# Patient Record
Sex: MALE | Race: WHITE | NOT HISPANIC OR LATINO | Employment: OTHER | URBAN - METROPOLITAN AREA
[De-identification: names, ages, dates, MRNs, and addresses within clinical notes are randomized per-mention and may not be internally consistent; named-entity substitution may affect disease eponyms.]

---

## 2021-04-07 ENCOUNTER — OFFICE VISIT CONVERTED (OUTPATIENT)
Dept: FAMILY MEDICINE CLINIC | Facility: CLINIC | Age: 86
End: 2021-04-07
Attending: NURSE PRACTITIONER

## 2021-04-07 ENCOUNTER — HOSPITAL ENCOUNTER (OUTPATIENT)
Dept: OTHER | Facility: HOSPITAL | Age: 86
Discharge: HOME | End: 2021-04-07
Attending: INTERNAL MEDICINE

## 2021-04-07 LAB
INR PPP: 1.67 (ref 2–3)
PROTHROMBIN TIME: 17.1 S (ref 9.4–12)

## 2021-05-05 ENCOUNTER — HOSPITAL ENCOUNTER (OUTPATIENT)
Dept: GENERAL RADIOLOGY | Facility: HOSPITAL | Age: 86
Discharge: HOME | End: 2021-05-05
Attending: NURSE PRACTITIONER

## 2021-05-05 ENCOUNTER — OFFICE VISIT CONVERTED (OUTPATIENT)
Dept: PULMONOLOGY | Facility: CLINIC | Age: 86
End: 2021-05-05
Attending: NURSE PRACTITIONER

## 2021-05-07 ENCOUNTER — CONVERSION ENCOUNTER (OUTPATIENT)
Dept: FAMILY MEDICINE CLINIC | Facility: CLINIC | Age: 86
End: 2021-05-07

## 2021-05-07 ENCOUNTER — OFFICE VISIT CONVERTED (OUTPATIENT)
Dept: FAMILY MEDICINE CLINIC | Facility: CLINIC | Age: 86
End: 2021-05-07
Attending: NURSE PRACTITIONER

## 2021-05-11 ENCOUNTER — HOSPITAL ENCOUNTER (OUTPATIENT)
Dept: CT IMAGING | Facility: HOSPITAL | Age: 86
Discharge: HOME | End: 2021-05-11
Attending: NURSE PRACTITIONER

## 2021-05-11 ENCOUNTER — HOSPITAL ENCOUNTER (OUTPATIENT)
Dept: FAMILY MEDICINE CLINIC | Facility: CLINIC | Age: 86
Discharge: HOME | End: 2021-05-11
Attending: NURSE PRACTITIONER

## 2021-05-11 NOTE — H&P
History and Physical      Patient Name: Arvind Coates   Patient ID: [id number]   Sex: Male   YOB: 1935    Primary Care Provider: Paco BAKER    Visit Date: April 7, 2021    Provider: OLIVIA Adamson   Location: [facility] [place]   Location Address: [address] [place] [address], [address]  [place], [place]  [id number]   Location Phone: [phone]          Chief Complaint  · NEW PT - EST CARE      History Of Present Illness  Arvind Coates is a 85 year old male who presents for evaluation and treatment of:      Presents today to establish care and for an inpatient follow-up.  He is an 85-year-old with past medical history of coronary artery disease status post CABG, hypertension, chronic A. fib on Coumadin status post permanent pacemaker.  He went to the emergency department on 3/21/2020 for a 1 week of shortness of breath, fever, and chills.  He had received his first Covid 19 vaccine on 3/10/2020 after 1 to 2 days he began feeling myalgias fatigue and a nonproductive cough and chills.  He tested positive for Covid on 3/13.  Both his girlfriend and daughter had tested positive for Covid.  He was seen in urgent care in [place].  Upon admission he was also to be found hypoxic, super therapeutic INR of 6, ANNEL, lactic acidosis.  He was admitted to the intensive care unit and was started on dexamethasone, vancomycin, and cefepime.  His nadirs tested positive for MRSA with a concern of preimposed bacterial pneumonia.  He was discharged on 4/4/2021.  He was discharged home on 5 more days of cefdinir.  Coumadin was held at discharge.  Recommended for his INR check in 3 days.  Cardiologist Yang Jackson has been monitoring his PT/INR of levels.  His previous PCP was Jaycob Najera at [facility] 5 to 6 years ago.  He was discharged on home health for PT OT, home oxygen, and nebulizer.    He was discharged home on DuoNeb p every 4 as needed, aspirin 81 mg daily, atorvastatin 40 mg at bedtime,  "cefdinir 300 mg twice daily, metoprolol succinate 25 mg daily, and guaifenesin 600 mg twice daily       Past Medical History  Broken Bones; CHF (congestive heart failure); Hemorrhoids; Hyperlipidemia; Hypertension; Shortness of Breath         Past Surgical History  Colonscopy         Medication List  aspirin 81 mg oral tablet,chewable; atorvastatin 40 mg oral tablet; cefdinir 300 mg oral capsule; Duo Neb 3 ml inhalation; ipratropium-albuterol 0.5 mg-3 mg(2.5 mg base)/3 mL inhalation solution for nebulization; metoprolol succinate 25 mg oral tablet extended release 24 hr; Quaifenesin 600 mg oral         Allergy List  NO KNOWN DRUG ALLERGIES         Family Medical History  Colon Cancer; Prostate cancer; Diabetes         Social History  Tobacco (Never)         Immunizations  No Pertinent Immunization History         Review of Systems  · Constitutional  o Admits  o : fatigue  o Denies  o : fever, chills, body aches  · Eyes  o Denies  o : blurred vision, changes in vision  · HENT  o Denies  o : headaches, vertigo, lightheadedness  · Cardiovascular  o Denies  o : chest pain, irregular heart beats, rapid heart rate, dyspnea on exertion  · Respiratory  o Admits  o : shortness of breath, cough  o Denies  o : wheezing  · Gastrointestinal  o Denies  o : nausea, vomiting, diarrhea, constipation, abdominal pain, blood in stools, melena  · Genitourinary  o Denies  o : frequency, dysuria, hematuria  · Integument  o Denies  o : rash, new skin lesions  · Musculoskeletal  o Denies  o : joint pain, joint swelling, muscle pain  · Endocrine  o Admits  o : central obesity  o Denies  o : polyuria, polydipsia      Vitals  Date Time BP Position Site L\R Cuff Size HR RR TEMP (F) WT  HT  BMI kg/m2 BSA m2 O2 Sat FR L/min FiO2        04/07/2021 01:34 /66 Sitting    89 - R  97.8 168lbs 8oz 5'  8\" 25.62 1.91 98 %            Physical Examination  · Constitutional  o Appearance  o : alert, in no acute distress  · Head and Face  o Head  o : "   § Inspection  § : atraumatic, normocephalic  o Face  o :   § Inspection  § : no facial lesions  o HEENT  o : Unremarkable  · Eyes  o Conjunctivae  o : conjunctivae normal  o Sclerae  o : sclerae white  o Pupils and Irises  o : pupils equal and round, pupils reactive to light bilaterally  o Eyelids/Ocular Adnexae  o : eyelid appearance normal  · Ears, Nose, Mouth and Throat  o Ears  o :   § External Ears  § : appearance within normal limits, no lesions present  o Nose  o :   § External Nose  § : appearance normal  o Oral Cavity  o :   § Oral Mucosa  § : oral mucosa normal  § Lips  § : lip appearance normal  § Teeth  § : poor dentition   § Gums  § : gums pink, non-swollen, no bleeding present  § Tongue  § : tongue appearance normal  § Palate  § : hard palate normal, soft palate appearance normal  o Throat  o : tonsils+1  · Neck  o Inspection/Palpation  o : normal appearance, no masses or tenderness, trachea midline  o Thyroid  o : gland size normal, nontender, no nodules or masses present on palpation  · Respiratory  o Respiratory Effort  o : breathing unlabored  o Auscultation of Lungs  o : normal breath sounds  · Cardiovascular  o Heart  o :   § Auscultation of Heart  § : regular rate, normal rhythm, no murmurs present  o Peripheral Vascular System  o :   § Extremities  § : no edema  · Gastrointestinal  o Abdominal Examination  o : abdomen nontender to palpation, normal bowel sounds, tone normal without rigidity or guarding, no masses present  o Liver and spleen  o : no hepatomegaly present  · Lymphatic  o Neck  o : no lymphadenopathy   o Supraclavicular Nodes  o : no supraclavicular nodes              Assessment  · Annual physical exam     V70.0/Z00.00  · Cough     786.2/R05  · Essential hypertension     401.9/I10  · Pneumonia     486/J18.9  · COVID-19     079.89/U07.1  · ANNEL (acute kidney injury)     584.9/N17.9  · CAD (coronary artery disease)     414.00/I25.10  · Afib     427.31/I48.91  · Monitoring for  anticoagulant use       Encounter for therapeutic drug level monitoring     V58.61/Z51.81  Long term (current) use of anticoagulants     V58.61/Z79.01  Check PT/INR. Send results to Yang Jackson to monitor. [facility] [employer] has consulted for PT OT. He is currently on oxygen 2 L nasal cannula. He is supposed to follow-up with pulmonary for his pneumonia in 1 to 2 weeks.       Plan  · Orders  o ACO-18: Negative screen for clinical depression using a standardized tool () - - 04/07/2021   0 POINTS  o ACO-39: Current medications updated and reviewed (, 1159F) - - 04/07/2021  o PULMONARY CONSULTATION (PULMO) - 079.89/U07.1, 486/J18.9 - 04/07/2021   IPF, d/c 4/4, 2 week follow up with [facility]  · Instructions  o Reviewed health maintenance flowsheet and updated information. Orders were placed and/or patient's response was documented.  o Patient advised to monitor blood pressure (B/P) at home and journal readings. Patient informed that a B/P reading at home of more than 130/80 is considered hypertension. For readings greater [id number]/90 or higher patient is advised to follow up in the office with readings for management. Patient advised to limit sodium intake.  o Patient was educated/instructed on their diagnosis, treatment and medications prior to discharge from the clinic today.  o Patient instructed to seek medical attention urgently for new or worsening symptoms.  o Call the office with any concerns or questions.  · Disposition  o Call or Return if symptoms worsen or persist.  o f/u 1 month            Electronically Signed by: OLIVIA Adamson -Author on April 14, 2021 06:10:25 PM

## 2021-05-13 ENCOUNTER — OFFICE VISIT CONVERTED (OUTPATIENT)
Dept: PULMONOLOGY | Facility: CLINIC | Age: 86
End: 2021-05-13
Attending: SPECIALIST

## 2021-05-13 LAB
BACTERIA SPEC AEROBE CULT: ABNORMAL
CEFEPIME SUSC ISLT: 2
CEFTAZIDIME SUSC ISLT: 2
CIPROFLOXACIN SUSC ISLT: <=0.25
GENTAMICIN SUSC ISLT: <=1
LEVOFLOXACIN SUSC ISLT: 0.5
PIP+TAZO SUSC ISLT: <=4
TOBRAMYCIN SUSC ISLT: <=1

## 2021-05-14 VITALS
BODY MASS INDEX: 25.54 KG/M2 | HEIGHT: 68 IN | DIASTOLIC BLOOD PRESSURE: 66 MMHG | OXYGEN SATURATION: 98 % | SYSTOLIC BLOOD PRESSURE: 130 MMHG | HEART RATE: 89 BPM | TEMPERATURE: 97.8 F | WEIGHT: 168.5 LBS

## 2021-05-28 VITALS
WEIGHT: 182.5 LBS | SYSTOLIC BLOOD PRESSURE: 103 MMHG | HEIGHT: 68 IN | TEMPERATURE: 98 F | OXYGEN SATURATION: 83 % | BODY MASS INDEX: 27.66 KG/M2 | RESPIRATION RATE: 14 BRPM | DIASTOLIC BLOOD PRESSURE: 62 MMHG | HEART RATE: 83 BPM

## 2021-05-28 NOTE — PROGRESS NOTES
Patient: MORIS MERCADO     Acct: [id number]     Report: #[id number]  UNIT #: [id number]     : 1935    Encounter Date:2021  PRIMARY CARE: ALEXUS MAGANA  ***Signed***  --------------------------------------------------------------------------------------------------------------------  Chief Complaint      Encounter Date      May 13, 2021            Primary Care Provider      SPEEDY TIAN            Patient Complaint      Patient is complaining of      Patient is here for a 4 month f/u            VITALS      Height 5 ft 8 in / 172.72 cm      Weight 182 lbs 8 oz / 82.872477 kg      BSA 1.97 m2      BMI 27.7 kg/m2      Temperature 98.0 F / 36.67 C - Tympanic      Pulse 83      Respirations 14      Blood Pressure 103/62 Sitting, Left Arm      Pulse Oximetry 83%, room air            HPI      This is an 87-year-old very pleasant gentleman had a history of COVID-19     sometime in 2021.  He had the COVID-19 vaccination few days before and     felt that patient started having the COVID-19 even before getting vaccinated.      Patient was in the hospital for couple of weeks and the treated and the patient     is discharged home on oxygen.  Compared to that patient is feeling much better.     He still continues to cough up phlegm and short of breath on moderate exertion.     Denies any fever chills sweating or any exposed anybody who is sick around.      Denied any hemoptysis or any recent travel history.      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      Other medical problems and management, social history and review of the system     are done and as documented.            ROS      Constitutional:  Denies: Fatigue, Fever, Weight gain, Weight loss, Chills,     Insomnia, Other      Respiratory/Breathing:  Denies: Shortness of air, Wheezing, Cough, Hemoptysis,     Pleuritic pain, Other       Endocrine:  Denies: Polydipsia, Polyuria, Heat/cold intolerance, Diabetes, Other      Eyes:  Denies: Blurred vision, Vision Changes, Other      Ears, nose, mouth, throat:  Denies: Congestion, Dysphagia, Hearing Changes, Nose    Bleeding, Nasal Discharge, Throat pain, Tinnitus, Other      Cardiovascular:  Denies: Chest Pain, Exertional dyspnea, Peripheral Edema,     Palpitations, Syncope, Wake up Gasping for air, Orthopnea, Tachycardia, Other      Gastrointestinal:  Denies: Abdominal pain/cramping, Bloody stools, Constipation,    Diarrhea, Melena, Nausea, Vomiting, Other      Genitourinary:  Denies: Dysuria, Urinary frequency, Incontinence, Hematuria,     Urgency, Other      Musculoskeletal:  Denies: Joint Pain, Joint Stiffness, Joint Swelling, Myalgias,    Other      Hematologic/lymphatic:  DENIES: Lymphadenopathy, Bruising, Bleeding tendencies,     Other      Neurologic:  Denies: Headache, Numbness, Weakness, Seizures, Other      Psychiatric:  Denies: Anxiety, Appropriate Effect, Depression, Other      Sleep:  No: Excessive daytime sleep, Morning Headache?, Snoring, Insomnia?, Stop    breathing at sleep?, Other      Integumentary:  Denies: Rash, Dry skin, Skin Warm to Touch, Other            FAMILY/SOCIAL/MEDICAL HX      Surgical History:  Yes: Appendectomy, Bowel Surgery (polyp removal), CABG, Oral     Surgery (wisdom teeth removal ); No: AAA Repair, Abdominal Surgery, Adenoids,     Angioplasty, Back Surgery, Bladder Surgery, Breast Surgery, Carotid Stenosis,     Cholecystectomy, Ear Surgery, Eye Surgery, Head Surgery, Hernia Surgery, Kidney     Surgery, Nose Surgery, Orthopedic Surgery, Prostatectomy, Rectal Surgery, Spinal    Surgery, Testicular Surgery, Throat Surgery, Tonsils, Valve Replacement,     Vascular Surgery, Other Surgeries      Heart - Family Hx:  Father, Brother      Diabetes - Family Hx:  Child      Cancer/Type - Family Hx:  Brother      Is Father Still Living?:  No      Is Mother Still Living?:   No       Family History:  Yes      Social History:  No Tobacco Use, No Alcohol Use, No Recreational Drug use      Smoking status:  Never smoker      Medical History:  Yes: Arthritis, Congestive Heart Failu, Hemorrhoids/Rectal     Prob (possible ulcer,polyps), High Blood Pressure, Miscellaneous Medical/oth     (1ST CV-19 SHOT 3/10/21; COVID POSITIVE 3/13/21); No: Alcoholism, Allergies,     Anemia, Asthma, Atrial Fibrillation, Blood Disease, Broken Bones, Cataracts,     Chemical Dependency, Chemotherapy/Cancer, Chronic Bronchitis/COPD, Emphysema,     Chronic Liver Disease, Colon Trouble, Colitis, Diverticulitis, Deafness or     Ringing Ears, Convulsions, Depression, Anxiety, Bipolar Disorder, PTSD,     Diabetes, Epilepsy, Seizures, Forgetfullness, Glaucoma, Gall Stones, Gout, Head     Injury, Heart Attack, Heart Murmur, GERD, Hepatitis, Hiatal Hernia, High     Cholesterol, HIV (Do not ask - volu, Jaundice, Kidney or Bladder Disease, Kidney    Stones, Migrane Headaches, Mitral Valve Prolapse, Night sweats, Phlebitis,     Psychiatric Care, Reflux Disease, Rheumatic Fever, Sexually Transmitted Dis,     Shortness Of Breath, Sinus Trouble, Skin Disease/Psoriais/Ecz, Stroke, Thyroid     Problem, Tuberculosis or Pos TB Te      Psychiatric History      none            PREVENTION      Hx Influenza Vaccination:  Yes      Date Influenza Vaccine Given:  Nov 1, 2020      Influenza Vaccine Declined:  No      2 or More Falls in Past Year?:  No      Fall Past Year with Injury?:  No      Hx Pneumococcal Vaccination:  No      Encouraged to follow-up with:  PCP regarding preventative exams.      Chart initiated by      Carrol Cowan MA            ALLERGIES/MEDICATIONS      Allergies:        Coded Allergies:             NO KNOWN DRUG ALLERGIES (Verified  Allergy, Unknown, 5/13/21)      Medications    Last Reconciled on 5/13/21 14:29 by BEN SOTO      levoFLOXacin (levoFLOXacin) 250 Mg Tablet      500 MG PO QDAY, #14 TAB          Prov: BEN SOTO         5/13/21       Cetirizine Hcl (zyrTEC) 5 Mg Tablet      5 MG PO HS for 30 Days, #30 TAB         Reported         5/13/21       Albuterol/Ipratropium (Duoneb) 3 Ml Ampul.neb      3 ML INH RTQ4H PRN for DYSPNEA for 30 Days, #1620 NEB 4 Refills         Prov: [facility]AS [facility]         5/13/21       MDI-Albuterol (Ventolin HFA) 18 Gm Hfa.aer.ad      2 PUFFS INH QID, #1 MDI 5 Refills         Prov: [facility]AS [facility]         5/5/21       Warfarin Sod (Coumadin*) 2 Mg Tablet      2 MG PO QDAY, #30 TAB 0 Refills         Reported         5/5/21       Metoprolol Succinate (Metoprolol Succinate) 25 Mg Tab.er.24h      25 MG PO QDAY for 30 Days, #30 TAB.ER         Prov: FEDERICO AMRTINEZ         4/4/21       Aspirin Chew (Aspirin Chew) 81 Mg Tab.chew      81 MG PO QDAY for 30 Days, #30 TAB.CHEW         Prov: FEDERICO MARTINEZ         4/4/21       Atorvastatin (Atorvastatin) 40 Mg Tablet      40 MG PO HS         Reported         3/20/21      Current Medications      Current Medications Reviewed 5/13/21            EXAM      Pleasant gentleman and very hard of hearing.  Patient's granddaughter and his     wife is with him who assisted.  Otherwise patient is alert and oriented and     comfortable at rest.  Not in any acute distress and on oxygen with the 2 L.      Vitals      Vitals:             Height 5 ft 8 in / 172.72 cm           Weight 182 lbs 8 oz / 82.035550 kg           BSA 1.97 m2           BMI 27.7 kg/m2           Temperature 98.0 F / 36.67 C - Tympanic           Pulse 83           Respirations 14           Blood Pressure 103/62 Sitting, Left Arm           Pulse Oximetry 83%, room air            Eyes      Conjunctiva:  Bilateral: Normal            Neck      JVP:  Normal            Respiratory      Respiratory effort:  normal      Auscultation:  Bilateral: Crackles/rales, Diminished at base            Cardiovascular      Rhythm:  regular      Heart sounds:  NORMAL: S1, S2             Gastrointestinal      Abdomen description:  Normal      Hepatosplenomegaly:  none      Mass:  None            Skin      General color:  Normal            Lymphatic      Bilateral: None      Bilateral: Non-tender            Psychiatric      Normal            Assessment      COVID-19 - U07.1            Pneumonia due to COVID-19 virus - U07.1, J12.82            Pseudomonas (aeruginosa) (mallei) (pseudomallei) as the cause of diseases     classified elsewhere - B96.5            Notes      Patient sputum came back as positive for Pseudomonas and sensitive to Levaquin     has the prescription was given.  Follow-up after 4 weeks and will get the chest     x-ray and the sputum for Gram stain and culture unless need to be seen sooner.      Gave him the Acapella use it 10 times every couple of hours while awake.      Continue the oxygen 2 L at this time.  And also the nebulizer therapy as     prescribed at the hospital.      New Medications      * CETIRIZINE HCL (zyrTEC) 5 MG TABLET: 5 MG PO HS 30 Days #30      * levoFLOXacin 500 MG TABLET: 500 MG PO QDAY 14 Days #14      New Diagnostics      * Chest 2 View, 4 Weeks         Dx: COVID-19 - U07.1      * Sputum Culture W/Gram Stain, 4 Weeks         Dx: Pseudomonas (aeruginosa) (mallei) (pseudomallei) as the cause of diseases       classified elsewhere - B96.5      ASSESSMENT:      1. COVID-19 pneumonia, initial swab positive on 03/13/21.       2. Multifocal bacterial pneumonia with suspicion for super imposed bacterial     pneumonia with procalcitonin 0.08.      3. Hypoxic respiratory failure due to #1 and #2.       4. Acute kidney injury, creatinine 2.04 scheduled to follow up with primary care    provider Friday.       5. Coronary artery disease status post coronary artery bypass graft, ejection     fraction 55% with diastolic dysfunction and mild aortic insufficiencies/mild     aortic stenosis.       6. Atrial fibrillation status post permanent pacemaker.       7. Posterior  MRSA swab.       8. Hypertension.       9. Never smoker.             PLAN:      1. Continue DuoNeb as needed.       2. A Ventolin inhaler was sent to the pharmacy to use as needed.       3. Continue oxygen to keep oxygen saturation at or above 89%.       4. Follow up with Dr. Jackson tomorrow as scheduled.       5. Follow up with primary care provider as scheduled.       6. The patient is advised to call the office, call 911 or go to the ER for any     new or worsening symptoms.       7. I will recheck a chest x-ray to ensure resolution of pneumonia.       8. The patient reports he is up to date with flu vaccine however he is not sure     when he had his last pneumonia vaccine. He will check with his primary care     provider and notify our office. The patient received his first COVID-19 shot. Th    e patient is advised to follow CDC recommendations such as social distancing,     wearing a mask and washing hand for at least 20 seconds.      9. Follow up with Dr. Brand in 4 weeks. At that time we can do a 6 minute walk    test to see if he still needs oxygen.            Electronically signed by BEN SOTO  05/13/2021 15:02       Disclaimer: Converted document may not contain table formatting or lab diagrams. Please see [email or web address] for the authenticated document.

## 2021-05-28 NOTE — PROGRESS NOTES
Patient: MORIS MERCADO     Acct: [id number]     Report: #[id number]  UNIT #: [id number]     : 1935    Encounter Date:2021  PRIMARY CARE: ALEXUS MAGANA  ***Signed***  --------------------------------------------------------------------------------------------------------------------  Chief Complaint      Encounter Date      May 5, 2021            Patient Complaint      Patient is complaining of      pt here for f/u. bacterial pneumonia            HPI      The patient is a 85 year old male recently hospitalized at [facility] from  to 21 for COVID-19 pneumonia. The patient has a past     medical history significant for coronary artery disease status post coronary     artery bypass graft, hypertension, chronic atrial fibrillation on Coumadin     status post permanent pacemaker. The patient presented to the ER on 21     with 1 week of shortness of air, fevers and chills. The patient reported he got     his first COVID-19 vaccine on 03/10/21. The patient states 1-2 days later he     started having body aches, myalgias, fatigue, nonproductive cough and chills.     The patient was tested for COVID-19 on 21 and it returned positive. The     patient's girlfriend who lives with him also tested positive as well as the     patient's daughter tested positive. They are unclear about their initial     exposure but the patient states he did see his daughter 2 weeks prior. The     patient was seen in the urgent care clinic in [place] and they referred     him for Bamlanivimab infusion which he received a few days ago. Unfortunately     the patient continued to have worsening shortness of air, cough, fevers,     myalgias, fatigue weakness and chills prompting him to seek medication attention    in the ER. Initially the patient was found to be acutely hypoxic eventually     requiring Airvo for adequate saturation. The patient was also found to have      supratherapeutic INR of 6, acute kidney injury, lactic acidosis and the patient     was admitted to ICU for further care. Pulmonary and critical care were consulted    to assist in the patient's care. The patient was started on dexamethasone,      vancomycin and Cefepime. INR trended down and remained therapeutic throughout     the remainder of her hospital stay. The patient was treated with Actemra X 2,     dexamethasone and frequent bronchodilators. The patient was out of the window     for convalescent plasma or Remdesivir. There was concern for bacterial pneumonia    so the patient was initially on broad spectrum antibiotics. The patient had     recurrent fevers and repeat work up showed MRSA of the nares positive and     concern for super imposed bacterial pneumonia on chest x-ray. The patient     completed Zyvox during his hospital stay and continued 5 more days of cefdinir     at discharge. The patient was eventually weaned down to 2 liters of oxygen per     minute via nasal cannula over the course of the hospital stay. PT and OT were     consulted and recommended home health. A DME  form was signed for home health,     home oxygen, nebulizer and a bedside commode at discharge. The patient requested    a hospital bed but insurance did not approve this. The patient had some     intermittent bouts of extreme anxiety during hospitalization and was extremely     anxious about going home despite recurrent reassurances. The patient was     discharged home with home health and family. The patient states since discharge     from the hospital he is feeling much better. Of note, the patient's daughter is     present with the patient and providing history today. The patient states his     cough has improved significantly and he is able to smell however his taste is     still altered. The patient states he is still on oxygen 2 liters per minute via     nasal cannula. The patient states he feels his shortness of breath  has improved     and he is going to try to wean himself down and off oxygen. The patient denies     any fever or chills, night sweats, hemoptysis,  purulent sputum production,     swollen glands in head and neck, unintentional weight loss, chest pain or chest     tightness, abdominal pain, nausea or vomiting or diarrhea. The patient denies      any headaches, myalgias. The patient states he has intermittent leg swelling and    is under the care of Dr. Jackson for atrial fibrillation and congestive heart     failure and is scheduled to follow up with him tomorrow. The patient denies any     orthopnea or paroxysmal nocturnal dyspnea. The patient reports he is a never     smoker. The patient states he is also scheduled to follow up with his primary     care provider this Friday. The patient states he has been using DuoNeb as needed    and he would like our office to resend this to  his pharmacy as when the     medication was sent before, it was not approved with current diagnosis code. The    patient states he would also like to have an albuterol inhaler to use as needed.    The patient states Dr. Jackson is managing his INR and Coumadin and is scheduled     to follow up with him tomorrow. The patient denies any hematuria, hematochezia,     hematemasis, hemoptysis or epistaxis.  The patient states he is able to perform     his activities of daily living. The patient denies any history of any     respiratory ailments prior to COVID-19. The patient states he has never been     diagnosed with asthma, chronic obstructive pulmonary disease or emphysema.             I reviewed the Review of Systems, medical, surgical and family history and agree    with those as entered.            ROS      Constitutional:  Denies: Fatigue, Fever, Weight gain, Weight loss, Chills,     Insomnia, Other      Respiratory/Breathing:  Complains of: Shortness of air, Wheezing, Cough; Denies:    Hemoptysis, Pleuritic pain, Other      Endocrine:   Denies: Polydipsia, Polyuria, Heat/cold intolerance, Diabetes, Other      Eyes:  Denies: Blurred vision, Vision Changes, Other      Ears, nose, mouth, throat:  Denies: Mouth lesions, Thrush, Throat pain,     Hoarseness, Allergies/Hay Fever, Post Nasal Drip, Headaches, Recent Head Injury,    Nose Bleeding, Neck Stiffness, Thyroid Mass, Hearing Loss, Ear Fullness, Dry     Mouth, Nasal or Sinus Pain, Dry Lips, Nasal discharge, Nasal congestion, Other      Cardiovascular:  Denies: Palpitations, Syncope, Claudication, Chest Pain, Wake     up Gasping for air, Leg Swelling, Irregular Heart Rate, Cyanosis, Dyspnea on     Exertion, Other      Gastrointestinal:  Denies: Nausea, Constipation, Diarrhea, Abdominal pain,     Vomiting, Difficulty Swallowing, Reflux/Heartburn, Dysphagia, Jaundice,     Bloating, Melena, Bloody stools, Other      Genitourinary:  Denies: Urinary frequency, Incontinence, Hematuria, Urgency,     Nocturia, Dysuria, Testicular problems, Other      Musculoskeletal:  Denies: Joint Pain, Joint Stiffness, Joint Swelling, Myalgias,    Other      Hematologic/lymphatic:  DENIES: Lymphadenopathy, Bruising, Bleeding tendencies,     Other      Neurological:  Denies: Headache, Numbness, Weakness, Seizures, Other      Psychiatric:  Denies: Anxiety, Appropriate Effect, Depression, Other      Sleep:  No: Excessive daytime sleep, Morning Headache?, Snoring, Insomnia?, Stop    breathing at sleep?, Other      Integumentary:  Denies: Rash, Dry skin, Skin Warm to Touch, Other      Immunologic/Allergic:  Denies: Latex allergy, Seasonal allergies, Asthma,     Urticaria, Eczema, Other      Immunization status:  No: Up to date            FAMILY/SOCIAL/MEDICAL HX      Surgical History:  Yes: Appendectomy, Bowel Surgery (polyp removal), CABG, Oral     Surgery (wisdom teeth removal ); No: Abdominal Surgery, Bladder Surgery,     Cholecystectomy, Head Surgery, Orthopedic Surgery, Vascular Surgery      Heart - Family Hx:  Father,  Brother      Diabetes - Family Hx:  Child      Cancer/Type - Family Hx:  Brother      Is Father Still Living?:  No      Is Mother Still Living?:  No      Social History:  No Tobacco Use, No Alcohol Use, No Recreational Drug use      Smoking status:  Never smoker      Medical History:  Yes: Arthritis, Congestive Heart Failu, Hemorrhoids/Rectal     Prob (possible ulcer,polyps), High Blood Pressure, Miscellaneous Medical/oth     (1ST CV-19 SHOT 3/10/21; COVID POSITIVE 3/13/21); No: Asthma, Blood Disease,     Chemotherapy/Cancer, Chronic Bronchitis/COPD, Deafness or Ringing Ears, Diabetes    , Seizures, Heart Attack, Shortness Of Breath      Psychiatric History      none            PREVENTION      Hx Influenza Vaccination:  Yes      Date Influenza Vaccine Given:  Nov 1, 2020      Influenza Vaccine Declined:  No      2 or More Falls in Past Year?:  No      Fall Past Year with Injury?:  No      Hx Pneumococcal Vaccination:  No      Encouraged to follow-up with:  PCP regarding preventative exams.      Chart initiated by      Deb Elliott [facility]            ALLERGIES/MEDICATIONS      Allergies:        Coded Allergies:             NO KNOWN DRUG ALLERGIES (Verified  Allergy, Unknown, 5/5/21)      Medications    Last Reconciled on 5/5/21 15:11 by BETINA BERMAN-Albuterol (Ventolin HFA) 18 Gm Hfa.aer.ad      2 PUFFS INH QID, #1 MDI 5 Refills         [facility]         5/5/21       Albuterol/Ipratropium (Duoneb) 3 Ml Ampul.neb      3 ML INH RTQ4H PRN for DYSPNEA for 30 Days, #180 NEB 4 Refills         Prov[facility]         5/5/21       Warfarin Sod (Coumadin*) 2 Mg Tablet      2 MG PO QDAY, #30 TAB 0 Refills         Reported         5/5/21       Metoprolol Succinate (Metoprolol Succinate) 25 Mg Tab.er.24h      25 MG PO QDAY for 30 Days, #30 TAB.ER         Prov: FEDERICO MARTINEZ         4/4/21       Aspirin Chew (Aspirin Chew) 81 Mg Tab.chew      81 MG PO QDAY for 30 Days, #30  TAB.CHEW         Prov: FEDERICO MARTINEZ         21       Atorvastatin (Atorvastatin) 40 Mg Tablet      40 MG PO HS         Reported         3/20/21      Current Medications      Current Medications Reviewed 21            EXAM      Vital Signs Reviewed      Gen: WDWN, Alert, NAD.        HEENT:  PERRL, EOMI.  OP, nares clear, no sinus tenderness.      Neck:  Supple, no JVD, no thyromegaly.      Lymph: No axillary, cervical, supraclavicular lymphadenopathy noted bilaterally.      Chest:  Lungs clear to auscultation bilaterally, no wheezes, rales or rhonchi,     normal work of breathing noted, patient able to speak full sentences without     difficulty.  The patient has oxygen in place via nasal cannula at 2 liters per     minute.       CV:  RRR, no MGR, pulses 2+, equal.      Abd:  Soft, NT, ND, + BS, no HSM.      EXT:  No clubbing, no cyanosis, no edema, no joint tenderness.       Neuro:  A  Skin: No rashes or lesions.            REVIEW      Results Reviewed      PCCS Results Reviewed?:  Yes Prev Lab Results, Yes Prev Radiology Results, Yes     Previous [facility] Records      Lab Results      I personally reviewed the patient's discharge summary from recent hospital stay.      Radiographic Results               [facility]                PACS RADIOLOGY REPORT            Patient: MORIS MERCADO   [id number]   Report: #[id number]            UNIT #: [id number]    DOS: 21 1225      INSURANCE:MEDICARE PART A   LOCATION:[address] [place] [address]   : 1935            PROVIDERS      ADMITTING:  Parish Pineda   ATTENDING: Parish Pineda      FAMILY:  NONE,MD   ORDERING:  Paresh Brand         OTHER:    DICTATING:  Uriel Edmond MD            REQ #:[id number]   EXAM:CXRPORTABL - Portable Chest xray 1 view      REASON FOR EXAM:  COVID      REASON FOR VISIT:  COVID,HYPOXIC RESP FAILURE            *******Signed******         PROCEDURE:   PORTABLE  CHEST X-RAY             COMPARISON:   [facility], CR, CHEST AP/PA 1 VIEW, 3/20/2021,     11:47.             INDICATIONS:   COVID             FINDINGS:         Moderate consolidation is noted in the right lung base.  Subtle infiltrates are     noted in the mid       and lower left.  The cardiac and mediastinal silhouettes appear normal.  No     effusion is seen.             CONCLUSION:         1. Bilateral infiltrates, somewhat improved in the interval.              Uriel Edmond M.D.             Electronically Signed and Approved By: Uriel Edmond M.D. on 3/27/2021 at 13:24                                  Until signed, this is an unconfirmed preliminary report that may contain      errors and is subject to change.                                              WURRO:      D:21 1324                     [facility]                PACS RADIOLOGY REPORT            Patient: MORIS MERCADO   Acct: #[id number]   Report: #[id number]            UNIT #: [id number]    DOS: 21 1135      INSURANCE:   ORDER #:[id number]      LOCATION:ER     : 1935            PROVIDERS      ADMITTING:     ATTENDING:       FAMILY:     ORDERING:  LIZBETH CHRISTIAN         OTHER:    DICTATING:  Traci Diaz MD            REQ #:[id number]   EXAM:CXR1 - CHEST 1 View AP PA      REASON FOR EXAM:  Cough      REASON FOR VISIT:  SOA/COVID+ --GREEN            *******Signed******         PROCEDURE:   CHEST AP/PA SINGLE VIEW             COMPARISON:   None.             INDICATIONS:   COVID + 3/13. SHORTNESS OF BREATH             FINDINGS:         There are multifocal airspace opacities throughout the right lower lung and in     the left mid to       lower lung.  No pneumothorax or large pleural effusion is seen.  Cardiac     silhouette appears mildly       enlarged.  Sternal wires are noted.  Retrocardiac gas is probably in a hiatal     hernia.             CONCLUSION:    Multifocal airspace opacities in both lungs, consistent with     pneumonia.              SOFI LYNN MD             Electronically Signed and Approved By: SOFI LYNN MD on 3/20/2021 at 12:17                                  Until signed, this is an unconfirmed preliminary report that may contain      errors and is subject to change.                                              BARLA:      D:03/20/21 1217            Assessment      Bronchitis - J40            Pneumonia - J18.9            Notes      New Medications      * Warfarin Sod (Coumadin*) 2 MG TABLET: 2 MG PO QDAY #30      * MDI-Albuterol (Ventolin HFA) 18 GM HFA.AER.AD: 2 PUFFS INH QID #1      Renewed Medications      * Albuterol/Ipratropium (Duoneb) 3 ML AMPUL.NEB: 3 ML INH RTQ4H PRN DYSPNEA 30       Days #180         Instructions: J40         Dx: Bronchitis - J40      Discontinued Medications      * CEFDINIR 300 MG CAP: 300 MG PO BID 5 Days #10      New Diagnostics      * Chest 2 View, 1 DAY         Dx: Pneumonia - J18.9      ASSESSMENT:      1. COVID-19 pneumonia, initial swab positive on 03/13/21.       2. Multifocal bacterial pneumonia with suspicion for super imposed bacterial     pneumonia with procalcitonin 0.08.      3. Hypoxic respiratory failure due to #1 and #2.       4. Acute kidney injury, creatinine 2.04 scheduled to follow up with primary care    provider Friday.       5. Coronary artery disease status post coronary artery bypass graft, ejection     fraction 55% with diastolic dysfunction and mild aortic insufficiencies/mild     aortic stenosis.       6. Atrial fibrillation status post permanent pacemaker.       7. Posterior MRSA swab.       8. Hypertension.       9. Never smoker.             PLAN:      1. Continue DuoNeb as needed.       2. A Ventolin inhaler was sent to the pharmacy to use as needed.       3. Continue oxygen to keep oxygen saturation at or above 89%.       4. Follow up with Dr. Jackson tomorrow as scheduled.        5. Follow up with primary care provider as scheduled.       6. The patient is advised to call the office, call 911 or go to the ER for any     new or worsening symptoms.       7. I will recheck a chest x-ray to ensure resolution of pneumonia.       8. The patient reports he is up to date with flu vaccine however he is not sure     when he had his last pneumonia vaccine. He will check with his primary care     provider and notify our office. The patient received his first COVID-19 shot.     The patient is advised to follow CDC recommendations such as social distancing,     wearing a mask and washing hand for at least 20 seconds.      9. Follow up with Dr. Brand in 4 weeks. At that time we can do a 6 minute walk    test to see if he still needs oxygen.            Patient Education      Patient Education Provided:  Acute Bronchitis      Time Spent:  > 50% /Coord Care            Patient Education:        Bronchitis (Adult)            Electronically signed by NILO LOMAS [facility]  05/10/2021 13:16       Disclaimer: Converted document may not contain table formatting or lab diagrams. Please see [employer] for the authenticated document.

## 2021-06-06 NOTE — PROGRESS NOTES
Progress Note      Patient Name: Arvind Coates   Patient ID: [id number]   Sex: Male   YOB: 1935    Primary Care Provider: Paco BAKER    Visit Date: May 7, 2021    Provider: OLIVIA Adamson   Location: [facility]   Location Address: [address] [place] [address], [address] [place] [address]  [id number]   Location Phone: [phone]          Chief Complaint  · 1 month f/u      History Of Present Illness  Arvind Coates is a 85 year old /White male who presents for evaluation and treatment of:      Presents today for 1 month follow-up on pneumonia, COVID-19, CHF, COPD.  He has home health that is coming out 1-2 times a week.  Cardiologist is monitoring his PT/INR for Coumadin since his chronic A. fib.  He has some shortness of breath with exertion.  He states he saw pulmonary yesterday which he had a chest x-ray that showed effusions cardiomegaly.  He is scheduled for CT scan next week.  Denies fever and chills.       Past Medical History  Broken Bones; CHF (congestive heart failure); Hemorrhoids; Hyperlipidemia; Hypertension; Shortness of Breath         Past Surgical History  Colonscopy         Medication List  aspirin 81 mg oral tablet,chewable; atorvastatin 40 mg oral tablet; Duo Neb 3 ml inhalation; ipratropium-albuterol 0.5 mg-3 mg(2.5 mg base)/3 mL inhalation solution for nebulization; lisinopril 10 mg oral tablet; metoprolol succinate 50 mg oral tablet extended release 24 hr; warfarin 2 mg oral tablet         Allergy List  NO KNOWN DRUG ALLERGIES         Family Medical History  Colon Cancer; Prostate cancer; Diabetes         Social History  Tobacco (Never)         Review of Systems  · Constitutional  o Denies  o : fever, fatigue, weight loss, weight gain  · HENT  o Admits  o : nasal congestion  o Denies  o : headaches, vertigo, lightheadedness, recent head injury  · Cardiovascular  o Admits  o : dyspnea on exertion  o Denies  o : chest pain, irregular heart beats,  "rapid heart rate, lower extremity edema  · Respiratory  o Admits  o : shortness of breath  o Denies  o : wheezing, cough  · Gastrointestinal  o Denies  o : nausea, vomiting, diarrhea, constipation, abdominal pain  · Genitourinary  o Denies  o : dysuria, urinary retention      Vitals  Date Time BP Position Site L\R Cuff Size HR RR TEMP (F) WT  HT  BMI kg/m2 BSA m2 O2 Sat FR L/min FiO2        05/07/2021 02:13 /78 Sitting    86 - R  97.3 181lbs 0oz 5'  8\" 27.52 1.98 97 %            Physical Examination  · Constitutional  o Appearance  o : alert, in no acute distress  · Head and Face  o Head  o :   § Inspection  § : atraumatic, normocephalic  o Face  o :   § Inspection  § : no facial lesions  o HEENT  o : Unremarkable  · Eyes  o Conjunctivae  o : conjunctivae normal  o Sclerae  o : sclerae white  o Pupils and Irises  o : pupils equal and round, pupils reactive to light bilaterally  o Eyelids/Ocular Adnexae  o : eyelid appearance normal  · Ears, Nose, Mouth and Throat  o Ears  o :   § External Ears  § : appearance within normal limits, no lesions present  o Nose  o :   § External Nose  § : appearance normal  o Oral Cavity  o :   § Oral Mucosa  § : oral mucosa normal  § Lips  § : lip appearance normal  § Teeth  § : normal dentition for age  § Gums  § : gums pink, non-swollen, no bleeding present  § Tongue  § : tongue appearance normal  § Palate  § : hard palate normal, soft palate appearance normal  o Throat  o : Tonsils +1, no erythema throat  · Neck  o Inspection/Palpation  o : normal appearance, no masses or tenderness, trachea midline  o Thyroid  o : gland size normal, nontender, no nodules or masses present on palpation  · Respiratory  o Respiratory Effort  o : breathing unlabored  o Auscultation of Lungs  o : normal breath sounds  · Cardiovascular  o Heart  o :   § Auscultation of Heart  § : regular rate, normal rhythm, no murmurs present  o Peripheral Vascular System  o :   § Extremities  § : no " edema  · Gastrointestinal  o Abdominal Examination  o : abdomen nontender to palpation, normal bowel sounds, tone normal without rigidity or guarding, no masses present  o Liver and spleen  o : no hepatomegaly present  · Lymphatic  o Neck  o : no lymphadenopathy           Assessment  · CHF (congestive heart failure)     428.0/I50.9  · COPD (chronic obstructive pulmonary disease)     496/J44.9  · Essential hypertension     401.9/I10  · Hyperlipidemia     272.4/E78.5  · Pneumonia     486/J18.9  · Afib     427.31/I48.91  · CAD (coronary artery disease)     414.00/I25.10      Plan  · Orders  o ACO-39: Current medications updated and reviewed (, 1159F) - - 05/07/2021  o Sputum culture (78129) - 486/J18.9 - 05/07/2021  · Medications  o cetirizine 10 mg oral tablet   SIG: take 1 tablet (10 mg) by oral route once daily   DISP: (30) Tablet with 2 refills  Prescribed on 05/07/2021     o fluticasone propionate 50 mcg/actuation nasal spray,suspension   SIG: spray 2 sprays (100 mcg) in each nostril by intranasal route once daily as needed for 30 days   DISP: (1) Spray with 2 refills  Prescribed on 05/07/2021     · Instructions  o Patient advised to monitor blood pressure (B/P) at home and journal readings. Patient informed that a B/P reading at home of more than 130/80 is considered hypertension. For readings greater cbss792/90 or higher patient is advised to follow up in the office with readings for management. Patient advised to limit sodium intake.  o Advised that cheeses and other sources of dairy fats, animal fats, fast food, and the extras (candy, pastries, pies, doughnuts and cookies) all contain LDL raising nutrients. Advised to increase fruits, vegetables, whole grains, and to monitor portion sizes.   o Patient was educated/instructed on their diagnosis, treatment and medications prior to discharge from the clinic today.  o Order sputum culture for possible pneumonia due to effusion and chest x-ray. He has a CT  scan next week and follow-up with pulmonary. Will send Home for sputum culture since he is unable to cough a sample today in office. He will deliver To home health or to the hospital when she goes for CT scan  · Disposition  o Call or Return if symptoms worsen or persist.  o f/u 1 month            Electronically Signed by: OLIVIA Adamson -Author on May 11, 2021 09:16:00 AM

## 2021-06-07 ENCOUNTER — TRANSCRIBE ORDERS (OUTPATIENT)
Dept: PULMONOLOGY | Facility: CLINIC | Age: 86
End: 2021-06-07

## 2021-06-07 ENCOUNTER — HOSPITAL ENCOUNTER (OUTPATIENT)
Dept: GENERAL RADIOLOGY | Facility: HOSPITAL | Age: 86
Discharge: HOME | End: 2021-06-07
Admitting: SPECIALIST

## 2021-06-07 ENCOUNTER — LAB REQUISITION (OUTPATIENT)
Dept: LAB | Facility: HOSPITAL | Age: 86
End: 2021-06-07

## 2021-06-07 DIAGNOSIS — U07.1 CLINICAL DIAGNOSIS OF COVID-19: Primary | ICD-10-CM

## 2021-06-07 DIAGNOSIS — U07.1 CLINICAL DIAGNOSIS OF COVID-19: ICD-10-CM

## 2021-06-07 DIAGNOSIS — B96.5 PSEUDOMONAS (AERUGINOSA) (MALLEI) (PSEUDOMALLEI) AS THE CAUSE OF DISEASES CLASSIFIED ELSEWHERE: ICD-10-CM

## 2021-06-07 LAB
BACTERIA SPEC RESP CULT: NORMAL
GRAM STN SPEC: NORMAL

## 2021-06-07 PROCEDURE — 87205 SMEAR GRAM STAIN: CPT | Performed by: SPECIALIST

## 2021-06-07 PROCEDURE — 71046 X-RAY EXAM CHEST 2 VIEWS: CPT

## 2021-06-09 ENCOUNTER — TELEPHONE (OUTPATIENT)
Dept: PULMONOLOGY | Facility: CLINIC | Age: 86
End: 2021-06-09

## 2021-06-22 ENCOUNTER — OFFICE VISIT (OUTPATIENT)
Dept: PULMONOLOGY | Facility: CLINIC | Age: 86
End: 2021-06-22

## 2021-06-22 VITALS
HEART RATE: 67 BPM | WEIGHT: 175 LBS | HEIGHT: 68 IN | OXYGEN SATURATION: 96 % | SYSTOLIC BLOOD PRESSURE: 138 MMHG | TEMPERATURE: 97.2 F | DIASTOLIC BLOOD PRESSURE: 74 MMHG | RESPIRATION RATE: 16 BRPM | BODY MASS INDEX: 26.52 KG/M2

## 2021-06-22 DIAGNOSIS — J18.9 PNEUMONIA OF BOTH LUNGS DUE TO INFECTIOUS ORGANISM, UNSPECIFIED PART OF LUNG: Primary | ICD-10-CM

## 2021-06-22 PROCEDURE — 99213 OFFICE O/P EST LOW 20 MIN: CPT | Performed by: SPECIALIST

## 2021-06-22 RX ORDER — METOPROLOL SUCCINATE 25 MG/1
TABLET, EXTENDED RELEASE ORAL
COMMUNITY
Start: 2021-04-04

## 2021-06-22 RX ORDER — ALBUTEROL SULFATE 90 UG/1
2 AEROSOL, METERED RESPIRATORY (INHALATION) EVERY 6 HOURS PRN
COMMUNITY
Start: 2021-05-05

## 2021-06-22 RX ORDER — ATORVASTATIN CALCIUM 40 MG/1
TABLET, FILM COATED ORAL
COMMUNITY
Start: 2021-03-02

## 2021-06-22 RX ORDER — BENZONATATE 100 MG/1
100 CAPSULE ORAL 3 TIMES DAILY
COMMUNITY
Start: 2021-03-17 | End: 2022-05-19

## 2021-06-22 RX ORDER — CETIRIZINE HYDROCHLORIDE 10 MG/1
10 TABLET ORAL DAILY
COMMUNITY
Start: 2021-05-07 | End: 2022-05-19

## 2021-06-22 RX ORDER — FLUTICASONE PROPIONATE 50 MCG
SPRAY, SUSPENSION (ML) NASAL
COMMUNITY
Start: 2021-05-07

## 2021-06-22 RX ORDER — WARFARIN SODIUM 2 MG/1
2 TABLET ORAL NIGHTLY
COMMUNITY
Start: 2021-03-09

## 2021-06-22 RX ORDER — LISINOPRIL 10 MG/1
TABLET ORAL
COMMUNITY
Start: 2021-02-05

## 2021-06-22 RX ORDER — ASPIRIN 81 MG/1
TABLET, CHEWABLE ORAL
COMMUNITY
Start: 2021-04-04

## 2021-06-22 NOTE — PROGRESS NOTES
OFFICE NOTE  Chief Complaint   Patient presents with   • Results     CT and Chest XRay   • Shortness of Breath   • Fatigue   • Cough     Producti8ve-clear        Primary Care Provider  Paco Daniel APRN     Referring Provider  No ref. provider found    Patient Complaint    ICD-10-CM ICD-9-CM   1. Pneumonia of both lungs due to infectious organism, unspecified part of lung  J18.9 483.8            Subjective          History of Presenting Illness  Arvind Coates is a 85 y.o. male has history of COVID-19 pneumonia and also had a Pseudomonas and patient is here for the follow-up.  He states that he is feeling better but still short of breath not completely gone.  Feeling tired.  Initially the pharmacy do not know what his Acapella is and subsequently the home health care got him.  He is acute as needed but not on a regular basis.  Patient denies of having any fever, chest pain, productive cough, hemoptysis or exposure to anybody who is sick around him.  Patient living in a fracture at this time are from the city and feel better.  Patient continue to use the oxygen with the 2 L.  I have personally reviewed the review of systems, past family, social, medical and surgical histories; and agree with their findings.    HISTORY    History reviewed. No pertinent family history.     Social History     Socioeconomic History   • Marital status: Single     Spouse name: Not on file   • Number of children: Not on file   • Years of education: Not on file   • Highest education level: Not on file   Tobacco Use   • Smoking status: Never Smoker   • Smokeless tobacco: Never Used   Vaping Use   • Vaping Use: Never used   Substance and Sexual Activity   • Alcohol use: Never   • Drug use: Defer   • Sexual activity: Defer        Past Medical History:   Diagnosis Date   • Pneumonia         Immunization History   Administered Date(s) Administered   • COVID-19 (MODERNA) 03/10/2021       No Known Allergies       Current Outpatient Medications:    •  albuterol sulfate  (90 Base) MCG/ACT inhaler, , Disp: , Rfl:   •  aspirin 81 MG chewable tablet, aspirin 81 mg oral tablet,chewable chew 1 tablet (81 mg) by oral route once daily   Active, Disp: , Rfl:   •  atorvastatin (LIPITOR) 40 MG tablet, atorvastatin 40 mg oral tablet take 1 tablet (40 mg) by oral route once daily at bedtime   Active, Disp: , Rfl:   •  benzonatate (TESSALON) 100 MG capsule, Take 100 mg by mouth 3 (Three) Times a Day., Disp: , Rfl:   •  cetirizine (zyrTEC) 10 MG tablet, Take 10 mg by mouth Daily., Disp: , Rfl:   •  fluticasone (FLONASE) 50 MCG/ACT nasal spray, SPRAY 2 SPRAYS IN EACH NOSTRIL BY INTRANASAL ROUTE ONCE DAILY AS NEEDED, Disp: , Rfl:   •  Ipratropium-Albuterol (ALBUTEROL-IPRATROPIUM IN), Duo Neb 3 ml inhalation Q 4 hours as needed   Active, Disp: , Rfl:   •  lisinopril (PRINIVIL,ZESTRIL) 10 MG tablet, lisinopril 10 mg oral tablet take 1 tablet (10 mg) by oral route once daily   Active, Disp: , Rfl:   •  metoprolol succinate XL (TOPROL-XL) 25 MG 24 hr tablet, metoprolol succinate 25 mg oral tablet extended release 24 hr take 1 tablet (25 mg) by oral route once daily for 30 days   Suspended, Disp: , Rfl:   •  warfarin (COUMADIN) 2 MG tablet, warfarin 2 mg oral tablet take 1 tablet (2 mg) by oral route once daily   Active, Disp: , Rfl:      Smoking status: Never smoker      Objective     Vital Signs:   Vitals:    06/22/21 1056   BP: 138/74   Pulse: 67   Resp: 16   Temp: 97.2 °F (36.2 °C)   SpO2: 96%        Physical Exam:  HEENT: No acute problems noted.  Neck: Supple, no JVD, no bruits heard.  Lungs: Minimally decreased breath sounds at the bases posteriorly otherwise no rhonchi no rales no wheezing.  Cardiovascular system regular rate and rhythm.  No murmurs appreciated.  Abdomen: Soft.  Nontender.  Bowel sounds present.  Extremities: No clubbing, no cyanosis, no edema.  Central nervous system grossly intact.       Result Review :   I have personally reviewed the the chest  x-ray and the report showed persistent multifocal predominantly peripheral located alveolar and interstitial opacities with increase in density at the right base and some chronically and incompletely resolved opacities.         Impression:  Patient with COVID-19 pneumonia and also infected by the Pseudomonas and chest x-ray showing delayed resolution but the patient is slowly getting better.  Relative hypoxemia    Plan:  Continue the present medical therapy.  Encourage the patient to take the nebulizer 4 times daily.  Steam inhalation and to use the Acapella 10 times every 2 hours while awake and at the bedtime  Any problem in between call us especially if he is having any productive sputum, hemoptysis, temperature and other related  Follow-up with the primary physician as before.  Follow-up with the pulmonary clinic in 3 months with a chest x-ray PA and lateral.    Follow Up   Return in about 3 months (around 9/22/2021).  Patient was given instructions and counseling regarding his condition or for health maintenance advice. Please see specific information pulled into the AVS if appropriate.

## 2021-07-07 ENCOUNTER — OFFICE VISIT (OUTPATIENT)
Dept: FAMILY MEDICINE CLINIC | Facility: CLINIC | Age: 86
End: 2021-07-07

## 2021-07-07 VITALS
HEART RATE: 70 BPM | SYSTOLIC BLOOD PRESSURE: 146 MMHG | TEMPERATURE: 98.6 F | HEIGHT: 68 IN | OXYGEN SATURATION: 97 % | DIASTOLIC BLOOD PRESSURE: 84 MMHG | BODY MASS INDEX: 26.31 KG/M2 | WEIGHT: 173.6 LBS

## 2021-07-07 DIAGNOSIS — R73.9 ELEVATED BLOOD SUGAR: ICD-10-CM

## 2021-07-07 DIAGNOSIS — Z12.5 SCREENING FOR PROSTATE CANCER: ICD-10-CM

## 2021-07-07 DIAGNOSIS — I48.91 ATRIAL FIBRILLATION, UNSPECIFIED TYPE (HCC): ICD-10-CM

## 2021-07-07 DIAGNOSIS — I50.9 CHRONIC CONGESTIVE HEART FAILURE, UNSPECIFIED HEART FAILURE TYPE (HCC): Primary | ICD-10-CM

## 2021-07-07 DIAGNOSIS — E78.2 MIXED HYPERLIPIDEMIA: ICD-10-CM

## 2021-07-07 DIAGNOSIS — N18.4 CKD (CHRONIC KIDNEY DISEASE) STAGE 4, GFR 15-29 ML/MIN (HCC): ICD-10-CM

## 2021-07-07 DIAGNOSIS — I10 ESSENTIAL HYPERTENSION: ICD-10-CM

## 2021-07-07 PROBLEM — Z95.2 HISTORY OF AORTIC VALVE REPLACEMENT: Status: ACTIVE | Noted: 2021-05-13

## 2021-07-07 PROBLEM — Z99.81 DEPENDENCE ON SUPPLEMENTAL OXYGEN: Status: ACTIVE | Noted: 2021-05-13

## 2021-07-07 PROBLEM — K64.9 HEMORRHOIDS: Status: ACTIVE | Noted: 2021-07-07

## 2021-07-07 LAB
BASOPHILS # BLD AUTO: 0.06 10*3/MM3 (ref 0–0.2)
BASOPHILS NFR BLD AUTO: 0.9 % (ref 0–1.5)
CHOLEST SERPL-MCNC: 117 MG/DL (ref 0–200)
DEPRECATED RDW RBC AUTO: 48.7 FL (ref 37–54)
EOSINOPHIL # BLD AUTO: 0.15 10*3/MM3 (ref 0–0.4)
EOSINOPHIL NFR BLD AUTO: 2.3 % (ref 0.3–6.2)
ERYTHROCYTE [DISTWIDTH] IN BLOOD BY AUTOMATED COUNT: 15 % (ref 12.3–15.4)
HBA1C MFR BLD: 6.2 % (ref 4.8–5.6)
HCT VFR BLD AUTO: 33.5 % (ref 37.5–51)
HDLC SERPL-MCNC: 40 MG/DL (ref 40–60)
HGB BLD-MCNC: 10.1 G/DL (ref 13–17.7)
IMM GRANULOCYTES # BLD AUTO: 0.01 10*3/MM3 (ref 0–0.05)
IMM GRANULOCYTES NFR BLD AUTO: 0.2 % (ref 0–0.5)
LDLC SERPL CALC-MCNC: 61 MG/DL (ref 0–100)
LDLC/HDLC SERPL: 1.53 {RATIO}
LYMPHOCYTES # BLD AUTO: 1.68 10*3/MM3 (ref 0.7–3.1)
LYMPHOCYTES NFR BLD AUTO: 25.3 % (ref 19.6–45.3)
MCH RBC QN AUTO: 26.6 PG (ref 26.6–33)
MCHC RBC AUTO-ENTMCNC: 30.1 G/DL (ref 31.5–35.7)
MCV RBC AUTO: 88.2 FL (ref 79–97)
MONOCYTES # BLD AUTO: 0.82 10*3/MM3 (ref 0.1–0.9)
MONOCYTES NFR BLD AUTO: 12.3 % (ref 5–12)
NEUTROPHILS NFR BLD AUTO: 3.93 10*3/MM3 (ref 1.7–7)
NEUTROPHILS NFR BLD AUTO: 59 % (ref 42.7–76)
NRBC BLD AUTO-RTO: 0 /100 WBC (ref 0–0.2)
PLATELET # BLD AUTO: 240 10*3/MM3 (ref 140–450)
PMV BLD AUTO: 11.4 FL (ref 6–12)
RBC # BLD AUTO: 3.8 10*6/MM3 (ref 4.14–5.8)
TRIGL SERPL-MCNC: 79 MG/DL (ref 0–150)
VLDLC SERPL-MCNC: 16 MG/DL (ref 5–40)
WBC # BLD AUTO: 6.65 10*3/MM3 (ref 3.4–10.8)

## 2021-07-07 PROCEDURE — 80061 LIPID PANEL: CPT | Performed by: NURSE PRACTITIONER

## 2021-07-07 PROCEDURE — 83036 HEMOGLOBIN GLYCOSYLATED A1C: CPT | Performed by: NURSE PRACTITIONER

## 2021-07-07 PROCEDURE — G0103 PSA SCREENING: HCPCS | Performed by: NURSE PRACTITIONER

## 2021-07-07 PROCEDURE — 80053 COMPREHEN METABOLIC PANEL: CPT | Performed by: NURSE PRACTITIONER

## 2021-07-07 PROCEDURE — G0439 PPPS, SUBSEQ VISIT: HCPCS | Performed by: NURSE PRACTITIONER

## 2021-07-07 PROCEDURE — 84443 ASSAY THYROID STIM HORMONE: CPT | Performed by: NURSE PRACTITIONER

## 2021-07-07 PROCEDURE — 85025 COMPLETE CBC W/AUTO DIFF WBC: CPT | Performed by: NURSE PRACTITIONER

## 2021-07-07 NOTE — PATIENT INSTRUCTIONS
Chronic Obstructive Pulmonary Disease  Chronic obstructive pulmonary disease (COPD) is a long-term (chronic) lung problem. When you have COPD, it is hard for air to get in and out of your lungs. Usually the condition gets worse over time, and your lungs will never return to normal. There are things you can do to keep yourself as healthy as possible.  · Your doctor may treat your condition with:  ? Medicines.  ? Oxygen.  ? Lung surgery.  · Your doctor may also recommend:  ? Rehabilitation. This includes steps to make your body work better. It may involve a team of specialists.  ? Quitting smoking, if you smoke.  ? Exercise and changes to your diet.  ? Comfort measures (palliative care).  Follow these instructions at home:  Medicines  · Take over-the-counter and prescription medicines only as told by your doctor.  · Talk to your doctor before taking any cough or allergy medicines. You may need to avoid medicines that cause your lungs to be dry.  Lifestyle  · If you smoke, stop. Smoking makes the problem worse. If you need help quitting, ask your doctor.  · Avoid being around things that make your breathing worse. This may include smoke, chemicals, and fumes.  · Stay active, but remember to rest as well.  · Learn and use tips on how to relax.  · Make sure you get enough sleep. Most adults need at least 7 hours of sleep every night.  · Eat healthy foods. Eat smaller meals more often. Rest before meals.  Controlled breathing  Learn and use tips on how to control your breathing as told by your doctor. Try:  · Breathing in (inhaling) through your nose for 1 second. Then, pucker your lips and breath out (exhale) through your lips for 2 seconds.  · Putting one hand on your belly (abdomen). Breathe in slowly through your nose for 1 second. Your hand on your belly should move out. Pucker your lips and breathe out slowly through your lips. Your hand on your belly should move in as you breathe out.    Controlled coughing  Learn  and use controlled coughing to clear mucus from your lungs. Follow these steps:  1. Lean your head a little forward.  2. Breathe in deeply.  3. Try to hold your breath for 3 seconds.  4. Keep your mouth slightly open while coughing 2 times.  5. Spit any mucus out into a tissue.  6. Rest and do the steps again 1 or 2 times as needed.  General instructions  · Make sure you get all the shots (vaccines) that your doctor recommends. Ask your doctor about a flu shot and a pneumonia shot.  · Use oxygen therapy and pulmonary rehabilitation if told by your doctor. If you need home oxygen therapy, ask your doctor if you should buy a tool to measure your oxygen level (oximeter).  · Make a COPD action plan with your doctor. This helps you to know what to do if you feel worse than usual.  · Manage any other conditions you have as told by your doctor.  · Avoid going outside when it is very hot, cold, or humid.  · Avoid people who have a sickness you can catch (contagious).  · Keep all follow-up visits as told by your doctor. This is important.  Contact a doctor if:  · You cough up more mucus than usual.  · There is a change in the color or thickness of the mucus.  · It is harder to breathe than usual.  · Your breathing is faster than usual.  · You have trouble sleeping.  · You need to use your medicines more often than usual.  · You have trouble doing your normal activities such as getting dressed or walking around the house.  Get help right away if:  · You have shortness of breath while resting.  · You have shortness of breath that stops you from:  ? Being able to talk.  ? Doing normal activities.  · Your chest hurts for longer than 5 minutes.  · Your skin color is more blue than usual.  · Your pulse oximeter shows that you have low oxygen for longer than 5 minutes.  · You have a fever.  · You feel too tired to breathe normally.  Summary  · Chronic obstructive pulmonary disease (COPD) is a long-term lung problem.  · The way your  lungs work will never return to normal. Usually the condition gets worse over time. There are things you can do to keep yourself as healthy as possible.  · Take over-the-counter and prescription medicines only as told by your doctor.  · If you smoke, stop. Smoking makes the problem worse.  This information is not intended to replace advice given to you by your health care provider. Make sure you discuss any questions you have with your health care provider.  Document Revised: 11/30/2018 Document Reviewed: 01/22/2018  [employer] Patient Education © 2021 [employer]  Heart Failure, Self Care  Heart failure is a serious condition. This sheet explains things you need to do to take care of yourself at home. To help you stay as healthy as possible, you may be asked to change your diet, take certain medicines, and make other changes in your life. Your doctor may also give you more specific instructions. If you have problems or questions, call your doctor.  What are the risks?  Having heart failure makes it more likely for you to have some problems. These problems can get worse if you do not take good care of yourself. Problems may include:  · Blood clotting problems. This may cause a stroke.  · Damage to the kidneys, liver, or lungs.  · Abnormal heart rhythms.  Supplies needed:  · Scale for weighing yourself.  · Blood pressure monitor.  · Notebook.  · Medicines.  How to care for yourself when you have heart failure  Medicines  Take over-the-counter and prescription medicines only as told by your doctor. Take your medicines every day.  · Do not stop taking your medicine unless your doctor tells you to do so.  · Do not skip any medicines.  · Get your prescriptions refilled before you run out of medicine. This is important.  Eating and drinking    · Eat heart-healthy foods. Talk with a diet specialist (dietitian) to create an eating plan.  · Choose foods that:  ? Have no trans fat.  ? Are low in saturated fat and  cholesterol.  · Choose healthy foods, such as:  ? Fresh or frozen fruits and vegetables.  ? Fish.  ? Low-fat (lean) meats.  ? Legumes, such as beans, peas, and lentils.  ? Fat-free or low-fat dairy products.  ? Whole-grain foods.  ? High-fiber foods.  · Limit salt (sodium) if told by your doctor. Ask your diet specialist to tell you which seasonings are healthy for your heart.  · Cook in healthy ways instead of frying. Healthy ways of cooking include roasting, grilling, broiling, baking, poaching, steaming, and stir-frying.  · Limit how much fluid you drink, if told by your doctor.  Alcohol use  · Do not drink alcohol if:  ? Your doctor tells you not to drink.  ? Your heart was damaged by alcohol, or you have very bad heart failure.  ? You are pregnant, may be pregnant, or are planning to become pregnant.  · If you drink alcohol:  ? Limit how much you use to:  § 0-1 drink a day for women.  § 0-2 drinks a day for men.  ? Be aware of how much alcohol is in your drink. In the [place] one drink equals one 12 oz bottle of beer (355 mL), one 5 oz glass of wine (148 mL), or one 1½ oz glass of hard liquor (44 mL).  Lifestyle    · Do not use any products that contain nicotine or tobacco, such as cigarettes, e-cigarettes, and chewing tobacco. If you need help quitting, ask your doctor.  ? Do not use nicotine gum or patches before talking to your doctor.  · Do not use illegal drugs.  · Lose weight if told by your doctor.  · Do physical activity if told by your doctor. Talk to your doctor before you begin an exercise if:  ? You are an older adult.  ? You have very bad heart failure.  · Learn to manage stress. If you need help, ask your doctor.  · Get rehab (rehabilitation) to help you stay independent and to help with your quality of life.  · Plan time to rest when you get tired.  Check weight and blood pressure    · Weigh yourself every day. This will help you to know if fluid is building up in your body.  ? Weigh yourself every  morning after you pee (urinate) and before you eat breakfast.  ? Wear the same amount of clothing each time.  ? Write down your daily weight. Give your record to your doctor.  · Check and write down your blood pressure as told by your doctor.  · Check your pulse as told by your doctor.  Dealing with very hot and very cold weather  · If it is very hot:  ? Avoid activities that take a lot of energy.  ? Use air conditioning or fans, or find a cooler place.  ? Avoid caffeine and alcohol.  ? Wear clothing that is loose-fitting, lightweight, and light-colored.  · If it is very cold:  ? Avoid activities that take a lot of energy.  ? Layer your clothes.  ? Wear mittens or gloves, a hat, and a scarf when you go outside.  ? Avoid alcohol.  Follow these instructions at home:  · Stay up to date with shots (vaccines). Get pneumococcal and flu (influenza) shots.  · Keep all follow-up visits as told by your doctor. This is important.  Contact a doctor if:  · You gain weight quickly.  · You have increasing shortness of breath.  · You cannot do your normal activities.  · You get tired easily.  · You cough a lot.  · You don't feel like eating or feel like you may vomit (nauseous).  · You become puffy (swell) in your hands, feet, ankles, or belly (abdomen).  · You cannot sleep well because it is hard to breathe.  · You feel like your heart is beating fast (palpitations).  · You get dizzy when you stand up.  Get help right away if:  · You have trouble breathing.  · You or someone else notices a change in your behavior, such as having trouble staying awake.  · You have chest pain or discomfort.  · You pass out (faint).  These symptoms may be an emergency. Do not wait to see if the symptoms will go away. Get medical help right away. Call your local emergency services (911 in the [place] Do not drive yourself to the hospital.  Summary  · Heart failure is a serious condition. To care for yourself, you may have to change your diet, take  medicines, and make other lifestyle changes.  · Take your medicines every day. Do not stop taking them unless your doctor tells you to do so.  · Eat heart-healthy foods, such as fresh or frozen fruits and vegetables, fish, lean meats, legumes, fat-free or low-fat dairy products, and whole-grain or high-fiber foods.  · Ask your doctor if you can drink alcohol. You may have to stop alcohol use if you have very bad heart failure.  · Contact your doctor if you gain weight quickly or feel that your heart is beating too fast. Get help right away if you pass out, or have chest pain or trouble breathing.  This information is not intended to replace advice given to you by your health care provider. Make sure you discuss any questions you have with your health care provider.  Document Revised: 03/31/2020 Document Reviewed: 04/01/2020  [employer] Patient Education © 2021 [employer]

## 2021-07-07 NOTE — PROGRESS NOTES
The ABCs of the Annual Wellness Visit  Subsequent Medicare Wellness Visit    Chief Complaint   Patient presents with   • Medicare Wellness-subsequent       Subjective   History of Present Illness:  Arvind Coates is a 85 y.o. male who presents for a Subsequent Medicare Wellness Visit.    HEALTH RISK ASSESSMENT    Recent Hospitalizations:  Recently treated at the following:  [facility]     Current Medical Providers:  Patient Care Team:  Paco Daniel APRN as PCP - General (Nurse Practitioner)    Smoking Status:  Social History     Tobacco Use   Smoking Status Never Smoker   Smokeless Tobacco Never Used       Alcohol Consumption:  Social History     Substance and Sexual Activity   Alcohol Use Never       Depression Screen:   PHQ-2/PHQ-9 Depression Screening 7/7/2021   Little interest or pleasure in doing things 0   Feeling down, depressed, or hopeless 0   Total Score 0       Fall Risk Screen:  STEADI Fall Risk Assessment was completed, and patient is at LOW risk for falls.Assessment completed on:7/7/2021    Health Habits and Functional and Cognitive Screening:  Functional & Cognitive Status 7/7/2021   Do you have difficulty preparing food and eating? No   Do you have difficulty bathing yourself, getting dressed or grooming yourself? No   Do you have difficulty using the toilet? No   Do you have difficulty moving around from place to place? No   Do you have trouble with steps or getting out of a bed or a chair? No   Current Diet Well Balanced Diet   Dental Exam Not up to date   Eye Exam Up to date   Exercise (times per week) 7 times per week   Do you need help using the phone?  No   Are you deaf or do you have serious difficulty hearing?  Yes   Do you need help with transportation? No   Do you need help shopping? No   Do you need help preparing meals?  No   Do you need help with housework?  No   Do you need help with laundry? No   Do you need help taking your medications? No   Do you need help managing  money? No   Do you ever drive or ride in a car without wearing a seat belt? No         Does the patient have evidence of cognitive impairment? No    Asprin use counseling:Start ASA 81 mg daily     Age-appropriate Screening Schedule:  Refer to the list below for future screening recommendations based on patient's age, sex and/or medical conditions. Orders for these recommended tests are listed in the plan section. The patient has been provided with a written plan.    Health Maintenance   Topic Date Due   • TDAP/TD VACCINES (1 - Tdap) Never done   • ZOSTER VACCINE (1 of 2) Never done   • INFLUENZA VACCINE  08/01/2021   • LIPID PANEL  07/07/2022          The following portions of the patient's history were reviewed and updated as appropriate: allergies, current medications, past family history, past medical history, past social history, past surgical history and problem list.    Outpatient Medications Prior to Visit   Medication Sig Dispense Refill   • albuterol sulfate  (90 Base) MCG/ACT inhaler      • aspirin 81 MG chewable tablet aspirin 81 mg oral tablet,chewable chew 1 tablet (81 mg) by oral route once daily   Active     • atorvastatin (LIPITOR) 40 MG tablet atorvastatin 40 mg oral tablet take 1 tablet (40 mg) by oral route once daily at bedtime   Active     • benzonatate (TESSALON) 100 MG capsule Take 100 mg by mouth 3 (Three) Times a Day.     • cetirizine (zyrTEC) 10 MG tablet Take 10 mg by mouth Daily.     • fluticasone (FLONASE) 50 MCG/ACT nasal spray SPRAY 2 SPRAYS IN EACH NOSTRIL BY INTRANASAL ROUTE ONCE DAILY AS NEEDED     • Ipratropium-Albuterol (ALBUTEROL-IPRATROPIUM IN) Duo Neb 3 ml inhalation Q 4 hours as needed   Active     • lisinopril (PRINIVIL,ZESTRIL) 10 MG tablet lisinopril 10 mg oral tablet take 1 tablet (10 mg) by oral route once daily   Active     • metoprolol succinate XL (TOPROL-XL) 25 MG 24 hr tablet metoprolol succinate 25 mg oral tablet extended release 24 hr take 1 tablet (25 mg)  "by oral route once daily for 30 days   Suspended     • warfarin (COUMADIN) 2 MG tablet warfarin 2 mg oral tablet take 1 tablet (2 mg) by oral route once daily   Active       No facility-administered medications prior to visit.       Patient Active Problem List   Diagnosis   • Pneumonia of both lungs due to infectious organism   • Atrial fibrillation (CMS/HCC)   • Cardiac pacemaker in situ   • CHF (congestive heart failure) (CMS/HCC)   • Coronary atherosclerosis   • Dependence on supplemental oxygen   • Hemorrhoids   • History of aortic valve replacement   • Hyperlipidemia   • Hypertension   • Personal history of other drug therapy   • Presence of aortocoronary bypass graft   • Sick sinus syndrome (CMS/HCC)   • Supraventricular tachycardia (CMS/HCC)   • CKD (chronic kidney disease) stage 4, GFR 15-29 ml/min (CMS/[facility])       Advanced Care Planning:  ACP discussion was held with the patient during this visit. Patient has an advance directive (not in EMR), copy requested.    Review of Systems    Compared to one year ago, the patient feels his physical health is worse.  Compared to one year ago, the patient feels his mental health is the same.    Reviewed chart for potential of high risk medication in the elderly: yes  Reviewed chart for potential of harmful drug interactions in the elderly:yes    Objective         Vitals:    07/07/21 1346   BP: 146/84   Pulse: 70   Temp: 98.6 °F (37 °C)   SpO2: 97%   Weight: 78.7 kg (173 lb 9.6 oz)   Height: 172.7 cm (68\")   PainSc: 0-No pain       Body mass index is 26.4 kg/m².  Discussed the patient's BMI with him. The BMI is in the acceptable range.    Physical Exam  Vitals reviewed.   Constitutional:       Appearance: Normal appearance. He is well-developed.   HENT:      Head: Normocephalic and atraumatic.      Right Ear: External ear normal.      Left Ear: External ear normal.      Mouth/Throat:      Pharynx: No oropharyngeal exudate.   Eyes:      Conjunctiva/sclera: Conjunctivae " normal.      Pupils: Pupils are equal, round, and reactive to light.   Cardiovascular:      Rate and Rhythm: Normal rate. Rhythm irregular.      Heart sounds: Murmur heard.   No friction rub. No gallop.    Pulmonary:      Effort: Pulmonary effort is normal.      Breath sounds: Normal breath sounds. No wheezing or rhonchi.   Abdominal:      General: Bowel sounds are normal. There is no distension.      Palpations: Abdomen is soft.      Tenderness: There is no abdominal tenderness.   Skin:     General: Skin is warm and dry.   Neurological:      Mental Status: He is alert and oriented to person, place, and time.      Cranial Nerves: No cranial nerve deficit.   Psychiatric:         Mood and Affect: Mood and affect normal.         Behavior: Behavior normal.         Thought Content: Thought content normal.         Judgment: Judgment normal.         Lab Results   Component Value Date    TRIG 79 07/07/2021    HDL 40 07/07/2021    LDL 61 07/07/2021    VLDL 16 07/07/2021    HGBA1C 6.20 (H) 07/07/2021        Assessment/Plan   Medicare Risks and Personalized Health Plan  CMS Preventative Services Quick Reference  Cardiovascular risk  Depression/Dysphoria  Polypharmacy    The above risks/problems have been discussed with the patient.  Pertinent information has been shared with the patient in the After Visit Summary.  Follow up plans and orders are seen below in the Assessment/Plan Section.    Diagnoses and all orders for this visit:    1. Chronic congestive heart failure, unspecified heart failure type (CMS/[facility]) (Primary)  Assessment & Plan:  He sees cardiology in [place].  Continue follow-up with cardiologist.    Orders:  -     TSH  -     CBC & Differential  -     Comprehensive Metabolic Panel    2. Essential hypertension  Assessment & Plan:  Per home blood pressure readings blood pressure is fairly controlled.  Continue monitoring blood pressure.      3. CKD (chronic kidney disease) stage 4, GFR 15-29 ml/min  (CMS/[facility])  Assessment & Plan:  Follow-up with nephrologist      4. Mixed hyperlipidemia  -     Lipid Panel    5. Elevated blood sugar  -     Hemoglobin A1c    6. Screening for prostate cancer  -     PSA Screen    7. Atrial fibrillation, unspecified type (CMS/[facility])  Assessment & Plan:  Continue Coumadin.  Anticoagulation therapy is monitored by cardiologist      Follow Up:  Return in about 3 months (around 10/7/2021) for Next scheduled follow up.     An After Visit Summary and PPPS were given to the patient.

## 2021-07-08 PROBLEM — N18.4 CKD (CHRONIC KIDNEY DISEASE) STAGE 4, GFR 15-29 ML/MIN: Status: ACTIVE | Noted: 2021-07-08

## 2021-07-08 LAB
ALBUMIN SERPL-MCNC: 4.3 G/DL (ref 3.5–5.2)
ALBUMIN/GLOB SERPL: 1.7 G/DL
ALP SERPL-CCNC: 70 U/L (ref 39–117)
ALT SERPL W P-5'-P-CCNC: 11 U/L (ref 1–41)
ANION GAP SERPL CALCULATED.3IONS-SCNC: 10.5 MMOL/L (ref 5–15)
AST SERPL-CCNC: 22 U/L (ref 1–40)
BILIRUB SERPL-MCNC: 1 MG/DL (ref 0–1.2)
BUN SERPL-MCNC: 21 MG/DL (ref 8–23)
BUN/CREAT SERPL: 14 (ref 7–25)
CALCIUM SPEC-SCNC: 9.1 MG/DL (ref 8.6–10.5)
CHLORIDE SERPL-SCNC: 106 MMOL/L (ref 98–107)
CO2 SERPL-SCNC: 24.5 MMOL/L (ref 22–29)
CREAT SERPL-MCNC: 1.5 MG/DL (ref 0.76–1.27)
GFR SERPL CREATININE-BSD FRML MDRD: 44 ML/MIN/1.73
GLOBULIN UR ELPH-MCNC: 2.5 GM/DL
GLUCOSE SERPL-MCNC: 86 MG/DL (ref 65–99)
POTASSIUM SERPL-SCNC: 4.1 MMOL/L (ref 3.5–5.2)
PROT SERPL-MCNC: 6.8 G/DL (ref 6–8.5)
PSA SERPL-MCNC: 5.97 NG/ML (ref 0–4)
SODIUM SERPL-SCNC: 141 MMOL/L (ref 136–145)
TSH SERPL DL<=0.05 MIU/L-ACNC: 4.34 UIU/ML (ref 0.27–4.2)

## 2021-07-08 NOTE — ASSESSMENT & PLAN NOTE
Per home blood pressure readings blood pressure is fairly controlled.  Continue monitoring blood pressure.

## 2021-07-15 VITALS
WEIGHT: 181 LBS | SYSTOLIC BLOOD PRESSURE: 132 MMHG | TEMPERATURE: 97.3 F | HEART RATE: 86 BPM | BODY MASS INDEX: 27.43 KG/M2 | DIASTOLIC BLOOD PRESSURE: 78 MMHG | HEIGHT: 68 IN | OXYGEN SATURATION: 97 %

## 2021-09-27 ENCOUNTER — OFFICE VISIT (OUTPATIENT)
Dept: PULMONOLOGY | Facility: CLINIC | Age: 86
End: 2021-09-27

## 2021-09-27 ENCOUNTER — HOSPITAL ENCOUNTER (OUTPATIENT)
Dept: GENERAL RADIOLOGY | Facility: HOSPITAL | Age: 86
Discharge: HOME | End: 2021-09-27
Admitting: SPECIALIST

## 2021-09-27 VITALS
TEMPERATURE: 98.2 F | BODY MASS INDEX: 25.82 KG/M2 | HEIGHT: 68 IN | OXYGEN SATURATION: 94 % | SYSTOLIC BLOOD PRESSURE: 135 MMHG | WEIGHT: 170.4 LBS | DIASTOLIC BLOOD PRESSURE: 65 MMHG | HEART RATE: 68 BPM | RESPIRATION RATE: 19 BRPM

## 2021-09-27 DIAGNOSIS — J18.9 PNEUMONIA OF BOTH LUNGS DUE TO INFECTIOUS ORGANISM, UNSPECIFIED PART OF LUNG: ICD-10-CM

## 2021-09-27 DIAGNOSIS — R09.02 HYPOXEMIA: Primary | ICD-10-CM

## 2021-09-27 PROCEDURE — 71046 X-RAY EXAM CHEST 2 VIEWS: CPT

## 2021-09-27 PROCEDURE — 99213 OFFICE O/P EST LOW 20 MIN: CPT | Performed by: SPECIALIST

## 2021-09-27 RX ORDER — CEFDINIR 300 MG/1
CAPSULE ORAL
COMMUNITY
End: 2021-10-08

## 2021-09-27 RX ORDER — PREDNISONE 20 MG/1
40 TABLET ORAL DAILY
Qty: 14 TABLET | Refills: 0 | Status: SHIPPED | OUTPATIENT
Start: 2021-09-27 | End: 2021-10-08

## 2021-09-27 NOTE — PROGRESS NOTES
Progress note    CHIEF COMPLAINT  Chief Complaint   Patient presents with   • Pneumonia     3mo f/u    • Shortness of Breath   • Cough        Primary Care Provider  Paco Daniel APRN     Referring Provider  No ref. provider found    Patient Complaint    ICD-10-CM ICD-9-CM   1. Hypoxemia  R09.02 799.02   2. Pneumonia of both lungs due to infectious organism, unspecified part of lung  J18.9 483.8          Subjective          History of Presenting Illness  Arvind Coates is a 85 y.o. male on oxygen and with a history of COVID-19.And significant hypoxemia.  Patient is feeling much better at this time he is never a smoker and present medications are helping and admits for shortness of breath on moderate to severe exertion and coughing.  He is retired.  Patient had the chest x-ray done showed stable areas of scarring and fibrosis in the lung fields right is greater than the left.  The CT scan done in May 11, 2021 is reviewed which showed multifocal bilateral patchy linear opacities and at that time patient had a COVID-19 infection but the chest x-ray is looking better and the patient also feeling better.  Other review the systems are noncontributory.    I have personally reviewed the review of systems, past family, social, medical and surgical histories; and agree with their findings.         HISTORY    Family History   Problem Relation Age of Onset   • Colon cancer Brother    • Prostate cancer Brother    • Diabetes Other         Social History     Socioeconomic History   • Marital status: Single     Spouse name: Not on file   • Number of children: Not on file   • Years of education: Not on file   • Highest education level: Not on file   Tobacco Use   • Smoking status: Never Smoker   • Smokeless tobacco: Never Used   Vaping Use   • Vaping Use: Never used   Substance and Sexual Activity   • Alcohol use: Never   • Drug use: Defer   • Sexual activity: Defer        Past Medical History:   Diagnosis Date   • Broken bones     • CHF (congestive heart failure) (CMS/HCC)    • COVID-19    • Hemorrhoids    • Hyperlipidemia    • Hypertension    • Pneumonia    • Shortness of breath         Immunization History   Administered Date(s) Administered   • COVID-19 (MODERNA) 03/10/2021       No Known Allergies       Current Outpatient Medications:   •  albuterol sulfate  (90 Base) MCG/ACT inhaler, , Disp: , Rfl:   •  aspirin 81 MG chewable tablet, aspirin 81 mg oral tablet,chewable chew 1 tablet (81 mg) by oral route once daily   Active, Disp: , Rfl:   •  atorvastatin (LIPITOR) 40 MG tablet, atorvastatin 40 mg oral tablet take 1 tablet (40 mg) by oral route once daily at bedtime   Active, Disp: , Rfl:   •  fluticasone (FLONASE) 50 MCG/ACT nasal spray, SPRAY 2 SPRAYS IN EACH NOSTRIL BY INTRANASAL ROUTE ONCE DAILY AS NEEDED, Disp: , Rfl:   •  Ipratropium-Albuterol (ALBUTEROL-IPRATROPIUM IN), Duo Neb 3 ml inhalation Q 4 hours as needed   Active, Disp: , Rfl:   •  lisinopril (PRINIVIL,ZESTRIL) 10 MG tablet, lisinopril 10 mg oral tablet take 1 tablet (10 mg) by oral route once daily   Active, Disp: , Rfl:   •  metoprolol succinate XL (TOPROL-XL) 25 MG 24 hr tablet, metoprolol succinate 25 mg oral tablet extended release 24 hr take 1 tablet (25 mg) by oral route once daily for 30 days   Suspended, Disp: , Rfl:   •  O2 (OXYGEN), Inhale 2 L/min 1 (One) Time., Disp: , Rfl:   •  warfarin (COUMADIN) 2 MG tablet, warfarin 2 mg oral tablet take 1 tablet (2 mg) by oral route once daily   Active, Disp: , Rfl:   •  benzonatate (TESSALON) 100 MG capsule, Take 100 mg by mouth 3 (Three) Times a Day., Disp: , Rfl:   •  cefdinir (OMNICEF) 300 MG capsule, cefdinir 300 mg oral capsule take 1 capsule (300 mg) by oral route every 12 hours for 5 days   Suspended, Disp: , Rfl:   •  cetirizine (zyrTEC) 10 MG tablet, Take 10 mg by mouth Daily., Disp: , Rfl:   •  predniSONE (DELTASONE) 20 MG tablet, Take 2 tablets by mouth Daily., Disp: 14 tablet, Rfl: 0     Smoking  status never    Objective     Vital Signs:   Vitals:    09/27/21 1015   BP: 135/65   Pulse: 68   Resp: 19   Temp: 98.2 °F (36.8 °C)   SpO2: 94%        Physical Exam:  Pleasant gentleman and not in any apparent respiratory distress and on oxygen.  HEENT: Atraumatic anicteric.  Neck: Supple.  Chest and lungs: Nontender and relatively clear to auscultation.  Cardiovascular system: Regular rate and rhythm.  Abdomen: Soft and nontender.  Extremities: No clubbing/no cyanosis/no edema.  Central nervous system is grossly intact.     Result Review :   I have personally reviewed the CAT scan and other is as noted above.         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  Patient with a history of cardiac problems as noted above.    Plan:  Continue the present therapy.  If the patient has any problem in between call me.  Follow-up with the cardiology and primary team physician closely as before.  Continue the oxygen for now.  Follow-up in 3 months with the high-resolution CT scan of the chest.  As the CT scan still shows some changes and patient is somewhat short of breath, I decided good and give him the prednisone 20 mg p.o. daily for 7 days and call me if he is not feeling any better.  Follow Up   Return in about 3 months (around 12/27/2021).  Patient was given instructions and counseling regarding his condition or for health maintenance advice. Please see specific information pulled into the AVS if appropriate.     Sky Morin MD

## 2021-10-08 ENCOUNTER — OFFICE VISIT (OUTPATIENT)
Dept: FAMILY MEDICINE CLINIC | Facility: CLINIC | Age: 86
End: 2021-10-08

## 2021-10-08 VITALS
HEIGHT: 68 IN | DIASTOLIC BLOOD PRESSURE: 82 MMHG | OXYGEN SATURATION: 96 % | HEART RATE: 87 BPM | WEIGHT: 177 LBS | BODY MASS INDEX: 26.83 KG/M2 | TEMPERATURE: 97.5 F | SYSTOLIC BLOOD PRESSURE: 140 MMHG

## 2021-10-08 DIAGNOSIS — N18.4 CKD (CHRONIC KIDNEY DISEASE) STAGE 4, GFR 15-29 ML/MIN (HCC): ICD-10-CM

## 2021-10-08 DIAGNOSIS — R97.20 ELEVATED PSA: ICD-10-CM

## 2021-10-08 DIAGNOSIS — I50.9 CHRONIC CONGESTIVE HEART FAILURE, UNSPECIFIED HEART FAILURE TYPE (HCC): ICD-10-CM

## 2021-10-08 DIAGNOSIS — I10 PRIMARY HYPERTENSION: Primary | ICD-10-CM

## 2021-10-08 DIAGNOSIS — R73.03 PREDIABETES: ICD-10-CM

## 2021-10-08 DIAGNOSIS — I48.91 ATRIAL FIBRILLATION, UNSPECIFIED TYPE (HCC): ICD-10-CM

## 2021-10-08 DIAGNOSIS — Z23 NEED FOR INFLUENZA VACCINATION: ICD-10-CM

## 2021-10-08 LAB
ALBUMIN SERPL-MCNC: 4.6 G/DL (ref 3.5–5.2)
ALBUMIN/GLOB SERPL: 1.8 G/DL
ALP SERPL-CCNC: 74 U/L (ref 39–117)
ALT SERPL W P-5'-P-CCNC: 38 U/L (ref 1–41)
ANION GAP SERPL CALCULATED.3IONS-SCNC: 9.6 MMOL/L (ref 5–15)
AST SERPL-CCNC: 27 U/L (ref 1–40)
BASOPHILS # BLD AUTO: 0.01 10*3/MM3 (ref 0–0.2)
BASOPHILS NFR BLD AUTO: 0.1 % (ref 0–1.5)
BILIRUB SERPL-MCNC: 0.9 MG/DL (ref 0–1.2)
BUN SERPL-MCNC: 36 MG/DL (ref 8–23)
BUN/CREAT SERPL: 32.1 (ref 7–25)
CALCIUM SPEC-SCNC: 9.4 MG/DL (ref 8.6–10.5)
CHLORIDE SERPL-SCNC: 106 MMOL/L (ref 98–107)
CO2 SERPL-SCNC: 26.4 MMOL/L (ref 22–29)
CREAT SERPL-MCNC: 1.12 MG/DL (ref 0.76–1.27)
DEPRECATED RDW RBC AUTO: 48.6 FL (ref 37–54)
EOSINOPHIL # BLD AUTO: 0 10*3/MM3 (ref 0–0.4)
EOSINOPHIL NFR BLD AUTO: 0 % (ref 0.3–6.2)
ERYTHROCYTE [DISTWIDTH] IN BLOOD BY AUTOMATED COUNT: 16.7 % (ref 12.3–15.4)
GFR SERPL CREATININE-BSD FRML MDRD: 62 ML/MIN/1.73
GLOBULIN UR ELPH-MCNC: 2.6 GM/DL
GLUCOSE SERPL-MCNC: 150 MG/DL (ref 65–99)
HBA1C MFR BLD: 6.28 % (ref 4.8–5.6)
HCT VFR BLD AUTO: 34.1 % (ref 37.5–51)
HGB BLD-MCNC: 9.9 G/DL (ref 13–17.7)
IMM GRANULOCYTES # BLD AUTO: 0.03 10*3/MM3 (ref 0–0.05)
IMM GRANULOCYTES NFR BLD AUTO: 0.4 % (ref 0–0.5)
LYMPHOCYTES # BLD AUTO: 0.61 10*3/MM3 (ref 0.7–3.1)
LYMPHOCYTES NFR BLD AUTO: 8.1 % (ref 19.6–45.3)
MCH RBC QN AUTO: 23.3 PG (ref 26.6–33)
MCHC RBC AUTO-ENTMCNC: 29 G/DL (ref 31.5–35.7)
MCV RBC AUTO: 80.4 FL (ref 79–97)
MONOCYTES # BLD AUTO: 0.15 10*3/MM3 (ref 0.1–0.9)
MONOCYTES NFR BLD AUTO: 2 % (ref 5–12)
NEUTROPHILS NFR BLD AUTO: 6.7 10*3/MM3 (ref 1.7–7)
NEUTROPHILS NFR BLD AUTO: 89.4 % (ref 42.7–76)
NRBC BLD AUTO-RTO: 0.4 /100 WBC (ref 0–0.2)
PLATELET # BLD AUTO: 201 10*3/MM3 (ref 140–450)
PMV BLD AUTO: 12 FL (ref 6–12)
POTASSIUM SERPL-SCNC: 4.9 MMOL/L (ref 3.5–5.2)
PROT SERPL-MCNC: 7.2 G/DL (ref 6–8.5)
PSA SERPL-MCNC: 5.94 NG/ML (ref 0–4)
RBC # BLD AUTO: 4.24 10*6/MM3 (ref 4.14–5.8)
SODIUM SERPL-SCNC: 142 MMOL/L (ref 136–145)
TSH SERPL DL<=0.05 MIU/L-ACNC: 2.83 UIU/ML (ref 0.27–4.2)
WBC # BLD AUTO: 7.5 10*3/MM3 (ref 3.4–10.8)

## 2021-10-08 PROCEDURE — 83036 HEMOGLOBIN GLYCOSYLATED A1C: CPT | Performed by: NURSE PRACTITIONER

## 2021-10-08 PROCEDURE — 85025 COMPLETE CBC W/AUTO DIFF WBC: CPT | Performed by: NURSE PRACTITIONER

## 2021-10-08 PROCEDURE — 80053 COMPREHEN METABOLIC PANEL: CPT | Performed by: NURSE PRACTITIONER

## 2021-10-08 PROCEDURE — 84443 ASSAY THYROID STIM HORMONE: CPT | Performed by: NURSE PRACTITIONER

## 2021-10-08 PROCEDURE — 36415 COLL VENOUS BLD VENIPUNCTURE: CPT | Performed by: NURSE PRACTITIONER

## 2021-10-08 PROCEDURE — 81001 URINALYSIS AUTO W/SCOPE: CPT | Performed by: NURSE PRACTITIONER

## 2021-10-08 PROCEDURE — 90662 IIV NO PRSV INCREASED AG IM: CPT | Performed by: NURSE PRACTITIONER

## 2021-10-08 PROCEDURE — 99214 OFFICE O/P EST MOD 30 MIN: CPT | Performed by: NURSE PRACTITIONER

## 2021-10-08 PROCEDURE — 84153 ASSAY OF PSA TOTAL: CPT | Performed by: NURSE PRACTITIONER

## 2021-10-08 PROCEDURE — G0008 ADMIN INFLUENZA VIRUS VAC: HCPCS | Performed by: NURSE PRACTITIONER

## 2021-10-08 NOTE — PROGRESS NOTES
"Chief Complaint  Follow-up and Congestive Heart Failure    Subjective          Arvind Coates presents to [facility]  History of Present Illness  Presents today for follow-up on prediabetes. And chronic kidney disease.  He has a history of coronary artery disease, atrial fibrillation, hypertension, and hyperlipidemia.  He sees cardiology frequently.  He is recovering from COVID-19 pneumonia from a couple months ago.  Still on oxygen 2 L nasal cannula.  Objective   Vital Signs:   /82   Pulse 87   Temp 97.5 °F (36.4 °C)   Ht 172.7 cm (68\")   Wt 80.3 kg (177 lb)   SpO2 96%   BMI 26.91 kg/m²     Physical Exam  Vitals reviewed.   Constitutional:       Appearance: Normal appearance. He is well-developed.   HENT:      Head: Normocephalic and atraumatic.      Right Ear: External ear normal.      Left Ear: External ear normal.      Mouth/Throat:      Pharynx: No oropharyngeal exudate.   Eyes:      Conjunctiva/sclera: Conjunctivae normal.      Pupils: Pupils are equal, round, and reactive to light.   Cardiovascular:      Rate and Rhythm: Normal rate and regular rhythm.      Heart sounds: No murmur heard.  No friction rub. No gallop.    Pulmonary:      Effort: Pulmonary effort is normal.      Breath sounds: Normal breath sounds. No wheezing or rhonchi.   Abdominal:      General: Bowel sounds are normal. There is no distension.      Palpations: Abdomen is soft.      Tenderness: There is no abdominal tenderness.   Skin:     General: Skin is warm and dry.   Neurological:      Mental Status: He is alert and oriented to person, place, and time.      Cranial Nerves: No cranial nerve deficit.   Psychiatric:         Mood and Affect: Mood and affect normal.         Behavior: Behavior normal.         Thought Content: Thought content normal.         Judgment: Judgment normal.        Result Review :                 Assessment and Plan    Diagnoses and all orders for this visit:    1. Primary " hypertension (Primary)  -     CBC & Differential  -     Comprehensive Metabolic Panel  -     TSH Rfx On Abnormal To Free T4    2. Elevated PSA  -     PSA DIAGNOSTIC ONLY    3. Chronic congestive heart failure, unspecified heart failure type (HCC)    4. Atrial fibrillation, unspecified type (HCC)    5. CKD (chronic kidney disease) stage 4, GFR 15-29 ml/min ([facility])  -     Urinalysis With Culture If Indicated - Urine, Clean Catch  -     Urinalysis, Microscopic Only - Urine, Clean Catch    6. Prediabetes  -     Hemoglobin A1c    7. Need for influenza vaccination  -     Fluzone High-Dose 65+yrs ([id number])    Continue follow-ups with cardiologist for atrial fibrillation Coumadin monitoring, congestive heart failure, and hypertension.  Will check hemoglobin A1c for PD diabetes.  Also check a CMP and urinalysis for chronic kidney disease.      Follow Up   Return in about 3 months (around 1/8/2022), or if symptoms worsen or fail to improve, for Next scheduled follow up.  Patient was given instructions and counseling regarding his condition or for health maintenance advice. Please see specific information pulled into the AVS if appropriate.

## 2021-10-09 LAB
BACTERIA UR QL AUTO: NORMAL /HPF
BILIRUB UR QL STRIP: NEGATIVE
CLARITY UR: CLEAR
COLOR UR: YELLOW
GLUCOSE UR STRIP-MCNC: NEGATIVE MG/DL
HGB UR QL STRIP.AUTO: NEGATIVE
HYALINE CASTS UR QL AUTO: NORMAL /LPF
KETONES UR QL STRIP: NEGATIVE
LEUKOCYTE ESTERASE UR QL STRIP.AUTO: NEGATIVE
NITRITE UR QL STRIP: NEGATIVE
PH UR STRIP.AUTO: 5.5 [PH] (ref 5–8)
PROT UR QL STRIP: ABNORMAL
RBC # UR: NORMAL /HPF
REF LAB TEST METHOD: NORMAL
SP GR UR STRIP: 1.02 (ref 1–1.03)
SQUAMOUS #/AREA URNS HPF: NORMAL /HPF
UROBILINOGEN UR QL STRIP: ABNORMAL
WBC UR QL AUTO: NORMAL /HPF

## 2021-12-30 ENCOUNTER — HOSPITAL ENCOUNTER (OUTPATIENT)
Dept: CT IMAGING | Facility: HOSPITAL | Age: 86
Discharge: HOME | End: 2021-12-30
Admitting: SPECIALIST

## 2021-12-30 DIAGNOSIS — R09.02 HYPOXEMIA: ICD-10-CM

## 2021-12-30 DIAGNOSIS — J18.9 PNEUMONIA OF BOTH LUNGS DUE TO INFECTIOUS ORGANISM, UNSPECIFIED PART OF LUNG: ICD-10-CM

## 2021-12-30 PROCEDURE — 71250 CT THORAX DX C-: CPT

## 2022-01-13 ENCOUNTER — OFFICE VISIT (OUTPATIENT)
Dept: PULMONOLOGY | Facility: CLINIC | Age: 87
End: 2022-01-13

## 2022-01-13 VITALS
OXYGEN SATURATION: 94 % | HEIGHT: 69 IN | RESPIRATION RATE: 16 BRPM | BODY MASS INDEX: 25.56 KG/M2 | TEMPERATURE: 98.2 F | WEIGHT: 172.6 LBS

## 2022-01-13 DIAGNOSIS — Z99.81 DEPENDENCE ON SUPPLEMENTAL OXYGEN: ICD-10-CM

## 2022-01-13 DIAGNOSIS — R06.09 DYSPNEA ON EXERTION: ICD-10-CM

## 2022-01-13 DIAGNOSIS — I50.9 CHRONIC CONGESTIVE HEART FAILURE, UNSPECIFIED HEART FAILURE TYPE: ICD-10-CM

## 2022-01-13 DIAGNOSIS — J90 PLEURAL EFFUSION: ICD-10-CM

## 2022-01-13 DIAGNOSIS — J43.9 PULMONARY EMPHYSEMA, UNSPECIFIED EMPHYSEMA TYPE: Primary | ICD-10-CM

## 2022-01-13 DIAGNOSIS — I48.91 ATRIAL FIBRILLATION, UNSPECIFIED TYPE: ICD-10-CM

## 2022-01-13 DIAGNOSIS — I10 PRIMARY HYPERTENSION: ICD-10-CM

## 2022-01-13 PROCEDURE — 99214 OFFICE O/P EST MOD 30 MIN: CPT | Performed by: NURSE PRACTITIONER

## 2022-01-13 RX ORDER — METOPROLOL SUCCINATE 50 MG/1
75 TABLET, EXTENDED RELEASE ORAL DAILY
COMMUNITY
Start: 2021-10-25 | End: 2022-05-19

## 2022-01-13 NOTE — PROGRESS NOTES
Primary Care Provider  Paco Daniel APRN     Referring Provider  No ref. provider found     Chief Complaint  Follow-up (ct scan 12/30/2021)    Subjective          Arvind Coates presents to [facility]  History of Present Illness  Arvind Coates is a 86 y.o. male patient previously seen by Dr. Morin for pneumonia and shortness of breath.  He is here for a follow-up visit today.    Patient states that he is doing very well since his last visit.  He denies using any antibiotics or steroids for his lungs.  He intermittently will use his albuterol or nebulizer machine.  He states that he will use his nebulizer in the morning and his albuterol inhaler in the afternoon.  Explained to patient that these are rescue medications, and if he does not feel that he needs them, he can go without using them.  Patient verbalized understanding.  He did have a repeat chest CT on 12/30/2021 which shows mild emphysema, slightly improved groundglass opacities, no peripheral honeycombing to suggest pulmonary fibrosis, no acute infiltrates or suspicious nodules, and a slight increase in bilateral pleural effusions right greater than the left.  After speaking with Dr. Brand, there is no further testing that needs to be done on this.  Patient denies any fevers or chills.  He describes his shortness of breath as very mild in severity, worse with exertion and improved with rest.  He does continue to wear 2 L of oxygen at home as needed.  Patient states that at times he will try to walk to the garage and back, and will sometimes have saturations in the 80s by the time he returns to the house.  He states that his main difficulty is lying down at night due to feelings of dizziness.  Overall, patient has no other concerns at this time.  He is scheduled to follow-up with cardiology in March, and again in June.  He is up-to-date with his flu and COVID vaccines.  He has not received a  pneumonia vaccine.     His history of smoking is      Tobacco Use: Low Risk    • Smoking Tobacco Use: Never Smoker   • Smokeless Tobacco Use: Never Used   .    Review of Systems   Constitutional: Negative for chills, fatigue, fever, unexpected weight gain and unexpected weight loss.   HENT: Negative for congestion (Nasal), hearing loss, mouth sores, nosebleeds, postnasal drip, sore throat and trouble swallowing.    Eyes: Negative for blurred vision and visual disturbance.   Respiratory: Positive for shortness of breath. Negative for apnea, cough and wheezing.         Negative for Hemoptysis     Cardiovascular: Negative for chest pain, palpitations and leg swelling.   Gastrointestinal: Negative for abdominal pain, constipation, diarrhea, nausea, vomiting and GERD.        Negative for Jaundice  Negative for Bloating  Negative for Melena   Musculoskeletal: Negative for joint swelling and myalgias.        Negative for Joint pain  Negative for Joint stiffness   Skin: Negative for color change.        Negative for cyanosis   Neurological: Negative for syncope, weakness, numbness and headache.      Sleep: Negative for Excessive daytime sleepiness  Negative for morning headaches  Negative for Snoring    Family History   Problem Relation Age of Onset   • Colon cancer Brother    • Prostate cancer Brother    • Diabetes Other         Social History     Socioeconomic History   • Marital status: Single   Tobacco Use   • Smoking status: Never Smoker   • Smokeless tobacco: Never Used   Vaping Use   • Vaping Use: Never used   Substance and Sexual Activity   • Alcohol use: Never   • Drug use: Defer   • Sexual activity: Defer        Past Medical History:   Diagnosis Date   • Broken bones    • CHF (congestive heart failure) (HCC)    • COVID-19    • Hemorrhoids    • Hyperlipidemia    • Hypertension    • Pneumonia    • Shortness of breath         Immunization History   Administered Date(s) Administered   • COVID-19 (MODERNA) 1st, 2nd,  3rd Dose Only 03/10/2021   • Fluzone High-Dose 65+yrs 10/08/2021         No Known Allergies       Current Outpatient Medications:   •  albuterol sulfate  (90 Base) MCG/ACT inhaler, , Disp: , Rfl:   •  aspirin 81 MG chewable tablet, aspirin 81 mg oral tablet,chewable chew 1 tablet (81 mg) by oral route once daily   Active, Disp: , Rfl:   •  atorvastatin (LIPITOR) 40 MG tablet, atorvastatin 40 mg oral tablet take 1 tablet (40 mg) by oral route once daily at bedtime   Active, Disp: , Rfl:   •  cetirizine (zyrTEC) 10 MG tablet, Take 10 mg by mouth Daily., Disp: , Rfl:   •  fluticasone (FLONASE) 50 MCG/ACT nasal spray, SPRAY 2 SPRAYS IN EACH NOSTRIL BY INTRANASAL ROUTE ONCE DAILY AS NEEDED, Disp: , Rfl:   •  Ipratropium-Albuterol (ALBUTEROL-IPRATROPIUM IN), Duo Neb 3 ml inhalation Q 4 hours as needed   Active, Disp: , Rfl:   •  lisinopril (PRINIVIL,ZESTRIL) 10 MG tablet, lisinopril 10 mg oral tablet take 1 tablet (10 mg) by oral route once daily   Active, Disp: , Rfl:   •  metoprolol succinate XL (TOPROL-XL) 25 MG 24 hr tablet, metoprolol succinate 25 mg oral tablet extended release 24 hr take 1 tablet (25 mg) by oral route once daily for 30 days   Suspended, Disp: , Rfl:   •  metoprolol succinate XL (TOPROL-XL) 50 MG 24 hr tablet, Take 75 mg by mouth Daily., Disp: , Rfl:   •  O2 (OXYGEN), Inhale 2 L/min 1 (One) Time., Disp: , Rfl:   •  warfarin (COUMADIN) 2 MG tablet, warfarin 2 mg oral tablet take 1 tablet (2 mg) by oral route once daily   Active, Disp: , Rfl:   •  benzonatate (TESSALON) 100 MG capsule, Take 100 mg by mouth 3 (Three) Times a Day., Disp: , Rfl:      Objective   Physical Exam  Constitutional:       General: He is not in acute distress.     Appearance: Normal appearance. He is normal weight.   HENT:      Right Ear: Hearing normal.      Left Ear: Hearing normal.      Nose: No nasal tenderness or congestion.      Mouth/Throat:      Mouth: Mucous membranes are moist. No oral lesions.   Eyes:       "Extraocular Movements: Extraocular movements intact.      Pupils: Pupils are equal, round, and reactive to light.   Neck:      Thyroid: No thyroid mass or thyromegaly.   Cardiovascular:      Rate and Rhythm: Normal rate and regular rhythm.      Pulses: Normal pulses.      Heart sounds: Normal heart sounds. No murmur heard.      Pulmonary:      Effort: Pulmonary effort is normal.      Breath sounds: Normal breath sounds. No wheezing, rhonchi or rales.   Chest:   Breasts:      Right: No axillary adenopathy.       Abdominal:      General: Bowel sounds are normal. There is no distension.      Palpations: Abdomen is soft.      Tenderness: There is no abdominal tenderness.   Musculoskeletal:      Cervical back: Neck supple.      Right lower leg: No edema.      Left lower leg: No edema.   Lymphadenopathy:      Cervical: No cervical adenopathy.      Upper Body:      Right upper body: No axillary adenopathy.   Skin:     General: Skin is warm and dry.      Findings: No lesion or rash.   Neurological:      General: No focal deficit present.      Mental Status: He is alert and oriented to person, place, and time.      Cranial Nerves: Cranial nerves are intact.   Psychiatric:         Mood and Affect: Affect normal. Mood is not anxious or depressed.         Vital Signs:   Temp 98.2 °F (36.8 °C) (Tympanic)   Resp 16   Ht 175.3 cm (69\")   Wt 78.3 kg (172 lb 9.6 oz)   SpO2 94% Comment: room air  BMI 25.49 kg/m²        Result Review :     CMP    CMP 4/4/21 7/7/21 10/8/21   Glucose 107 (A) 86 150 (A)   BUN 48 (A) 21 36 (A)   Creatinine 1.99 (A) 1.50 (A) 1.12   eGFR Non African Am  44 (A) 62   Sodium 139 141 142   Potassium 4.6 4.1 4.9   Chloride 108 106 106   Calcium 9.3 9.1 9.4   Albumin  4.30 4.60   Total Bilirubin  1.0 0.9   Alkaline Phosphatase  70 74   AST (SGOT)  22 27   ALT (SGPT)  11 38   (A) Abnormal value            CBC w/diff    CBC w/Diff 4/3/21 7/7/21 10/8/21   WBC 8.36 6.65 7.50   RBC 4.62 (A) 3.80 (A) 4.24 "   Hemoglobin 13.4 (A) 10.1 (A) 9.9 (A)   Hematocrit 42.7 33.5 (A) 34.1 (A)   MCV 92.4 88.2 80.4   MCH 29.0 26.6 23.3 (A)   MCHC 31.4 (A) 30.1 (A) 29.0 (A)   RDW 15.8 (A) 15.0 16.7 (A)   Platelets 194 240 201   Neutrophil Rel % 73.7 59.0 89.4 (A)   Immature Granulocyte Rel %  0.2 0.4   Lymphocyte Rel % 10.2 (A) 25.3 8.1 (A)   Monocyte Rel % 11.7 (A) 12.3 (A) 2.0 (A)   Eosinophil Rel % 2.9 2.3 0.0 (A)   Basophil Rel % 0.8 0.9 0.1   (A) Abnormal value       Comments are available for some flowsheets but are not being displayed.           Data reviewed: Radiologic studies Chest CT 5/11/2021, chest CT 12/30/2021 and Dr. Morin last office note   Procedures        Assessment and Plan    Diagnoses and all orders for this visit:    1. Pulmonary emphysema, unspecified emphysema type (HCC) (Primary)    2. Atrial fibrillation, unspecified type (HCC)    3. Dyspnea on exertion    4. Chronic congestive heart failure, unspecified heart failure type (HCC)    5. Dependence on supplemental oxygen    6. Pleural effusion    7. Primary hypertension    8.  Continue albuterol nebulizer treatments as needed.  9.  Continue supplemental oxygen to maintain saturations at or above 89%.  10.  Encouraged patient to try to stay as active as possible.  11.  Follow-up with cardiology as scheduled.  12.  Follow-up with primary care as scheduled.  13.  Follow-up in our office in 4 to 5 months, sooner if needed.    I spent 30 minutes caring for Arvind on this date of service. This time includes time spent by me in the following activities:preparing for the visit, reviewing tests, obtaining and/or reviewing a separately obtained history, performing a medically appropriate examination and/or evaluation , counseling and educating the patient/family/caregiver, referring and communicating with other health care professionals  and documenting information in the medical record    Follow Up   Return in about 4 months (around 5/13/2022) for Recheck with  MD.  Patient was given instructions and counseling regarding his condition or for health maintenance advice. Please see specific information pulled into the AVS if appropriate.

## 2022-01-25 ENCOUNTER — OFFICE VISIT (OUTPATIENT)
Dept: FAMILY MEDICINE CLINIC | Facility: CLINIC | Age: 87
End: 2022-01-25

## 2022-01-25 VITALS
OXYGEN SATURATION: 96 % | DIASTOLIC BLOOD PRESSURE: 70 MMHG | WEIGHT: 174 LBS | HEART RATE: 69 BPM | TEMPERATURE: 98.5 F | BODY MASS INDEX: 25.77 KG/M2 | HEIGHT: 69 IN | SYSTOLIC BLOOD PRESSURE: 130 MMHG

## 2022-01-25 DIAGNOSIS — R97.20 ELEVATED PSA, LESS THAN 10 NG/ML: ICD-10-CM

## 2022-01-25 DIAGNOSIS — R73.03 PREDIABETES: Primary | ICD-10-CM

## 2022-01-25 DIAGNOSIS — I48.91 ATRIAL FIBRILLATION, UNSPECIFIED TYPE: ICD-10-CM

## 2022-01-25 DIAGNOSIS — E78.2 MIXED HYPERLIPIDEMIA: ICD-10-CM

## 2022-01-25 DIAGNOSIS — I10 PRIMARY HYPERTENSION: ICD-10-CM

## 2022-01-25 DIAGNOSIS — Z99.81 DEPENDENCE ON SUPPLEMENTAL OXYGEN: ICD-10-CM

## 2022-01-25 PROBLEM — N18.31 STAGE 3A CHRONIC KIDNEY DISEASE (HCC): Status: ACTIVE | Noted: 2021-07-08

## 2022-01-25 LAB
ALBUMIN SERPL-MCNC: 3.9 G/DL (ref 3.5–5.2)
ALBUMIN/GLOB SERPL: 1.2 G/DL
ALP SERPL-CCNC: 105 U/L (ref 39–117)
ALT SERPL W P-5'-P-CCNC: 19 U/L (ref 1–41)
ANION GAP SERPL CALCULATED.3IONS-SCNC: 8.5 MMOL/L (ref 5–15)
AST SERPL-CCNC: 23 U/L (ref 1–40)
BASOPHILS # BLD AUTO: 0.05 10*3/MM3 (ref 0–0.2)
BASOPHILS NFR BLD AUTO: 0.8 % (ref 0–1.5)
BILIRUB SERPL-MCNC: 1.2 MG/DL (ref 0–1.2)
BUN SERPL-MCNC: 28 MG/DL (ref 8–23)
BUN/CREAT SERPL: 22 (ref 7–25)
CALCIUM SPEC-SCNC: 9.5 MG/DL (ref 8.6–10.5)
CHLORIDE SERPL-SCNC: 105 MMOL/L (ref 98–107)
CHOLEST SERPL-MCNC: 123 MG/DL (ref 0–200)
CO2 SERPL-SCNC: 28.5 MMOL/L (ref 22–29)
CREAT SERPL-MCNC: 1.27 MG/DL (ref 0.76–1.27)
DEPRECATED RDW RBC AUTO: 49 FL (ref 37–54)
EOSINOPHIL # BLD AUTO: 0.12 10*3/MM3 (ref 0–0.4)
EOSINOPHIL NFR BLD AUTO: 2 % (ref 0.3–6.2)
ERYTHROCYTE [DISTWIDTH] IN BLOOD BY AUTOMATED COUNT: 17.6 % (ref 12.3–15.4)
GFR SERPL CREATININE-BSD FRML MDRD: 54 ML/MIN/1.73
GLOBULIN UR ELPH-MCNC: 3.2 GM/DL
GLUCOSE SERPL-MCNC: 92 MG/DL (ref 65–99)
HBA1C MFR BLD: 7 % (ref 4.8–5.6)
HCT VFR BLD AUTO: 32.9 % (ref 37.5–51)
HDLC SERPL-MCNC: 45 MG/DL (ref 40–60)
HGB BLD-MCNC: 10 G/DL (ref 13–17.7)
LDLC SERPL CALC-MCNC: 66 MG/DL (ref 0–100)
LDLC/HDLC SERPL: 1.5 {RATIO}
LYMPHOCYTES # BLD AUTO: 1.19 10*3/MM3 (ref 0.7–3.1)
LYMPHOCYTES NFR BLD AUTO: 19.4 % (ref 19.6–45.3)
MCH RBC QN AUTO: 23.8 PG (ref 26.6–33)
MCHC RBC AUTO-ENTMCNC: 30.4 G/DL (ref 31.5–35.7)
MCV RBC AUTO: 78.3 FL (ref 79–97)
MONOCYTES # BLD AUTO: 1.01 10*3/MM3 (ref 0.1–0.9)
MONOCYTES NFR BLD AUTO: 16.5 % (ref 5–12)
NEUTROPHILS NFR BLD AUTO: 3.73 10*3/MM3 (ref 1.7–7)
NEUTROPHILS NFR BLD AUTO: 61 % (ref 42.7–76)
PLATELET # BLD AUTO: 167 10*3/MM3 (ref 140–450)
PMV BLD AUTO: 11.3 FL (ref 6–12)
POTASSIUM SERPL-SCNC: 4.5 MMOL/L (ref 3.5–5.2)
PROT SERPL-MCNC: 7.1 G/DL (ref 6–8.5)
RBC # BLD AUTO: 4.2 10*6/MM3 (ref 4.14–5.8)
SODIUM SERPL-SCNC: 142 MMOL/L (ref 136–145)
TRIGL SERPL-MCNC: 52 MG/DL (ref 0–150)
VLDLC SERPL-MCNC: 12 MG/DL (ref 5–40)
WBC NRBC COR # BLD: 6.12 10*3/MM3 (ref 3.4–10.8)

## 2022-01-25 PROCEDURE — 84466 ASSAY OF TRANSFERRIN: CPT | Performed by: NURSE PRACTITIONER

## 2022-01-25 PROCEDURE — 80061 LIPID PANEL: CPT | Performed by: NURSE PRACTITIONER

## 2022-01-25 PROCEDURE — 99214 OFFICE O/P EST MOD 30 MIN: CPT | Performed by: NURSE PRACTITIONER

## 2022-01-25 PROCEDURE — 83540 ASSAY OF IRON: CPT | Performed by: NURSE PRACTITIONER

## 2022-01-25 PROCEDURE — 85025 COMPLETE CBC W/AUTO DIFF WBC: CPT | Performed by: NURSE PRACTITIONER

## 2022-01-25 PROCEDURE — 83036 HEMOGLOBIN GLYCOSYLATED A1C: CPT | Performed by: NURSE PRACTITIONER

## 2022-01-25 PROCEDURE — 80053 COMPREHEN METABOLIC PANEL: CPT | Performed by: NURSE PRACTITIONER

## 2022-01-25 PROCEDURE — 36415 COLL VENOUS BLD VENIPUNCTURE: CPT | Performed by: NURSE PRACTITIONER

## 2022-01-25 NOTE — PROGRESS NOTES
"Chief Complaint  FU on Pulmonology apt, prediabetes, hypertension, hyperlipidemia    Subjective          Arvind Coates presents to [facility]  History of Present Illness  Presents today for follow-up on prediabetes, hypertension, hyperlipidemia, COPD, chronic kidney disease.  Overall patient states he is doing well.  He has some shortness of breath with exertion.  Uses DuoNeb as needed for shortness of breath.  Mild shortness of breath worse with exertion.  Denies wheezing and coughing.  He uses oxygen primarily at night as needed.    Hemoglobin A1c is 6.2.  He is working on following a diabetic diet lower in his sugars.  Denies polyuria and polydipsia.    Cholesterol is well controlled.  Currently taking Lipitor 40 mg daily.    Mildly elevated PSA.  Denies urinary symptoms.  Objective   Vital Signs:   /70 (BP Location: Left arm, Patient Position: Sitting, Cuff Size: Adult)   Pulse 69   Temp 98.5 °F (36.9 °C)   Ht 175.3 cm (69\")   Wt 78.9 kg (174 lb)   SpO2 96%   BMI 25.70 kg/m²     Physical Exam  Vitals reviewed.   Constitutional:       Appearance: Normal appearance. He is well-developed.   HENT:      Head: Normocephalic and atraumatic.      Right Ear: External ear normal.      Left Ear: External ear normal.      Mouth/Throat:      Pharynx: No oropharyngeal exudate.   Eyes:      Conjunctiva/sclera: Conjunctivae normal.      Pupils: Pupils are equal, round, and reactive to light.   Cardiovascular:      Rate and Rhythm: Normal rate. Rhythm irregular.      Heart sounds: No murmur heard.  No friction rub. No gallop.    Pulmonary:      Effort: Pulmonary effort is normal.      Breath sounds: Decreased breath sounds present. No wheezing or rhonchi.   Abdominal:      General: Bowel sounds are normal. There is no distension.      Palpations: Abdomen is soft.      Tenderness: There is no abdominal tenderness.   Skin:     General: Skin is warm and dry.   Neurological:      Mental " Status: He is alert and oriented to person, place, and time.      Cranial Nerves: No cranial nerve deficit.   Psychiatric:         Mood and Affect: Mood and affect normal.         Behavior: Behavior normal.         Thought Content: Thought content normal.         Judgment: Judgment normal.        Result Review :     Common labs    Common Labsle 4/4/21 7/7/21 7/7/21 7/7/21 7/7/21 7/7/21 10/8/21 10/8/21 10/8/21 10/8/21     1502 1502 1502 1502 1502 1604 1604 1604 1604   Glucose 107 (A)     86   150 (A)    BUN 48 (A)     21   36 (A)    Creatinine 1.99 (A)     1.50 (A)   1.12    eGFR Non  Am      44 (A)   62    Sodium 139     141   142    Potassium 4.6     4.1   4.9    Chloride 108     106   106    Calcium 9.3     9.1   9.4    Albumin      4.30   4.60    Total Bilirubin      1.0   0.9    Alkaline Phosphatase      70   74    AST (SGOT)      22   27    ALT (SGPT)      11   38    WBC  6.65     7.50      Hemoglobin  10.1 (A)     9.9 (A)      Hematocrit  33.5 (A)     34.1 (A)      Platelets  240     201      Total Cholesterol    117         Triglycerides    79         HDL Cholesterol    40         LDL Cholesterol     61         Hemoglobin A1C   6.20 (A)     6.28 (A)     PSA     5.970 (A)     5.940 (A)   (A) Abnormal value                      Assessment and Plan    Diagnoses and all orders for this visit:    1. Prediabetes (Primary)  -     Hemoglobin A1c    2. Primary hypertension  -     CBC & Differential  -     Comprehensive Metabolic Panel    3. Mixed hyperlipidemia  -     Lipid Panel    4. Dependence on supplemental oxygen    5. Elevated PSA, less than 10 ng/ml    6. Atrial fibrillation, unspecified type (HCC)    Continue eating a low-carb and low-cholesterol diet.  Blood pressure is well controlled.  Use DuoNeb as needed for shortness of breath.      Follow Up   No follow-ups on file.  Patient was given instructions and counseling regarding his condition or for health maintenance advice. Please see specific  information pulled into the AVS if appropriate.

## 2022-01-26 DIAGNOSIS — D64.9 ANEMIA, UNSPECIFIED TYPE: Primary | ICD-10-CM

## 2022-01-26 DIAGNOSIS — E11.65 TYPE 2 DIABETES MELLITUS WITH HYPERGLYCEMIA, WITHOUT LONG-TERM CURRENT USE OF INSULIN: ICD-10-CM

## 2022-01-26 LAB
IRON 24H UR-MRATE: 34 MCG/DL (ref 59–158)
IRON SATN MFR SERPL: 7 % (ref 20–50)
TIBC SERPL-MCNC: 501 MCG/DL (ref 298–536)
TRANSFERRIN SERPL-MCNC: 336 MG/DL (ref 200–360)

## 2022-01-26 RX ORDER — METFORMIN HYDROCHLORIDE 500 MG/1
500 TABLET, EXTENDED RELEASE ORAL
Qty: 90 TABLET | Refills: 1 | Status: SHIPPED | OUTPATIENT
Start: 2022-01-26 | End: 2022-08-08 | Stop reason: SDUPTHER

## 2022-02-01 DIAGNOSIS — E61.1 IRON DEFICIENCY: Primary | ICD-10-CM

## 2022-02-01 RX ORDER — FERROUS SULFATE 325(65) MG
325 TABLET ORAL
Qty: 30 TABLET | Refills: 2 | Status: SHIPPED | OUTPATIENT
Start: 2022-02-01 | End: 2022-05-19

## 2022-04-06 ENCOUNTER — OFFICE VISIT (OUTPATIENT)
Dept: FAMILY MEDICINE CLINIC | Facility: CLINIC | Age: 87
End: 2022-04-06

## 2022-04-06 VITALS
HEART RATE: 68 BPM | OXYGEN SATURATION: 98 % | TEMPERATURE: 97 F | SYSTOLIC BLOOD PRESSURE: 142 MMHG | DIASTOLIC BLOOD PRESSURE: 80 MMHG | HEIGHT: 69 IN | WEIGHT: 165.6 LBS | BODY MASS INDEX: 24.53 KG/M2

## 2022-04-06 DIAGNOSIS — E11.65 TYPE 2 DIABETES MELLITUS WITH HYPERGLYCEMIA, WITHOUT LONG-TERM CURRENT USE OF INSULIN: Primary | ICD-10-CM

## 2022-04-06 DIAGNOSIS — D50.9 IRON DEFICIENCY ANEMIA, UNSPECIFIED IRON DEFICIENCY ANEMIA TYPE: ICD-10-CM

## 2022-04-06 PROBLEM — R73.03 PREDIABETES: Status: RESOLVED | Noted: 2021-10-08 | Resolved: 2022-04-06

## 2022-04-06 LAB
FERRITIN SERPL-MCNC: 26.9 NG/ML (ref 30–400)
HBA1C MFR BLD: 6.1 % (ref 4.8–5.6)

## 2022-04-06 PROCEDURE — 84466 ASSAY OF TRANSFERRIN: CPT | Performed by: NURSE PRACTITIONER

## 2022-04-06 PROCEDURE — 99214 OFFICE O/P EST MOD 30 MIN: CPT | Performed by: NURSE PRACTITIONER

## 2022-04-06 PROCEDURE — 83540 ASSAY OF IRON: CPT | Performed by: NURSE PRACTITIONER

## 2022-04-06 PROCEDURE — 82728 ASSAY OF FERRITIN: CPT | Performed by: NURSE PRACTITIONER

## 2022-04-06 PROCEDURE — 83036 HEMOGLOBIN GLYCOSYLATED A1C: CPT | Performed by: NURSE PRACTITIONER

## 2022-04-06 NOTE — PROGRESS NOTES
"Chief Complaint  Diabetes    Subjective          Arvind Coates presents to [facility]  History of Present Illness  Presents today for 3-month follow-up on type 2 diabetes.  He was diagnosed with type 2 diabetes 3 months ago.  He was started on Metformin 500 mg once daily.  Patient does not check his blood sugars at home.  Denies polyuria polydipsia.  Patient states he is feeling better.    He has history of anemia.  Is known to be iron deficient.  When prescribing an iron supplement patient states that since the medication can cause constipation that he does not want to take the medication that he already has problems with constipation.    Objective   Vital Signs:   /80   Pulse 68   Temp 97 °F (36.1 °C)   Ht 175.3 cm (69\")   Wt 75.1 kg (165 lb 9.6 oz)   SpO2 98%   BMI 24.45 kg/m²     BMI is within normal parameters. No follow-up required.      Physical Exam  Vitals reviewed.   Constitutional:       Appearance: Normal appearance. He is well-developed.   HENT:      Head: Normocephalic and atraumatic.      Right Ear: External ear normal.      Left Ear: External ear normal.      Mouth/Throat:      Pharynx: No oropharyngeal exudate.   Eyes:      Conjunctiva/sclera: Conjunctivae normal.      Pupils: Pupils are equal, round, and reactive to light.   Cardiovascular:      Rate and Rhythm: Normal rate and regular rhythm.      Heart sounds: No murmur heard.    No friction rub. No gallop.   Pulmonary:      Effort: Pulmonary effort is normal.      Breath sounds: Normal breath sounds. No wheezing or rhonchi.   Abdominal:      General: Bowel sounds are normal. There is no distension.      Palpations: Abdomen is soft.      Tenderness: There is no abdominal tenderness.   Skin:     General: Skin is warm and dry.   Neurological:      Mental Status: He is alert and oriented to person, place, and time.   Psychiatric:         Mood and Affect: Mood and affect normal.         Behavior: " Behavior normal.         Thought Content: Thought content normal.         Judgment: Judgment normal.        Result Review :     Common labs    Common Labsle 7/7/21 7/7/21 7/7/21 7/7/21 7/7/21 10/8/21 10/8/21 10/8/21 10/8/21 1/25/22 1/25/22 1/25/22 1/25/22    1502 1502 1502 1502 1502 1604 1604 1604 1604 1519 1519 1519 1519   Glucose     86   150 (A)  92      BUN     21   36 (A)  28 (A)      Creatinine     1.50 (A)   1.12  1.27      eGFR Non  Am     44 (A)   62  54 (A)      Sodium     141   142  142      Potassium     4.1   4.9  4.5      Chloride     106   106  105      Calcium     9.1   9.4  9.5      Albumin     4.30   4.60  3.90      Total Bilirubin     1.0   0.9  1.2      Alkaline Phosphatase     70   74  105      AST (SGOT)     22   27  23      ALT (SGPT)     11   38  19      WBC 6.65     7.50      6.12    Hemoglobin 10.1 (A)     9.9 (A)      10.0 (A)    Hematocrit 33.5 (A)     34.1 (A)      32.9 (A)    Platelets 240     201      167    Total Cholesterol   117        123     Triglycerides   79        52     HDL Cholesterol   40        45     LDL Cholesterol    61        66     Hemoglobin A1C  6.20 (A)     6.28 (A)      7.00 (A)   PSA    5.970 (A)     5.940 (A)       (A) Abnormal value                      Assessment and Plan    Diagnoses and all orders for this visit:    1. Type 2 diabetes mellitus with hyperglycemia, without long-term current use of insulin (HCC) (Primary)  -     Hemoglobin A1c    2. Iron deficiency anemia, unspecified iron deficiency anemia type  -     Ambulatory Referral to Hematology / Oncology    Check hemoglobin A1c today.  If hemoglobin A1c remains elevated will increase Metformin to 500 mg twice daily.  Consult hematology for further evaluation of his anemia and iron deficiency.      Follow Up   Return in about 3 months (around 7/6/2022), or if symptoms worsen or fail to improve, for Next scheduled follow up.  Patient was given instructions and counseling regarding his condition or  for health maintenance advice. Please see specific information pulled into the AVS if appropriate.

## 2022-04-07 DIAGNOSIS — D50.9 IRON DEFICIENCY ANEMIA, UNSPECIFIED IRON DEFICIENCY ANEMIA TYPE: Primary | ICD-10-CM

## 2022-04-07 LAB
IRON 24H UR-MRATE: 35 MCG/DL (ref 59–158)
IRON SATN MFR SERPL: 8 % (ref 20–50)
TIBC SERPL-MCNC: 462 MCG/DL (ref 298–536)
TRANSFERRIN SERPL-MCNC: 310 MG/DL (ref 200–360)

## 2022-04-21 ENCOUNTER — LAB (OUTPATIENT)
Dept: ONCOLOGY | Facility: HOSPITAL | Age: 87
End: 2022-04-21

## 2022-04-21 ENCOUNTER — CONSULT (OUTPATIENT)
Dept: ONCOLOGY | Facility: HOSPITAL | Age: 87
End: 2022-04-21

## 2022-04-21 VITALS
HEART RATE: 64 BPM | WEIGHT: 166.01 LBS | OXYGEN SATURATION: 98 % | BODY MASS INDEX: 24.51 KG/M2 | RESPIRATION RATE: 18 BRPM | SYSTOLIC BLOOD PRESSURE: 140 MMHG | DIASTOLIC BLOOD PRESSURE: 87 MMHG | TEMPERATURE: 97.5 F

## 2022-04-21 DIAGNOSIS — D50.9 IRON DEFICIENCY ANEMIA, UNSPECIFIED IRON DEFICIENCY ANEMIA TYPE: Primary | ICD-10-CM

## 2022-04-21 DIAGNOSIS — N18.9 CHRONIC KIDNEY DISEASE, UNSPECIFIED CKD STAGE: ICD-10-CM

## 2022-04-21 DIAGNOSIS — D50.9 IRON DEFICIENCY ANEMIA, UNSPECIFIED IRON DEFICIENCY ANEMIA TYPE: ICD-10-CM

## 2022-04-21 PROBLEM — D64.89 OTHER SPECIFIED ANEMIAS: Status: ACTIVE | Noted: 2022-04-21

## 2022-04-21 PROBLEM — K90.49 MALABSORPTION DUE TO INTOLERANCE, NOT ELSEWHERE CLASSIFIED: Status: ACTIVE | Noted: 2022-04-21

## 2022-04-21 LAB
ACANTHOCYTES BLD QL SMEAR: ABNORMAL
ANISOCYTOSIS BLD QL: ABNORMAL
BASOPHILS # BLD MANUAL: 0.17 10*3/MM3 (ref 0–0.2)
BASOPHILS NFR BLD MANUAL: 3 % (ref 0–1.5)
BURR CELLS BLD QL SMEAR: ABNORMAL
C3 FRG RBC-MCNC: ABNORMAL
DACRYOCYTES BLD QL SMEAR: ABNORMAL
DEPRECATED RDW RBC AUTO: 64.2 FL (ref 37–54)
EOSINOPHIL # BLD MANUAL: 0.17 10*3/MM3 (ref 0–0.4)
EOSINOPHIL NFR BLD MANUAL: 3 % (ref 0.3–6.2)
ERYTHROCYTE [DISTWIDTH] IN BLOOD BY AUTOMATED COUNT: 21.3 % (ref 12.3–15.4)
FOLATE SERPL-MCNC: 15.4 NG/ML (ref 4.78–24.2)
HCT VFR BLD AUTO: 31.3 % (ref 37.5–51)
HGB BLD-MCNC: 9.6 G/DL (ref 13–17.7)
HYPOCHROMIA BLD QL: ABNORMAL
IGA1 MFR SER: 341 MG/DL (ref 70–400)
IGG1 SER-MCNC: 1003 MG/DL (ref 700–1600)
IGM SERPL-MCNC: 71 MG/DL (ref 40–230)
LYMPHOCYTES # BLD MANUAL: 0.62 10*3/MM3 (ref 0.7–3.1)
LYMPHOCYTES NFR BLD MANUAL: 4 % (ref 5–12)
MCH RBC QN AUTO: 25.7 PG (ref 26.6–33)
MCHC RBC AUTO-ENTMCNC: 30.7 G/DL (ref 31.5–35.7)
MCV RBC AUTO: 83.7 FL (ref 79–97)
MICROCYTES BLD QL: ABNORMAL
MONOCYTES # BLD: 0.23 10*3/MM3 (ref 0.1–0.9)
NEUTROPHILS # BLD AUTO: 4.46 10*3/MM3 (ref 1.7–7)
NEUTROPHILS NFR BLD MANUAL: 79 % (ref 42.7–76)
OVALOCYTES BLD QL SMEAR: ABNORMAL
PATHOLOGY REVIEW: YES
PLATELET # BLD AUTO: 175 10*3/MM3 (ref 140–450)
PMV BLD AUTO: 11.3 FL (ref 6–12)
POIKILOCYTOSIS BLD QL SMEAR: ABNORMAL
RBC # BLD AUTO: 3.74 10*6/MM3 (ref 4.14–5.8)
RETICS # AUTO: 0.06 10*6/MM3 (ref 0.02–0.13)
RETICS/RBC NFR AUTO: 1.58 % (ref 0.7–1.9)
SCHISTOCYTES BLD QL SMEAR: ABNORMAL
SMALL PLATELETS BLD QL SMEAR: ADEQUATE
TARGETS BLD QL SMEAR: ABNORMAL
VARIANT LYMPHS NFR BLD MANUAL: 11 % (ref 19.6–45.3)
VIT B12 BLD-MCNC: 615 PG/ML (ref 211–946)
WBC MORPH BLD: NORMAL
WBC NRBC COR # BLD: 5.64 10*3/MM3 (ref 3.4–10.8)

## 2022-04-21 PROCEDURE — 85025 COMPLETE CBC W/AUTO DIFF WBC: CPT | Performed by: NURSE PRACTITIONER

## 2022-04-21 PROCEDURE — 82746 ASSAY OF FOLIC ACID SERUM: CPT

## 2022-04-21 PROCEDURE — 84165 PROTEIN E-PHORESIS SERUM: CPT

## 2022-04-21 PROCEDURE — G0463 HOSPITAL OUTPT CLINIC VISIT: HCPCS | Performed by: NURSE PRACTITIONER

## 2022-04-21 PROCEDURE — 85007 BL SMEAR W/DIFF WBC COUNT: CPT | Performed by: NURSE PRACTITIONER

## 2022-04-21 PROCEDURE — 36415 COLL VENOUS BLD VENIPUNCTURE: CPT

## 2022-04-21 PROCEDURE — 83521 IG LIGHT CHAINS FREE EACH: CPT

## 2022-04-21 PROCEDURE — 82607 VITAMIN B-12: CPT

## 2022-04-21 PROCEDURE — 99214 OFFICE O/P EST MOD 30 MIN: CPT | Performed by: NURSE PRACTITIONER

## 2022-04-21 PROCEDURE — 85045 AUTOMATED RETICULOCYTE COUNT: CPT | Performed by: NURSE PRACTITIONER

## 2022-04-21 PROCEDURE — 82784 ASSAY IGA/IGD/IGG/IGM EACH: CPT

## 2022-04-21 PROCEDURE — 84155 ASSAY OF PROTEIN SERUM: CPT

## 2022-04-21 NOTE — PROGRESS NOTES
Chief Complaint  Iron Deficiency Anemia    Paco Daniel APRN Lowder, Bradley, APRN    Records Obtained:  Records of the patients history including those obtained from  [facility]   were reviewed and summarized in detail.    Reason for referral: iron deficiency anemia    Subjective          Arvind Coates presents to [facility] for iron deficiency anemia.    History of Present Illness   Mr. Arvind Coates is a very pleasant 86 year  male preesenting with his wife for new patient evaluation for iron deficiency anemia.     He reports his last GI evaluation was in [place] off of [facility] around 10-12 years ago and did have a very large polyp at that time. He has not had one since then. He has 2 brother's with colon cancer: 1 brother currently undergoing treatment now and the other brother passed away with colon cancer. Mother  of old age and father  of MI.     Recent lab work: Iron low at 35, ferritin low at 26, iron sat low at 8% on 22. Hemoglobin 10.0 on 22. WBC 6.12, MCV 78, platelet count 167,000. He has had chronic anemia steadily worsening over the last one year. Hemoglobin of 13.3 a year ago. He also reports he had COVID this time last year and still has not gained his sense of taste back yet, therefore he has lost weight due to not eating. He has lost about 16 pounds over the course of a year. Also, has cardiomyopathy, pulmonary fibrosis, emphysema noted on prior CT. He does have an elevated PSA level x 2 in the upper range of almost 6. He has not had any prostrate biopsy.     It appears he was sent oral iron to take but has never started due to constipation and GI upset in the past.         Oncology/Hematology History    No history exists.       Review of Systems   Constitutional: Positive for fatigue. Negative for appetite change, diaphoresis, fever, unexpected weight gain and unexpected weight loss.   HENT: Negative for hearing loss, sore  throat and voice change.    Eyes: Negative for blurred vision, double vision, pain, redness and visual disturbance.   Respiratory: Negative for cough, shortness of breath and wheezing.    Cardiovascular: Negative for chest pain, palpitations and leg swelling.   Gastrointestinal: Positive for constipation.   Endocrine: Negative for cold intolerance, heat intolerance, polydipsia and polyuria.   Genitourinary: Negative for decreased urine volume, difficulty urinating, frequency and urinary incontinence.   Musculoskeletal: Negative for arthralgias, back pain, joint swelling and myalgias.   Skin: Negative for color change, rash, skin lesions and wound.   Neurological: Negative for dizziness, seizures, numbness and headache.   Hematological: Negative for adenopathy. Does not bruise/bleed easily.   Psychiatric/Behavioral: Negative for depressed mood. The patient is not nervous/anxious.    All other systems reviewed and are negative.      Current Outpatient Medications on File Prior to Visit   Medication Sig Dispense Refill   • albuterol sulfate  (90 Base) MCG/ACT inhaler      • aspirin 81 MG chewable tablet aspirin 81 mg oral tablet,chewable chew 1 tablet (81 mg) by oral route once daily   Active     • atorvastatin (LIPITOR) 40 MG tablet atorvastatin 40 mg oral tablet take 1 tablet (40 mg) by oral route once daily at bedtime   Active     • benzonatate (TESSALON) 100 MG capsule Take 100 mg by mouth 3 (Three) Times a Day.     • cetirizine (zyrTEC) 10 MG tablet Take 10 mg by mouth Daily.     • ferrous sulfate 325 (65 FE) MG tablet Take 1 tablet by mouth Daily With Breakfast. 30 tablet 2   • fluticasone (FLONASE) 50 MCG/ACT nasal spray SPRAY 2 SPRAYS IN EACH NOSTRIL BY INTRANASAL ROUTE ONCE DAILY AS NEEDED     • Ipratropium-Albuterol (ALBUTEROL-IPRATROPIUM IN) Duo Neb 3 ml inhalation Q 4 hours as needed   Active     • lisinopril (PRINIVIL,ZESTRIL) 10 MG tablet lisinopril 10 mg oral tablet take 1 tablet (10 mg) by oral  route once daily   Active     • metFORMIN ER (GLUCOPHAGE-XR) 500 MG 24 hr tablet Take 1 tablet by mouth Daily With Breakfast. 90 tablet 1   • metoprolol succinate XL (TOPROL-XL) 25 MG 24 hr tablet metoprolol succinate 25 mg oral tablet extended release 24 hr take 1 tablet (25 mg) by oral route once daily for 30 days   Suspended     • metoprolol succinate XL (TOPROL-XL) 50 MG 24 hr tablet Take 75 mg by mouth Daily.     • O2 (OXYGEN) Inhale 2 L/min 1 (One) Time.     • warfarin (COUMADIN) 2 MG tablet warfarin 2 mg oral tablet take 1 tablet (2 mg) by oral route once daily   Active       No current facility-administered medications on file prior to visit.       No Known Allergies  Past Medical History:   Diagnosis Date   • Broken bones    • CHF (congestive heart failure) (HCC)    • COVID-19    • Hemorrhoids    • Hyperlipidemia    • Hypertension    • Pneumonia    • Shortness of breath      Past Surgical History:   Procedure Laterality Date   • APPENDECTOMY     • COLONOSCOPY      2012?- normal per patient   • OTHER SURGICAL HISTORY      Triple Bi-Pass     Social History     Socioeconomic History   • Marital status: Single   Tobacco Use   • Smoking status: Never Smoker   • Smokeless tobacco: Never Used   Vaping Use   • Vaping Use: Never used   Substance and Sexual Activity   • Alcohol use: Never   • Drug use: Defer   • Sexual activity: Defer     Family History   Problem Relation Age of Onset   • Colon cancer Brother    • Prostate cancer Brother    • Diabetes Other      Immunization History   Administered Date(s) Administered   • COVID-19 (MODERNA) 1st, 2nd, 3rd Dose Only 03/10/2021   • Fluzone High-Dose 65+yrs 10/08/2021       Objective   Physical Exam  Vitals and nursing note reviewed.   Constitutional:       Appearance: Normal appearance. He is normal weight.   HENT:      Head: Normocephalic.      Nose: Nose normal.      Mouth/Throat:      Mouth: Mucous membranes are moist.   Eyes:      Pupils: Pupils are equal, round,  and reactive to light.   Cardiovascular:      Rate and Rhythm: Normal rate and regular rhythm.      Pulses: Normal pulses.      Heart sounds: Murmur heard.   Pulmonary:      Effort: Pulmonary effort is normal.      Breath sounds: Normal breath sounds.   Abdominal:      General: Bowel sounds are normal.      Palpations: Abdomen is soft.   Musculoskeletal:         General: Normal range of motion.      Cervical back: Normal range of motion and neck supple.   Skin:     General: Skin is warm and dry.      Capillary Refill: Capillary refill takes less than 2 seconds.   Neurological:      General: No focal deficit present.      Mental Status: He is alert and oriented to person, place, and time.   Psychiatric:         Mood and Affect: Mood normal.         Behavior: Behavior normal.         Thought Content: Thought content normal.         Judgment: Judgment normal.         Vitals:    04/21/22 1356   BP: 140/87   Pulse: 64   Resp: 18   Temp: 97.5 °F (36.4 °C)   SpO2: 98%   Weight: 75.3 kg (166 lb 0.1 oz)   PainSc: 0-No pain     ECOG score: 0         ECOG: (0) Fully Active - Able to Carry On All Pre-disease Performance Without Restriction  Fall Risk Assessment was completed, and patient is at low risk for falls.  PHQ-9 Total Score: 0       The patient is  experiencing fatigue. Fatigue score: 7    PT/OT Functional Screening: PT fx screen: No needs identified  Speech Functional Screening: Speech fx screen: No needs identified  Rehab to be ordered: Rehab to be ordered: No needs identified        Result Review :   The following data was reviewed by: OLIVIA Andrade on 04/21/2022:  Lab Results   Component Value Date    HGB 10.0 (L) 01/25/2022    HCT 32.9 (L) 01/25/2022    MCV 78.3 (L) 01/25/2022     01/25/2022    WBC 6.12 01/25/2022    NEUTROABS 3.73 01/25/2022    LYMPHSABS 1.19 01/25/2022    MONOSABS 1.01 (H) 01/25/2022    EOSABS 0.12 01/25/2022    BASOSABS 0.05 01/25/2022     Lab Results   Component Value Date     GLUCOSE 92 01/25/2022    BUN 28 (H) 01/25/2022    CREATININE 1.27 01/25/2022     01/25/2022    K 4.5 01/25/2022     01/25/2022    CO2 28.5 01/25/2022    CALCIUM 9.5 01/25/2022    PROTEINTOT 7.1 01/25/2022    ALBUMIN 3.90 01/25/2022    BILITOT 1.2 01/25/2022    ALKPHOS 105 01/25/2022    AST 23 01/25/2022    ALT 19 01/25/2022          Assessment and Plan    Diagnoses and all orders for this visit:    1. Iron deficiency anemia, unspecified iron deficiency anemia type (Primary)  -     CBC & Differential; Future  -     Folate; Future  -     ROBERTO+Protein Electro, 24-Hr Urine - Urine, Clean Catch; Future  -     Immunoglobulin Free LT Chains Blood; Future  -     IgG; Future  -     IgM; Future  -     IgA; Future  -     Peripheral Blood Smear; Future  -     Protein Elec + Interp, Serum; Future  -     Vitamin B12; Future  -     Reticulocytes; Future  -     Occult Blood, Fecal By Immunoassay - Stool, Per Rectum; Future    2. Chronic kidney disease, unspecified CKD stage    Differential diagnosis includes: iron deficiency anemia secondary to nutritional deficiency from loss of appetite. I would recommend he have a GI evaluation as well given his history of large polyp in the past with his last colonoscopy 10 years ago and is familial history of colon cancer in 2 brothers. There may also be a degree of anemia associated with chronic disease given his underlying history of cardiomyopathy, type II diabetes, and CKD.     We will order a Injectafer infusions and PA with insurance. Also check for any MGUS with urine and serum and check for any folate or B-12 deficiency. Will also check stool for occult blood and if positive he would need further GI workup.     He will follow up in 4 weeks with oncology for review of his labs.     I spent 30 minutes caring for Arvind on this date of service. This time includes time spent by me in the following activities:preparing for the visit, reviewing tests, obtaining and/or reviewing  a separately obtained history, performing a medically appropriate examination and/or evaluation , counseling and educating the patient/family/caregiver, ordering medications, tests, or procedures, referring and communicating with other health care professionals , documenting information in the medical record, independently interpreting results and communicating that information with the patient/family/caregiver and care coordination    Patient Follow Up: 4 weeks with oncology or locums.     Patient was given instructions and counseling regarding his condition or for health maintenance advice. Please see specific information pulled into the AVS if appropriate.     Jessica Webster, APRN    4/21/2022

## 2022-04-22 LAB
ALBUMIN SERPL ELPH-MCNC: 3.9 G/DL (ref 2.9–4.4)
ALBUMIN/GLOB SERPL: 1.4 {RATIO} (ref 0.7–1.7)
ALPHA1 GLOB SERPL ELPH-MCNC: 0.3 G/DL (ref 0–0.4)
ALPHA2 GLOB SERPL ELPH-MCNC: 0.6 G/DL (ref 0.4–1)
B-GLOBULIN SERPL ELPH-MCNC: 1.1 G/DL (ref 0.7–1.3)
GAMMA GLOB SERPL ELPH-MCNC: 0.8 G/DL (ref 0.4–1.8)
GLOBULIN SER CALC-MCNC: 2.8 G/DL (ref 2.2–3.9)
KAPPA LC FREE SER-MCNC: 130.3 MG/L (ref 3.3–19.4)
KAPPA LC FREE/LAMBDA FREE SER: 5.32 {RATIO} (ref 0.26–1.65)
LABORATORY COMMENT REPORT: NORMAL
LAMBDA LC FREE SERPL-MCNC: 24.5 MG/L (ref 5.7–26.3)
M PROTEIN SERPL ELPH-MCNC: NORMAL G/DL
PROT PATTERN SERPL ELPH-IMP: NORMAL
PROT SERPL-MCNC: 6.7 G/DL (ref 6–8.5)

## 2022-04-25 ENCOUNTER — LAB (OUTPATIENT)
Dept: LAB | Facility: HOSPITAL | Age: 87
End: 2022-04-25

## 2022-04-25 ENCOUNTER — TELEPHONE (OUTPATIENT)
Dept: ONCOLOGY | Facility: HOSPITAL | Age: 87
End: 2022-04-25

## 2022-04-25 DIAGNOSIS — D50.9 IRON DEFICIENCY ANEMIA, UNSPECIFIED IRON DEFICIENCY ANEMIA TYPE: ICD-10-CM

## 2022-04-25 DIAGNOSIS — D64.89 ANEMIA DUE TO OTHER CAUSE, NOT CLASSIFIED: Primary | ICD-10-CM

## 2022-04-25 LAB
CYTO UR: NORMAL
HEMOCCULT STL QL IA: POSITIVE
LAB AP CASE REPORT: NORMAL
LAB AP CLINICAL INFORMATION: NORMAL
PATH REPORT.FINAL DX SPEC: NORMAL
PATH REPORT.GROSS SPEC: NORMAL

## 2022-04-25 PROCEDURE — 82274 ASSAY TEST FOR BLOOD FECAL: CPT

## 2022-04-25 PROCEDURE — 81050 URINALYSIS VOLUME MEASURE: CPT

## 2022-04-25 PROCEDURE — 84166 PROTEIN E-PHORESIS/URINE/CSF: CPT

## 2022-04-25 PROCEDURE — 86335 IMMUNFIX E-PHORSIS/URINE/CSF: CPT

## 2022-04-25 PROCEDURE — 84156 ASSAY OF PROTEIN URINE: CPT

## 2022-04-25 NOTE — TELEPHONE ENCOUNTER
Caller: ORLANDO VALVERDE    Relationship: Emergency Contact    Best call back number: [phone]    What is the best time to reach you: ANYTIME    Who are you requesting to speak with (clinical staff, provider,  specific staff member): DR MURCIA OR NURSE    What was the call regarding: ORLANDO CALLING TO SEE IF PTS LAB RESULTS FROM 4/21 WERE AVAILABLE YET - WOULD LIKE SOMEONE TO CALL HER TO DISCUSS THEM.    Do you require a callback: YES

## 2022-04-26 NOTE — TELEPHONE ENCOUNTER
Caller: ORLANDO VALVERDE    Relationship to patient: Emergency Contact    Best call back number: [phone]    Patient is needing: LAB RESULTS FROM 4-. ORLANDO IS REACHING OUT BECAUSE SHE HAS NOT RECEIVED A PHONE CALL YET.

## 2022-04-27 LAB
ALBUMIN 24H MFR UR ELPH: 45.8 %
ALPHA1 GLOB 24H MFR UR ELPH: 1.2 %
ALPHA2 GLOB 24H MFR UR ELPH: 11 %
B-GLOBULIN MFR UR ELPH: 30.9 %
GAMMA GLOB 24H MFR UR ELPH: 11.1 %
HIV 1 & 2 AB SER-IMP: ABNORMAL
INTERPRETATION UR IFE-IMP: ABNORMAL
M PROTEIN 24H MFR UR ELPH: 23.3 %
M PROTEIN 24H UR ELPH-MRATE: 48 MG/24 HR
PROT 24H UR-MRATE: 206 MG/24 HR (ref 30–150)
PROT UR-MCNC: 26.6 MG/DL

## 2022-04-28 ENCOUNTER — TELEPHONE (OUTPATIENT)
Dept: ONCOLOGY | Facility: HOSPITAL | Age: 87
End: 2022-04-28

## 2022-04-28 DIAGNOSIS — R63.4 WEIGHT LOSS: ICD-10-CM

## 2022-04-28 DIAGNOSIS — R19.5 POSITIVE FECAL OCCULT BLOOD TEST: ICD-10-CM

## 2022-04-28 DIAGNOSIS — D50.9 IRON DEFICIENCY ANEMIA, UNSPECIFIED IRON DEFICIENCY ANEMIA TYPE: Primary | ICD-10-CM

## 2022-04-28 NOTE — PROGRESS NOTES
Can you call him and tell him he has blood in his stool and see if he wants a local referral to someone here in [facility] with GI or if wants to try to follow up with whomever he saw 10-12 years ago.

## 2022-04-28 NOTE — TELEPHONE ENCOUNTER
Caller: ORLANDO VALVERDE    Relationship: Emergency Contact        What was the call regarding:   HAS CALLED A COUPLE TIMES , TRYING TO HAVE SOMEONE GO  OVER TEST RESULTS FOR FATHER 04/21 HAS NOT HEARD BACK YET.     I CALLED CLINICAL LINE AND SPOKE WITH TALIB AND SHE ADVISED SHE WOULD GET WITH DARNELL NURSE WORKING WITH DR MURCIA TODAY AND TRY TO HAVE HER CALL ORLANDO BACK TODAY AND AT THE LATEST TOMORROW.    I ADVISED THIS TO ORLANDO AND SHE V/U .

## 2022-04-28 NOTE — TELEPHONE ENCOUNTER
Spoke with Kari Pineda patients daughter. Informed her that her fathers stool specimen had some blood in it and that she has referred him to Gastroenterology and that someone will call with that appt. Daughter voiced understanding.

## 2022-04-28 NOTE — TELEPHONE ENCOUNTER
----- Message from OLIVIA Gambino sent at 4/28/2022 11:55 AM EDT -----  Can you call him and tell him he has blood in his stool and see if he wants a local referral to someone here in [place] with GI or if wants to try to follow up with whomever he saw 10-12 years ago.

## 2022-04-29 RX ORDER — SODIUM CHLORIDE 9 MG/ML
250 INJECTION, SOLUTION INTRAVENOUS ONCE
Status: CANCELLED | OUTPATIENT
Start: 2022-05-02

## 2022-04-29 RX ORDER — SODIUM CHLORIDE 9 MG/ML
250 INJECTION, SOLUTION INTRAVENOUS ONCE
Status: CANCELLED | OUTPATIENT
Start: 2022-05-09

## 2022-05-02 ENCOUNTER — HOSPITAL ENCOUNTER (OUTPATIENT)
Dept: ONCOLOGY | Facility: HOSPITAL | Age: 87
Setting detail: INFUSION SERIES
Discharge: HOME | End: 2022-05-02

## 2022-05-02 VITALS
TEMPERATURE: 98 F | HEART RATE: 69 BPM | RESPIRATION RATE: 20 BRPM | SYSTOLIC BLOOD PRESSURE: 142 MMHG | OXYGEN SATURATION: 96 % | DIASTOLIC BLOOD PRESSURE: 69 MMHG

## 2022-05-02 DIAGNOSIS — K90.49 MALABSORPTION DUE TO INTOLERANCE, NOT ELSEWHERE CLASSIFIED: ICD-10-CM

## 2022-05-02 DIAGNOSIS — D64.89 ANEMIA DUE TO OTHER CAUSE, NOT CLASSIFIED: Primary | ICD-10-CM

## 2022-05-02 PROCEDURE — 25010000002 FERRIC CARBOXYMALTOSE 750 MG/15ML SOLUTION: Performed by: NURSE PRACTITIONER

## 2022-05-02 PROCEDURE — 96374 THER/PROPH/DIAG INJ IV PUSH: CPT

## 2022-05-02 PROCEDURE — 96409 CHEMO IV PUSH SNGL DRUG: CPT

## 2022-05-02 RX ORDER — SODIUM CHLORIDE 9 MG/ML
250 INJECTION, SOLUTION INTRAVENOUS ONCE
Status: COMPLETED | OUTPATIENT
Start: 2022-05-02 | End: 2022-05-02

## 2022-05-02 RX ADMIN — FERRIC CARBOXYMALTOSE INJECTION 750 MG: 50 INJECTION, SOLUTION INTRAVENOUS at 14:23

## 2022-05-02 RX ADMIN — SODIUM CHLORIDE 250 ML: 9 INJECTION, SOLUTION INTRAVENOUS at 14:13

## 2022-05-02 NOTE — PROGRESS NOTES
[facility] [address]  [place] [address]  [phone]      FERRIC CARBOXYMALTOSE EDUCATION    NAME:  Arvind Coates      : 1935           DATE: 22    Education Sheets: (list all that apply)  Injectafer                                                                                                                                                                Regimen:  Injectafer 750mg x 2 doses      Notes:   Discussed possible Side effects with patient including headache, dizziness, flushing, N/V, and high blood pressure. Informed patient that the infusion time would be approximately 15 minutes with a 30 minute observation period afterwards to ensure tolerability prior to leaving. Encouraged patient to let staff know if they experiences any allergic reaction or unusual feelings. Patient also aware that this is the 1st of 2 doses and will return next week for second dose. Patient did not have any additional questions at this time.

## 2022-05-09 ENCOUNTER — HOSPITAL ENCOUNTER (OUTPATIENT)
Dept: ONCOLOGY | Facility: HOSPITAL | Age: 87
Setting detail: INFUSION SERIES
Discharge: HOME | End: 2022-05-09

## 2022-05-09 VITALS
DIASTOLIC BLOOD PRESSURE: 80 MMHG | TEMPERATURE: 98.5 F | RESPIRATION RATE: 20 BRPM | SYSTOLIC BLOOD PRESSURE: 145 MMHG | HEART RATE: 74 BPM | OXYGEN SATURATION: 95 %

## 2022-05-09 DIAGNOSIS — K90.49 MALABSORPTION DUE TO INTOLERANCE, NOT ELSEWHERE CLASSIFIED: ICD-10-CM

## 2022-05-09 DIAGNOSIS — D64.89 ANEMIA DUE TO OTHER CAUSE, NOT CLASSIFIED: Primary | ICD-10-CM

## 2022-05-09 PROCEDURE — 96374 THER/PROPH/DIAG INJ IV PUSH: CPT

## 2022-05-09 PROCEDURE — 25010000002 FERRIC CARBOXYMALTOSE 750 MG/15ML SOLUTION: Performed by: NURSE PRACTITIONER

## 2022-05-09 RX ORDER — SODIUM CHLORIDE 9 MG/ML
250 INJECTION, SOLUTION INTRAVENOUS ONCE
Status: COMPLETED | OUTPATIENT
Start: 2022-05-09 | End: 2022-05-09

## 2022-05-09 RX ADMIN — SODIUM CHLORIDE 250 ML: 9 INJECTION, SOLUTION INTRAVENOUS at 14:40

## 2022-05-09 RX ADMIN — FERRIC CARBOXYMALTOSE INJECTION 750 MG: 50 INJECTION, SOLUTION INTRAVENOUS at 14:42

## 2022-05-17 DIAGNOSIS — D50.9 IRON DEFICIENCY ANEMIA, UNSPECIFIED IRON DEFICIENCY ANEMIA TYPE: Primary | ICD-10-CM

## 2022-05-17 DIAGNOSIS — D64.89 ANEMIA DUE TO OTHER CAUSE, NOT CLASSIFIED: ICD-10-CM

## 2022-05-18 NOTE — PROGRESS NOTES
Chief Complaint        Iron deficiency anemia     History of Present Illness      Arvind Coates is a 86 y.o. male who presents to [facility] as a new patient with a history of iron deficiency anemia, positive occult blood and weight loss.  Patient reports last week that he had 2 iron infusions related to iron deficiency anemia.  He was being seen by hematology oncology for his iron deficiency anemia who referred him to gastroenterology.  Patient reports positive occult blood test but does not visibly see blood within his stool.  He reports weight loss over the past year from 182 pounds to 154 pounds.  Patient reports he had COVID last March and his taste and smell has not resumed causing food to taste bad which is causing poor appetite and weight loss.  He reports intermittent lower abdominal pain especially when his bowel movements are backed up for 2 to 3 days with constipation.  Patient reports hydration wise he drinks about 2 bottles of water per day.  He does take stool softeners on a daily basis.  He denies any nausea, vomiting, epigastric pain or reflux.  Patient has atrial fibrillation and a cardiac pacemaker and is on Coumadin with Dr. Jackson with [facility].  Patient denies fever, nausea, vomiting, weight loss, night sweats, melena, hematochezia, hematemesis.    Labs performed on 04/06/22 and 04/21/22 see below    Positive fecal occult blood performed on 04/25/2022    Patient states his last endoscopy was in 2012 in [place].  Patient reports prior large colon polyp that was surgically removed at [facility] in [place]-we will retrieve records.    Results       Result Review :   The following data was reviewed by: OLIVIA Fletcher on 05/19/2022     CMP    CMP 10/8/21 1/25/22 4/21/22   Glucose 150 (A) 92    BUN 36 (A) 28 (A)    Creatinine 1.12 1.27    eGFR Non African Am 62 54 (A)    Sodium 142 142    Potassium 4.9 4.5    Chloride 106 105     Calcium 9.4 9.5    Total Protein   6.7   Albumin 4.60 3.90 3.9   Globulin   2.8   Total Bilirubin 0.9 1.2    Alkaline Phosphatase 74 105    AST (SGOT) 27 23    ALT (SGPT) 38 19    (A) Abnormal value            CBC    CBC 10/8/21 1/25/22 4/21/22   WBC 7.50 6.12 5.64   RBC 4.24 4.20 3.74 (A)   Hemoglobin 9.9 (A) 10.0 (A) 9.6 (A)   Hematocrit 34.1 (A) 32.9 (A) 31.3 (A)   MCV 80.4 78.3 (A) 83.7   MCH 23.3 (A) 23.8 (A) 25.7 (A)   MCHC 29.0 (A) 30.4 (A) 30.7 (A)   RDW 16.7 (A) 17.6 (A) 21.3 (A)   Platelets 201 167 175   (A) Abnormal value              Iron Profile   Iron   Date Value Ref Range Status   04/06/2022 35 (L) 59 - 158 mcg/dL Final     TIBC   Date Value Ref Range Status   04/06/2022 462 298 - 536 mcg/dL Final     Iron Saturation   Date Value Ref Range Status   04/06/2022 8 (L) 20 - 50 % Final     Transferrin   Date Value Ref Range Status   04/06/2022 310 200 - 360 mg/dL Final            Past Medical History       Past Medical History:   Diagnosis Date   • Broken bones    • CHF (congestive heart failure) (HCC)    • COVID-19    • Hemorrhoids    • Hyperlipidemia    • Hypertension    • Pneumonia    • Shortness of breath        Past Surgical History:   Procedure Laterality Date   • APPENDECTOMY     • COLONOSCOPY      2012?- normal per patient   • HEMORRHOIDECTOMY     • OTHER SURGICAL HISTORY      Triple Bi-Pass   • UPPER GASTROINTESTINAL ENDOSCOPY           Current Outpatient Medications:   •  albuterol sulfate  (90 Base) MCG/ACT inhaler, , Disp: , Rfl:   •  aspirin 81 MG chewable tablet, aspirin 81 mg oral tablet,chewable chew 1 tablet (81 mg) by oral route once daily   Active, Disp: , Rfl:   •  atorvastatin (LIPITOR) 40 MG tablet, atorvastatin 40 mg oral tablet take 1 tablet (40 mg) by oral route once daily at bedtime   Active, Disp: , Rfl:   •  fluticasone (FLONASE) 50 MCG/ACT nasal spray, SPRAY 2 SPRAYS IN EACH NOSTRIL BY INTRANASAL ROUTE ONCE DAILY AS NEEDED, Disp: , Rfl:   •  Ipratropium-Albuterol  "(ALBUTEROL-IPRATROPIUM IN), Duo Neb 3 ml inhalation Q 4 hours as needed   Active, Disp: , Rfl:   •  lisinopril (PRINIVIL,ZESTRIL) 10 MG tablet, lisinopril 10 mg oral tablet take 1 tablet (10 mg) by oral route once daily   Active, Disp: , Rfl:   •  metFORMIN ER (GLUCOPHAGE-XR) 500 MG 24 hr tablet, Take 1 tablet by mouth Daily With Breakfast., Disp: 90 tablet, Rfl: 1  •  metoprolol succinate XL (TOPROL-XL) 25 MG 24 hr tablet, metoprolol succinate 25 mg oral tablet extended release 24 hr take 1 tablet (25 mg) by oral route once daily for 30 days   Suspended, Disp: , Rfl:   •  O2 (OXYGEN), Inhale 2 L/min 1 (One) Time., Disp: , Rfl:   •  warfarin (COUMADIN) 2 MG tablet, warfarin 2 mg oral tablet take 1 tablet (2 mg) by oral route once daily   Active, Disp: , Rfl:   •  polyethylene glycol (GoLYTELY) 236 g solution, Starting at noon on day prior to procedure, drink 8 ounces every 30 minutes until all gone or stools are clear. May add flavor packet., Disp: 4000 mL, Rfl: 0     No Known Allergies    Family History   Problem Relation Age of Onset   • Colon cancer Brother    • Prostate cancer Brother    • Diabetes Other         Social History     Social History Narrative   • Not on file       Objective       Objective     Vital Signs:   /65 (BP Location: Left arm, Patient Position: Sitting, Cuff Size: Adult)   Pulse 76   Ht 175.3 cm (69\")   Wt 70.2 kg (154 lb 12.8 oz)   SpO2 100%   BMI 22.86 kg/m²     Body mass index is 22.86 kg/m².    Physical Exam  Constitutional:       General: He is not in acute distress.     Appearance: Normal appearance. He is well-developed and normal weight.   Eyes:      Conjunctiva/sclera: Conjunctivae normal.      Pupils: Pupils are equal, round, and reactive to light.      Visual Fields: Right eye visual fields normal and left eye visual fields normal.   Cardiovascular:      Rate and Rhythm: Normal rate and regular rhythm.      Heart sounds: Normal heart sounds.   Pulmonary:      Effort: " Pulmonary effort is normal. No retractions.      Breath sounds: Normal breath sounds and air entry.      Comments: Inspection of chest: normal appearance  Abdominal:      General: Bowel sounds are normal.      Palpations: Abdomen is soft.      Tenderness: There is no abdominal tenderness.      Comments: No appreciable hepatosplenomegaly   Musculoskeletal:      Cervical back: Neck supple.      Right lower leg: No edema.      Left lower leg: No edema.   Lymphadenopathy:      Cervical: No cervical adenopathy.   Skin:     Findings: No lesion.      Comments: Turgor normal   Neurological:      Mental Status: He is alert and oriented to person, place, and time.   Psychiatric:         Mood and Affect: Mood and affect normal.              Assessment & Plan          Assessment and Plan    Diagnoses and all orders for this visit:    1. Atrial fibrillation, unspecified type (HCC) (Primary)    2. Hemorrhoids, unspecified hemorrhoid type    3. Iron deficiency anemia, unspecified iron deficiency anemia type    4. Weight loss    5. Positive occult stool blood test    6. Lower abdominal pain    Other orders  -     polyethylene glycol (GoLYTELY) 236 g solution; Starting at noon on day prior to procedure, drink 8 ounces every 30 minutes until all gone or stools are clear. May add flavor packet.  Dispense: 4000 mL; Refill: 0      86-year-old male presenting the office today as a new patient with a history of iron deficiency anemia, positive occult blood and weight loss.  I have recommended that the patient undergo further evaluation with an EGD and colonoscopy.  I have discussed this procedure in detail with the patient.  I have discussed the risks, benefits and alternatives.  I have discussed the risk of anesthesia, bleeding and perforation.  Patient understands these risks, benefits and alternatives and wishes to proceed.  I will schedule him at his earliest convenience.  Patient has a follow-up appointment with hematology oncology  June 2 he will maintain appointment.  Patient has repeat blood work today.  We will seek cardiac clearance from Dr. Jackson related to the patient's Coumadin.  Patient will follow-up in the office after endoscopy.  Patient agreeable to this plan and will call with any questions or concerns.            Follow Up       Follow Up   Return for Follow up after endoscopy in office.  Patient was given instructions and counseling regarding his condition or for health maintenance advice. Please see specific information pulled into the AVS if appropriate.

## 2022-05-19 ENCOUNTER — APPOINTMENT (OUTPATIENT)
Dept: ONCOLOGY | Facility: HOSPITAL | Age: 87
End: 2022-05-19

## 2022-05-19 ENCOUNTER — OFFICE VISIT (OUTPATIENT)
Dept: GASTROENTEROLOGY | Facility: CLINIC | Age: 87
End: 2022-05-19

## 2022-05-19 ENCOUNTER — PREP FOR SURGERY (OUTPATIENT)
Dept: OTHER | Facility: HOSPITAL | Age: 87
End: 2022-05-19

## 2022-05-19 ENCOUNTER — LAB (OUTPATIENT)
Dept: ONCOLOGY | Facility: HOSPITAL | Age: 87
End: 2022-05-19

## 2022-05-19 VITALS
WEIGHT: 154.8 LBS | DIASTOLIC BLOOD PRESSURE: 65 MMHG | HEART RATE: 76 BPM | HEIGHT: 69 IN | SYSTOLIC BLOOD PRESSURE: 149 MMHG | BODY MASS INDEX: 22.93 KG/M2 | OXYGEN SATURATION: 100 %

## 2022-05-19 DIAGNOSIS — D50.9 IRON DEFICIENCY ANEMIA, UNSPECIFIED IRON DEFICIENCY ANEMIA TYPE: ICD-10-CM

## 2022-05-19 DIAGNOSIS — R63.4 WEIGHT LOSS: ICD-10-CM

## 2022-05-19 DIAGNOSIS — D64.89 ANEMIA DUE TO OTHER CAUSE, NOT CLASSIFIED: ICD-10-CM

## 2022-05-19 DIAGNOSIS — R19.5 POSITIVE OCCULT STOOL BLOOD TEST: ICD-10-CM

## 2022-05-19 DIAGNOSIS — K64.9 HEMORRHOIDS, UNSPECIFIED HEMORRHOID TYPE: ICD-10-CM

## 2022-05-19 DIAGNOSIS — I48.91 ATRIAL FIBRILLATION, UNSPECIFIED TYPE: Primary | ICD-10-CM

## 2022-05-19 DIAGNOSIS — R10.30 LOWER ABDOMINAL PAIN: ICD-10-CM

## 2022-05-19 LAB
FERRITIN SERPL-MCNC: 1062 NG/ML (ref 30–400)
IRON 24H UR-MRATE: 89 MCG/DL (ref 59–158)
IRON SATN MFR SERPL: 28 % (ref 20–50)
TIBC SERPL-MCNC: 323 MCG/DL (ref 298–536)
TRANSFERRIN SERPL-MCNC: 217 MG/DL (ref 200–360)

## 2022-05-19 PROCEDURE — 99214 OFFICE O/P EST MOD 30 MIN: CPT | Performed by: NURSE PRACTITIONER

## 2022-05-19 PROCEDURE — 83540 ASSAY OF IRON: CPT

## 2022-05-19 PROCEDURE — 36415 COLL VENOUS BLD VENIPUNCTURE: CPT

## 2022-05-19 PROCEDURE — 84466 ASSAY OF TRANSFERRIN: CPT

## 2022-05-19 PROCEDURE — 82728 ASSAY OF FERRITIN: CPT

## 2022-05-20 ENCOUNTER — TELEPHONE (OUTPATIENT)
Dept: FAMILY MEDICINE CLINIC | Facility: CLINIC | Age: 87
End: 2022-05-20

## 2022-05-20 NOTE — TELEPHONE ENCOUNTER
Caller: JOSE ARMANDO    Relationship:  [employer]     Best call back number: [phone]    What form or medical record are you requesting: NOTES FROM APPOINTMENTS ON 04/16 AND 01/25     Who is requesting this form or medical record from you: [employer]     How would you like to receive the form or medical records (pick-up, mail, fax): FAX  [phone]  Timeframe paperwork needed: ASAP

## 2022-06-02 ENCOUNTER — OFFICE VISIT (OUTPATIENT)
Dept: ONCOLOGY | Facility: HOSPITAL | Age: 87
End: 2022-06-02

## 2022-06-02 ENCOUNTER — LAB (OUTPATIENT)
Dept: ONCOLOGY | Facility: HOSPITAL | Age: 87
End: 2022-06-02

## 2022-06-02 VITALS
WEIGHT: 147.71 LBS | TEMPERATURE: 97.3 F | HEART RATE: 60 BPM | DIASTOLIC BLOOD PRESSURE: 74 MMHG | RESPIRATION RATE: 16 BRPM | BODY MASS INDEX: 21.81 KG/M2 | SYSTOLIC BLOOD PRESSURE: 135 MMHG | OXYGEN SATURATION: 98 %

## 2022-06-02 DIAGNOSIS — D50.9 IRON DEFICIENCY ANEMIA, UNSPECIFIED IRON DEFICIENCY ANEMIA TYPE: ICD-10-CM

## 2022-06-02 DIAGNOSIS — D50.9 IRON DEFICIENCY ANEMIA, UNSPECIFIED IRON DEFICIENCY ANEMIA TYPE: Primary | ICD-10-CM

## 2022-06-02 DIAGNOSIS — D47.2 MONOCLONAL GAMMOPATHY: ICD-10-CM

## 2022-06-02 LAB
ALBUMIN SERPL-MCNC: 4.25 G/DL (ref 3.5–5.2)
ALBUMIN/GLOB SERPL: 1.5 G/DL
ALP SERPL-CCNC: 115 U/L (ref 39–117)
ALT SERPL W P-5'-P-CCNC: 23 U/L (ref 1–41)
ANION GAP SERPL CALCULATED.3IONS-SCNC: 7.5 MMOL/L (ref 5–15)
AST SERPL-CCNC: 29 U/L (ref 1–40)
BASOPHILS # BLD AUTO: 0.03 10*3/MM3 (ref 0–0.2)
BASOPHILS NFR BLD AUTO: 0.4 % (ref 0–1.5)
BILIRUB SERPL-MCNC: 1.6 MG/DL (ref 0–1.2)
BUN SERPL-MCNC: 22 MG/DL (ref 8–23)
BUN/CREAT SERPL: 16.9 (ref 7–25)
CALCIUM SPEC-SCNC: 9.2 MG/DL (ref 8.6–10.5)
CHLORIDE SERPL-SCNC: 103 MMOL/L (ref 98–107)
CO2 SERPL-SCNC: 26.5 MMOL/L (ref 22–29)
CREAT SERPL-MCNC: 1.3 MG/DL (ref 0.76–1.27)
DEPRECATED RDW RBC AUTO: 78.2 FL (ref 37–54)
EGFRCR SERPLBLD CKD-EPI 2021: 53.5 ML/MIN/1.73
EOSINOPHIL # BLD AUTO: 0.1 10*3/MM3 (ref 0–0.4)
EOSINOPHIL NFR BLD AUTO: 1.4 % (ref 0.3–6.2)
ERYTHROCYTE [DISTWIDTH] IN BLOOD BY AUTOMATED COUNT: 24.6 % (ref 12.3–15.4)
FERRITIN SERPL-MCNC: 763.4 NG/ML (ref 30–400)
GLOBULIN UR ELPH-MCNC: 2.9 GM/DL
GLUCOSE SERPL-MCNC: 82 MG/DL (ref 65–99)
HCT VFR BLD AUTO: 36.5 % (ref 37.5–51)
HGB BLD-MCNC: 11.7 G/DL (ref 13–17.7)
IMM GRANULOCYTES # BLD AUTO: 0.02 10*3/MM3 (ref 0–0.05)
IMM GRANULOCYTES NFR BLD AUTO: 0.3 % (ref 0–0.5)
IRON 24H UR-MRATE: 27 MCG/DL (ref 59–158)
IRON SATN MFR SERPL: 10 % (ref 20–50)
LYMPHOCYTES # BLD AUTO: 0.56 10*3/MM3 (ref 0.7–3.1)
LYMPHOCYTES NFR BLD AUTO: 8 % (ref 19.6–45.3)
MCH RBC QN AUTO: 28.7 PG (ref 26.6–33)
MCHC RBC AUTO-ENTMCNC: 32.1 G/DL (ref 31.5–35.7)
MCV RBC AUTO: 89.5 FL (ref 79–97)
MONOCYTES # BLD AUTO: 0.78 10*3/MM3 (ref 0.1–0.9)
MONOCYTES NFR BLD AUTO: 11.1 % (ref 5–12)
NEUTROPHILS NFR BLD AUTO: 5.51 10*3/MM3 (ref 1.7–7)
NEUTROPHILS NFR BLD AUTO: 78.8 % (ref 42.7–76)
PLATELET # BLD AUTO: 144 10*3/MM3 (ref 140–450)
PMV BLD AUTO: 10.7 FL (ref 6–12)
POTASSIUM SERPL-SCNC: 4.3 MMOL/L (ref 3.5–5.2)
PROT SERPL-MCNC: 7.1 G/DL (ref 6–8.5)
RBC # BLD AUTO: 4.08 10*6/MM3 (ref 4.14–5.8)
SODIUM SERPL-SCNC: 137 MMOL/L (ref 136–145)
TIBC SERPL-MCNC: 270 MCG/DL (ref 298–536)
TRANSFERRIN SERPL-MCNC: 181 MG/DL (ref 200–360)
WBC NRBC COR # BLD: 7 10*3/MM3 (ref 3.4–10.8)

## 2022-06-02 PROCEDURE — 84466 ASSAY OF TRANSFERRIN: CPT

## 2022-06-02 PROCEDURE — 80053 COMPREHEN METABOLIC PANEL: CPT

## 2022-06-02 PROCEDURE — 82728 ASSAY OF FERRITIN: CPT

## 2022-06-02 PROCEDURE — 36415 COLL VENOUS BLD VENIPUNCTURE: CPT

## 2022-06-02 PROCEDURE — 83540 ASSAY OF IRON: CPT

## 2022-06-02 PROCEDURE — G0463 HOSPITAL OUTPT CLINIC VISIT: HCPCS | Performed by: INTERNAL MEDICINE

## 2022-06-02 PROCEDURE — G0463 HOSPITAL OUTPT CLINIC VISIT: HCPCS

## 2022-06-02 PROCEDURE — 99214 OFFICE O/P EST MOD 30 MIN: CPT | Performed by: INTERNAL MEDICINE

## 2022-06-02 PROCEDURE — 85025 COMPLETE CBC W/AUTO DIFF WBC: CPT

## 2022-06-02 NOTE — ASSESSMENT & PLAN NOTE
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  Free light chain ratio also elevated.  Patient will need MGUS evaluation including skeletal survey and bone marrow aspiration/biopsy.  These will be arranged soon as possible.  I will see him back in the near future to review.

## 2022-06-02 NOTE — ASSESSMENT & PLAN NOTE
Clinically feeling better after iron infusion.  I will repeat CBC, iron profile and ferritin today.  He was found to be heme positive on stool check.  He needs to follow-up with gastroenterology for evaluation.

## 2022-06-02 NOTE — PROGRESS NOTES
Chief Complaint  Anemia    Paco Daniel APRN Lowder, Bradley, APRN    Subjective          Arvind Coates presents to [facility] HEMATOLOGY [facility] ONCOLOGY for follow-up of iron deficiency anemia treated with IV iron.  Patient notes he felt better after the IV infusions.  He denies any obvious blood loss although he is on blood thinner with warfarin.  He did have some blood in his urine a few days ago but feels like he passed a kidney stone.  This has resolved.  He was noted to be heme positive but has not yet gotten appointment with GI.  He reports adequate appetite.  He denies fever, chills, weight loss, night sweats or adenopathy.    Oncology/Hematology History    No history exists.       Review of Systems   Constitutional: Positive for fatigue. Negative for appetite change, diaphoresis, fever, unexpected weight gain and unexpected weight loss.   HENT: Positive for hearing loss. Negative for mouth sores, sore throat, swollen glands, trouble swallowing and voice change.    Eyes: Negative for blurred vision.   Respiratory: Positive for shortness of breath. Negative for cough and wheezing.    Cardiovascular: Negative for chest pain and palpitations.   Gastrointestinal: Negative for abdominal pain, blood in stool, constipation, diarrhea, nausea and vomiting.   Endocrine: Negative for cold intolerance and heat intolerance.   Genitourinary: Negative for difficulty urinating, dysuria, frequency, hematuria and urinary incontinence.   Musculoskeletal: Negative for arthralgias, back pain and myalgias.   Skin: Negative for rash, skin lesions and wound.   Neurological: Negative for dizziness, seizures, weakness, numbness and headache.   Hematological: Does not bruise/bleed easily.   Psychiatric/Behavioral: Negative for depressed mood. The patient is not nervous/anxious.      Current Outpatient Medications on File Prior to Visit   Medication Sig Dispense Refill   • albuterol sulfate  (90 Base) MCG/ACT  inhaler      • aspirin 81 MG chewable tablet aspirin 81 mg oral tablet,chewable chew 1 tablet (81 mg) by oral route once daily   Active     • atorvastatin (LIPITOR) 40 MG tablet atorvastatin 40 mg oral tablet take 1 tablet (40 mg) by oral route once daily at bedtime   Active     • fluticasone (FLONASE) 50 MCG/ACT nasal spray SPRAY 2 SPRAYS IN EACH NOSTRIL BY INTRANASAL ROUTE ONCE DAILY AS NEEDED     • Ipratropium-Albuterol (ALBUTEROL-IPRATROPIUM IN) Duo Neb 3 ml inhalation Q 4 hours as needed   Active     • lisinopril (PRINIVIL,ZESTRIL) 10 MG tablet lisinopril 10 mg oral tablet take 1 tablet (10 mg) by oral route once daily   Active     • metFORMIN ER (GLUCOPHAGE-XR) 500 MG 24 hr tablet Take 1 tablet by mouth Daily With Breakfast. 90 tablet 1   • metoprolol succinate XL (TOPROL-XL) 25 MG 24 hr tablet metoprolol succinate 25 mg oral tablet extended release 24 hr take 1 tablet (25 mg) by oral route once daily for 30 days   Suspended     • O2 (OXYGEN) Inhale 2 L/min 1 (One) Time.     • polyethylene glycol (GoLYTELY) 236 g solution Starting at noon on day prior to procedure, drink 8 ounces every 30 minutes until all gone or stools are clear. May add flavor packet. 4000 mL 0   • warfarin (COUMADIN) 2 MG tablet warfarin 2 mg oral tablet take 1 tablet (2 mg) by oral route once daily   Active       No current facility-administered medications on file prior to visit.       No Known Allergies  Past Medical History:   Diagnosis Date   • Anemia    • Broken bones    • CHF (congestive heart failure) (HCC)    • COVID-19    • Hemorrhoids    • Hyperlipidemia    • Hypertension    • Pneumonia    • Shortness of breath      Past Surgical History:   Procedure Laterality Date   • APPENDECTOMY     • COLONOSCOPY      2012?- normal per patient   • HEMORRHOIDECTOMY     • OTHER SURGICAL HISTORY      Triple Bi-Pass   • UPPER GASTROINTESTINAL ENDOSCOPY       Social History     Socioeconomic History   • Marital status: Single   Tobacco Use   •  Smoking status: Passive Smoke Exposure - Never Smoker   • Smokeless tobacco: Never Used   Vaping Use   • Vaping Use: Never used   Substance and Sexual Activity   • Alcohol use: Never   • Drug use: Defer   • Sexual activity: Defer     Family History   Problem Relation Age of Onset   • Colon cancer Brother    • Prostate cancer Brother    • Diabetes Other        Objective   Physical Exam  Vitals reviewed. Exam conducted with a chaperone present.   Constitutional:       General: He is not in acute distress.     Appearance: Normal appearance.   Cardiovascular:      Rate and Rhythm: Normal rate and regular rhythm.      Heart sounds: Normal heart sounds. No murmur heard.    No gallop.   Pulmonary:      Effort: Pulmonary effort is normal.      Breath sounds: Normal breath sounds.   Abdominal:      General: Abdomen is flat. Bowel sounds are normal.      Palpations: Abdomen is soft.   Musculoskeletal:      Cervical back: Neck supple.      Right lower leg: No edema.      Left lower leg: No edema.   Lymphadenopathy:      Cervical: No cervical adenopathy.   Neurological:      Mental Status: He is alert and oriented to person, place, and time.   Psychiatric:         Mood and Affect: Mood normal.         Behavior: Behavior normal.         Vitals:    06/02/22 1254   BP: 135/74   Pulse: 60   Resp: 16   Temp: 97.3 °F (36.3 °C)   SpO2: 98%   Weight: 67 kg (147 lb 11.3 oz)   PainSc: 0-No pain     ECOG score: 1         PHQ-9 Total Score:                    Result Review :   The following data was reviewed by: Molina Shen MD on 06/02/2022:  Lab Results   Component Value Date    HGB 11.7 (L) 06/02/2022    HCT 36.5 (L) 06/02/2022    MCV 89.5 06/02/2022     06/02/2022    WBC 7.00 06/02/2022    NEUTROABS 5.51 06/02/2022    LYMPHSABS 0.56 (L) 06/02/2022    MONOSABS 0.78 06/02/2022    EOSABS 0.10 06/02/2022    BASOSABS 0.03 06/02/2022     Lab Results   Component Value Date    GLUCOSE 82 06/02/2022    BUN 22 06/02/2022     CREATININE 1.30 (H) 06/02/2022     06/02/2022    K 4.3 06/02/2022     06/02/2022    CO2 26.5 06/02/2022    CALCIUM 9.2 06/02/2022    PROTEINTOT 7.1 06/02/2022    ALBUMIN 4.25 06/02/2022    BILITOT 1.6 (H) 06/02/2022    ALKPHOS 115 06/02/2022    AST 29 06/02/2022    ALT 23 06/02/2022     Lab Results   Component Value Date    MG 2.16 04/03/2021    PHOS 3.6 03/25/2021    TSH 2.830 10/08/2021             Assessment and Plan    Diagnoses and all orders for this visit:    1. Iron deficiency anemia, unspecified iron deficiency anemia type (Primary)  Assessment & Plan:  Clinically feeling better after iron infusion.  I will repeat CBC, iron profile and ferritin today.  He was found to be heme positive on stool check.  He needs to follow-up with gastroenterology for evaluation.    Orders:  -     Cancel: Bone Marrow Exam; Future  -     CT Guided Biopsy Bone Marrow; Future  -     Cancel: NM Bone Scan Whole Body; Future  -     CBC & Differential; Future  -     Comprehensive Metabolic Panel; Future  -     Ferritin; Future  -     Iron Profile; Future  -     Bone Marrow Exam; Future    2. Monoclonal gammopathy  Assessment & Plan:  Small monoclonal protein noted on UPEP.  Free light chain ratio also elevated.  Patient will need MGUS evaluation including skeletal survey and bone marrow aspiration/biopsy.  These will be arranged soon as possible.  I will see him back in the near future to review.    Orders:  -     XR Bone Survey Complete; Future              Patient was given instructions and counseling regarding his condition or for health maintenance advice. Please see specific information pulled into the AVS if appropriate.     Molina Shen MD    6/2/2022

## 2022-06-08 ENCOUNTER — OFFICE VISIT (OUTPATIENT)
Dept: PULMONOLOGY | Facility: CLINIC | Age: 87
End: 2022-06-08

## 2022-06-08 VITALS
RESPIRATION RATE: 16 BRPM | BODY MASS INDEX: 20.88 KG/M2 | WEIGHT: 141 LBS | DIASTOLIC BLOOD PRESSURE: 67 MMHG | HEART RATE: 76 BPM | HEIGHT: 69 IN | SYSTOLIC BLOOD PRESSURE: 119 MMHG | OXYGEN SATURATION: 99 %

## 2022-06-08 DIAGNOSIS — I10 PRIMARY HYPERTENSION: ICD-10-CM

## 2022-06-08 DIAGNOSIS — J43.9 PULMONARY EMPHYSEMA, UNSPECIFIED EMPHYSEMA TYPE: Primary | ICD-10-CM

## 2022-06-08 DIAGNOSIS — J96.11 CHRONIC RESPIRATORY FAILURE WITH HYPOXIA: ICD-10-CM

## 2022-06-08 DIAGNOSIS — I48.91 ATRIAL FIBRILLATION, UNSPECIFIED TYPE: ICD-10-CM

## 2022-06-08 DIAGNOSIS — Z23 ENCOUNTER FOR IMMUNIZATION: ICD-10-CM

## 2022-06-08 DIAGNOSIS — D50.9 IRON DEFICIENCY ANEMIA, UNSPECIFIED IRON DEFICIENCY ANEMIA TYPE: ICD-10-CM

## 2022-06-08 DIAGNOSIS — R06.09 DYSPNEA ON EXERTION: ICD-10-CM

## 2022-06-08 PROCEDURE — G0009 ADMIN PNEUMOCOCCAL VACCINE: HCPCS | Performed by: NURSE PRACTITIONER

## 2022-06-08 PROCEDURE — 99213 OFFICE O/P EST LOW 20 MIN: CPT | Performed by: NURSE PRACTITIONER

## 2022-06-08 PROCEDURE — 90677 PCV20 VACCINE IM: CPT | Performed by: NURSE PRACTITIONER

## 2022-06-08 NOTE — PROGRESS NOTES
Primary Care Provider  Paco Daniel APRN     Referring Provider  No ref. provider found     Chief Complaint  Emphysema (4 month f/u) and Shortness of Breath (No more than usual. )    Subjective          Arvind Coates presents to [facility] PULMONARY [facility] CRITICAL CARE MEDICINE  History of Present Illness  Arvind Coates is a 86 y.o. male patient previously seen by Dr. Morin.  He is here for management of emphysema, atrial fibrillation, dyspnea on exertion, hypertension and chronic respiratory failure with hypoxia.    Patient states he is doing well since his last visit.  He denies using any antibiotics or steroids for his lungs.  He denies any fevers or chills.  Patient occasionally has a nonproductive cough.  He describes her shortness of breath as mild in severity, worse with exertion and improved with rest.  He states he uses his albuterol infrequently every few days and his DuoNebs 1-2 times a day.  He does have oxygen at home to use as needed and states that he has not used it for the past few days as well.  Continues to follow-up with Dr. Shen for iron deficiency anemia and is scheduled for a bone marrow biopsy on Monday.  He is also scheduled for an esophagogastroduodenoscopy and colonoscopy in August.  He continues to follow-up with cardiology for management of his atrial fibrillation.  Overall, patient has no concerns with his breathing.  He is able to perform his ADLs without difficulty.  He is up-to-date with his COVID and flu vaccine.  He would like to receive a pneumonia vaccine today in office.     His history of smoking is   Tobacco Use: Medium Risk   • Smoking Tobacco Use: Passive Smoke Exposure - Never Smoker   • Smokeless Tobacco Use: Never Used   .    Review of Systems   Constitutional: Negative for chills, fatigue, fever, unexpected weight gain and unexpected weight loss.   HENT: Negative for congestion (Nasal), hearing loss, mouth sores, nosebleeds, postnasal drip, sore  throat and trouble swallowing.    Eyes: Negative for blurred vision and visual disturbance.   Respiratory: Positive for shortness of breath. Negative for apnea, cough and wheezing.         Negative for Hemoptysis     Cardiovascular: Negative for chest pain, palpitations and leg swelling.   Gastrointestinal: Negative for abdominal pain, constipation, diarrhea, nausea, vomiting and GERD.        Negative for Jaundice  Negative for Bloating  Negative for Melena   Musculoskeletal: Negative for joint swelling and myalgias.        Negative for Joint pain  Negative for Joint stiffness   Skin: Negative for color change.        Negative for cyanosis   Neurological: Negative for syncope, weakness, numbness and headache.      Sleep: Negative for Excessive daytime sleepiness  Negative for morning headaches  Negative for Snoring    Family History   Problem Relation Age of Onset   • Colon cancer Brother    • Prostate cancer Brother    • Diabetes Other         Social History     Socioeconomic History   • Marital status: Single   Tobacco Use   • Smoking status: Passive Smoke Exposure - Never Smoker   • Smokeless tobacco: Never Used   Vaping Use   • Vaping Use: Never used   Substance and Sexual Activity   • Alcohol use: Never   • Drug use: Defer   • Sexual activity: Defer        Past Medical History:   Diagnosis Date   • Anemia    • Broken bones    • CHF (congestive heart failure) (HCC)    • COVID-19    • Hemorrhoids    • Hyperlipidemia    • Hypertension    • Pneumonia    • Shortness of breath         Immunization History   Administered Date(s) Administered   • COVID-19 (MODERNA) 1st, 2nd, 3rd Dose Only 03/10/2021   • Fluzone High-Dose 65+yrs 10/08/2021   • Pneumococcal Conjugate 20-Valent (PCV20) 06/08/2022         No Known Allergies       Current Outpatient Medications:   •  albuterol sulfate  (90 Base) MCG/ACT inhaler, , Disp: , Rfl:   •  aspirin 81 MG chewable tablet, aspirin 81 mg oral tablet,chewable chew 1 tablet (81  mg) by oral route once daily   Active, Disp: , Rfl:   •  atorvastatin (LIPITOR) 40 MG tablet, atorvastatin 40 mg oral tablet take 1 tablet (40 mg) by oral route once daily at bedtime   Active, Disp: , Rfl:   •  fluticasone (FLONASE) 50 MCG/ACT nasal spray, SPRAY 2 SPRAYS IN EACH NOSTRIL BY INTRANASAL ROUTE ONCE DAILY AS NEEDED, Disp: , Rfl:   •  Ipratropium-Albuterol (ALBUTEROL-IPRATROPIUM IN), Duo Neb 3 ml inhalation Q 4 hours as needed   Active, Disp: , Rfl:   •  lisinopril (PRINIVIL,ZESTRIL) 10 MG tablet, lisinopril 10 mg oral tablet take 1 tablet (10 mg) by oral route once daily   Active, Disp: , Rfl:   •  metFORMIN ER (GLUCOPHAGE-XR) 500 MG 24 hr tablet, Take 1 tablet by mouth Daily With Breakfast., Disp: 90 tablet, Rfl: 1  •  metoprolol succinate XL (TOPROL-XL) 25 MG 24 hr tablet, metoprolol succinate 25 mg oral tablet extended release 24 hr take 1 tablet (25 mg) by oral route once daily for 30 days   Suspended, Disp: , Rfl:   •  O2 (OXYGEN), Inhale 2 L/min 1 (One) Time., Disp: , Rfl:   •  polyethylene glycol (GoLYTELY) 236 g solution, Starting at noon on day prior to procedure, drink 8 ounces every 30 minutes until all gone or stools are clear. May add flavor packet., Disp: 4000 mL, Rfl: 0  •  warfarin (COUMADIN) 2 MG tablet, warfarin 2 mg oral tablet take 1 tablet (2 mg) by oral route once daily   Active, Disp: , Rfl:      Objective   Physical Exam  Constitutional:       General: He is not in acute distress.     Appearance: Normal appearance. He is normal weight.   HENT:      Right Ear: Hearing normal.      Left Ear: Hearing normal.      Nose: No nasal tenderness or congestion.      Mouth/Throat:      Mouth: Mucous membranes are moist. No oral lesions.   Eyes:      Extraocular Movements: Extraocular movements intact.      Pupils: Pupils are equal, round, and reactive to light.   Neck:      Thyroid: No thyroid mass or thyromegaly.   Cardiovascular:      Rate and Rhythm: Normal rate and regular rhythm.       "Pulses: Normal pulses.      Heart sounds: Normal heart sounds. No murmur heard.  Pulmonary:      Effort: Pulmonary effort is normal.      Breath sounds: Normal breath sounds. No wheezing, rhonchi or rales.   Chest:   Breasts:      Right: No axillary adenopathy.       Abdominal:      General: Bowel sounds are normal. There is no distension.      Palpations: Abdomen is soft.      Tenderness: There is no abdominal tenderness.   Musculoskeletal:      Cervical back: Neck supple.      Right lower leg: No edema.      Left lower leg: No edema.   Lymphadenopathy:      Cervical: No cervical adenopathy.      Upper Body:      Right upper body: No axillary adenopathy.   Skin:     General: Skin is warm and dry.      Findings: No lesion or rash.   Neurological:      General: No focal deficit present.      Mental Status: He is alert and oriented to person, place, and time.      Cranial Nerves: Cranial nerves are intact.   Psychiatric:         Mood and Affect: Affect normal. Mood is not anxious or depressed.         Vital Signs:   /67 (BP Location: Left arm, Patient Position: Sitting, Cuff Size: Adult)   Pulse 76   Resp 16   Ht 175.3 cm (69\")   Wt 64 kg (141 lb)   SpO2 99% Comment: rooma ir\  BMI 20.82 kg/m²        Result Review :     CMP    CMP 1/25/22 4/21/22 6/2/22   Glucose 92  82   BUN 28 (A)  22   Creatinine 1.27  1.30 (A)   eGFR Non African Am 54 (A)     Sodium 142  137   Potassium 4.5  4.3   Chloride 105  103   Calcium 9.5  9.2   Total Protein  6.7    Albumin 3.90 3.9 4.25   Globulin  2.8    Total Bilirubin 1.2  1.6 (A)   Alkaline Phosphatase 105  115   AST (SGOT) 23  29   ALT (SGPT) 19  23   (A) Abnormal value            CBC w/diff    CBC w/Diff 1/25/22 4/21/22 6/2/22   WBC 6.12 5.64 7.00   RBC 4.20 3.74 (A) 4.08 (A)   Hemoglobin 10.0 (A) 9.6 (A) 11.7 (A)   Hematocrit 32.9 (A) 31.3 (A) 36.5 (A)   MCV 78.3 (A) 83.7 89.5   MCH 23.8 (A) 25.7 (A) 28.7   MCHC 30.4 (A) 30.7 (A) 32.1   RDW 17.6 (A) 21.3 (A) 24.6 (A) "   Platelets 167 175 144   Neutrophil Rel % 61.0  78.8 (A)   Immature Granulocyte Rel %   0.3   Lymphocyte Rel % 19.4 (A)  8.0 (A)   Monocyte Rel % 16.5 (A)  11.1   Eosinophil Rel % 2.0  1.4   Basophil Rel % 0.8  0.4   (A) Abnormal value            Data reviewed: Radiologic studies Chest CT 12/30/2021, Cardiology studies Echocardiogram 4/1/2021 and My last office note   Procedures        Assessment and Plan    Diagnoses and all orders for this visit:    1. Pulmonary emphysema, unspecified emphysema type (HCC) (Primary)    2. Primary hypertension    3. Atrial fibrillation, unspecified type (HCC)    4. Chronic respiratory failure with hypoxia (HCC)    5. Dyspnea on exertion    6. Encounter for immunization  -     Pneumococcal Conjugate Vaccine 20-Valent (PCV20)    7. Iron deficiency anemia, unspecified iron deficiency anemia type    8.  Continue albuterol and nebulizer treatments as needed.  9.  Continue supplemental oxygen to maintain saturations at or above 89%.  10.  Follow-up with cardiology as scheduled.  11.  Follow-up with gastroenterology as scheduled.  12.  Follow-up with hematology as scheduled  13.  Follow-up with our office in 6 to 9 months, sooner if needed.        Follow Up   Return in about 6 months (around 12/8/2022) for Recheck with Gayle.  Patient was given instructions and counseling regarding his condition or for health maintenance advice. Please see specific information pulled into the AVS if appropriate.

## 2022-06-13 ENCOUNTER — HOSPITAL ENCOUNTER (OUTPATIENT)
Dept: CT IMAGING | Facility: HOSPITAL | Age: 87
Discharge: HOME | End: 2022-06-13
Admitting: INTERNAL MEDICINE

## 2022-06-13 VITALS
RESPIRATION RATE: 15 BRPM | DIASTOLIC BLOOD PRESSURE: 78 MMHG | HEART RATE: 72 BPM | SYSTOLIC BLOOD PRESSURE: 145 MMHG | OXYGEN SATURATION: 94 %

## 2022-06-13 DIAGNOSIS — D50.9 IRON DEFICIENCY ANEMIA, UNSPECIFIED IRON DEFICIENCY ANEMIA TYPE: ICD-10-CM

## 2022-06-13 LAB
ACANTHOCYTES BLD QL SMEAR: NORMAL
ANISOCYTOSIS BLD QL: NORMAL
BASOPHILS # BLD AUTO: 0.05 10*3/MM3 (ref 0–0.2)
BASOPHILS NFR BLD AUTO: 0.8 % (ref 0–1.5)
C3 FRG RBC-MCNC: NORMAL
DEPRECATED RDW RBC AUTO: 77.7 FL (ref 37–54)
ELLIPTOCYTES BLD QL SMEAR: NORMAL
EOSINOPHIL # BLD AUTO: 0.15 10*3/MM3 (ref 0–0.4)
EOSINOPHIL NFR BLD AUTO: 2.4 % (ref 0.3–6.2)
ERYTHROCYTE [DISTWIDTH] IN BLOOD BY AUTOMATED COUNT: 24 % (ref 12.3–15.4)
HCT VFR BLD AUTO: 40.3 % (ref 37.5–51)
HGB BLD-MCNC: 12.8 G/DL (ref 13–17.7)
HYPOCHROMIA BLD QL: NORMAL
IMM GRANULOCYTES # BLD AUTO: 0.02 10*3/MM3 (ref 0–0.05)
IMM GRANULOCYTES NFR BLD AUTO: 0.3 % (ref 0–0.5)
LYMPHOCYTES # BLD AUTO: 1.2 10*3/MM3 (ref 0.7–3.1)
LYMPHOCYTES NFR BLD AUTO: 19.4 % (ref 19.6–45.3)
MCH RBC QN AUTO: 29 PG (ref 26.6–33)
MCHC RBC AUTO-ENTMCNC: 31.8 G/DL (ref 31.5–35.7)
MCV RBC AUTO: 91.2 FL (ref 79–97)
MICROCYTES BLD QL: NORMAL
MONOCYTES # BLD AUTO: 0.59 10*3/MM3 (ref 0.1–0.9)
MONOCYTES NFR BLD AUTO: 9.6 % (ref 5–12)
NEUTROPHILS NFR BLD AUTO: 4.16 10*3/MM3 (ref 1.7–7)
NEUTROPHILS NFR BLD AUTO: 67.5 % (ref 42.7–76)
NRBC BLD AUTO-RTO: 0 /100 WBC (ref 0–0.2)
PLATELET # BLD AUTO: 169 10*3/MM3 (ref 140–450)
PMV BLD AUTO: 10.1 FL (ref 6–12)
POIKILOCYTOSIS BLD QL SMEAR: NORMAL
RBC # BLD AUTO: 4.42 10*6/MM3 (ref 4.14–5.8)
SCHISTOCYTES BLD QL SMEAR: NORMAL
SMALL PLATELETS BLD QL SMEAR: ADEQUATE
WBC MORPH BLD: NORMAL
WBC NRBC COR # BLD: 6.17 10*3/MM3 (ref 3.4–10.8)

## 2022-06-13 PROCEDURE — 88184 FLOWCYTOMETRY/ TC 1 MARKER: CPT

## 2022-06-13 PROCEDURE — 88185 FLOWCYTOMETRY/TC ADD-ON: CPT

## 2022-06-13 PROCEDURE — 88275 CYTOGENETICS 100-300: CPT

## 2022-06-13 PROCEDURE — 88264 CHROMOSOME ANALYSIS 20-25: CPT

## 2022-06-13 PROCEDURE — 88311 DECALCIFY TISSUE: CPT | Performed by: INTERNAL MEDICINE

## 2022-06-13 PROCEDURE — 88237 TISSUE CULTURE BONE MARROW: CPT

## 2022-06-13 PROCEDURE — 88313 SPECIAL STAINS GROUP 2: CPT | Performed by: INTERNAL MEDICINE

## 2022-06-13 PROCEDURE — 88342 IMHCHEM/IMCYTCHM 1ST ANTB: CPT | Performed by: INTERNAL MEDICINE

## 2022-06-13 PROCEDURE — 88305 TISSUE EXAM BY PATHOLOGIST: CPT | Performed by: INTERNAL MEDICINE

## 2022-06-13 PROCEDURE — 88271 CYTOGENETICS DNA PROBE: CPT

## 2022-06-13 PROCEDURE — 88341 IMHCHEM/IMCYTCHM EA ADD ANTB: CPT | Performed by: INTERNAL MEDICINE

## 2022-06-13 PROCEDURE — 25010000002 MIDAZOLAM PER 1 MG: Performed by: RADIOLOGY

## 2022-06-13 PROCEDURE — 25010000002 FENTANYL CITRATE (PF) 50 MCG/ML SOLUTION: Performed by: RADIOLOGY

## 2022-06-13 PROCEDURE — 77012 CT SCAN FOR NEEDLE BIOPSY: CPT

## 2022-06-13 PROCEDURE — 85007 BL SMEAR W/DIFF WBC COUNT: CPT | Performed by: RADIOLOGY

## 2022-06-13 PROCEDURE — 85025 COMPLETE CBC W/AUTO DIFF WBC: CPT | Performed by: RADIOLOGY

## 2022-06-13 RX ORDER — FENTANYL CITRATE 50 UG/ML
INJECTION, SOLUTION INTRAMUSCULAR; INTRAVENOUS
Status: COMPLETED | OUTPATIENT
Start: 2022-06-13 | End: 2022-06-13

## 2022-06-13 RX ORDER — LIDOCAINE HYDROCHLORIDE 20 MG/ML
INJECTION, SOLUTION INFILTRATION; PERINEURAL
Status: DISPENSED
Start: 2022-06-13 | End: 2022-06-13

## 2022-06-13 RX ORDER — FENTANYL CITRATE 50 UG/ML
INJECTION, SOLUTION INTRAMUSCULAR; INTRAVENOUS
Status: DISPENSED
Start: 2022-06-13 | End: 2022-06-13

## 2022-06-13 RX ORDER — MIDAZOLAM HYDROCHLORIDE 1 MG/ML
INJECTION INTRAMUSCULAR; INTRAVENOUS
Status: COMPLETED | OUTPATIENT
Start: 2022-06-13 | End: 2022-06-13

## 2022-06-13 RX ORDER — MIDAZOLAM HYDROCHLORIDE 1 MG/ML
INJECTION INTRAMUSCULAR; INTRAVENOUS
Status: DISPENSED
Start: 2022-06-13 | End: 2022-06-13

## 2022-06-13 RX ADMIN — MIDAZOLAM HYDROCHLORIDE 1 MG: 1 INJECTION, SOLUTION INTRAMUSCULAR; INTRAVENOUS at 10:31

## 2022-06-13 RX ADMIN — FENTANYL CITRATE 50 MCG: 50 INJECTION, SOLUTION INTRAMUSCULAR; INTRAVENOUS at 10:33

## 2022-06-13 NOTE — NURSING NOTE
DC instructions reviewed with pt and son who both v/u. Advised pt that he can restart his ASA tonight and Coumadin tomorrow night. He v/u.  VSS. Site CDI. No c/o pain at present. Escorted to main entrance via wheelchair. Driven home in POV by son.

## 2022-06-13 NOTE — PROGRESS NOTES
Pt arrived to Rad holding via stretcher s/p CT guided Bone Marrow Biopsy. Site CDI. VSS. No c/o pain at present. Drink & snack provided. Son updated of status and approx DC time.

## 2022-06-13 NOTE — NURSING NOTE
Pt arrived to [facility] from [facility] without difficulty. A/O x3. VSS. No c/o pain at present.

## 2022-06-15 LAB — REF LAB TEST METHOD: NORMAL

## 2022-06-20 LAB — REF LAB TEST METHOD: NORMAL

## 2022-06-21 ENCOUNTER — TELEPHONE (OUTPATIENT)
Dept: ONCOLOGY | Facility: HOSPITAL | Age: 87
End: 2022-06-21

## 2022-06-21 NOTE — TELEPHONE ENCOUNTER
Caller: ORLANDO VALVERDE    Relationship: Emergency Contact    Best call back number: [phone]      What was the call regarding: PATIENTS DAUGHTER CALLED TO GET BIOPSY RESULTS    Do you require a callback: YES

## 2022-06-22 LAB
CYTO UR: NORMAL
DIFF PNL MAR: NORMAL
KARYOTYP MAR: NORMAL
LAB AP ASPIRATE SMEAR: NORMAL
LAB AP CASE REPORT: NORMAL
LAB AP CLINICAL INFORMATION: NORMAL
LAB AP CLOT SECTION: NORMAL
LAB AP CORE BIOPSY: NORMAL
LAB AP DIAGNOSIS COMMENT: NORMAL
LAB AP SPECIAL STAINS: NORMAL
PATH REPORT.FINAL DX SPEC: NORMAL
PATH REPORT.GROSS SPEC: NORMAL

## 2022-07-11 ENCOUNTER — OFFICE VISIT (OUTPATIENT)
Dept: ONCOLOGY | Facility: HOSPITAL | Age: 87
End: 2022-07-11

## 2022-07-11 VITALS
RESPIRATION RATE: 16 BRPM | WEIGHT: 147.05 LBS | BODY MASS INDEX: 21.72 KG/M2 | HEART RATE: 70 BPM | SYSTOLIC BLOOD PRESSURE: 106 MMHG | OXYGEN SATURATION: 97 % | TEMPERATURE: 96.8 F | DIASTOLIC BLOOD PRESSURE: 56 MMHG

## 2022-07-11 DIAGNOSIS — D47.2 MONOCLONAL GAMMOPATHY: Primary | ICD-10-CM

## 2022-07-11 DIAGNOSIS — D50.9 IRON DEFICIENCY ANEMIA, UNSPECIFIED IRON DEFICIENCY ANEMIA TYPE: ICD-10-CM

## 2022-07-11 PROCEDURE — G0463 HOSPITAL OUTPT CLINIC VISIT: HCPCS | Performed by: INTERNAL MEDICINE

## 2022-07-11 PROCEDURE — G0463 HOSPITAL OUTPT CLINIC VISIT: HCPCS

## 2022-07-11 PROCEDURE — 99214 OFFICE O/P EST MOD 30 MIN: CPT | Performed by: INTERNAL MEDICINE

## 2022-07-11 NOTE — PROGRESS NOTES
Chief Complaint  Results    Paco Daniel APRN Subjective          Arvind Coates presents to [facility] for follow-up of bone marrow evaluation performed for monoclonal gammopathy.  Patient notes the procedure went well.  He states he is in his usual health.  He denies unusual aches or pains.  No masses or adenopathy.  He reports good appetite.  His energy level is not as good as he would like but adequate for his ADLs.    Review of Systems   Constitutional: Positive for fatigue. Negative for appetite change, diaphoresis, fever, unexpected weight gain and unexpected weight loss.   HENT: Positive for hearing loss. Negative for sore throat and voice change.    Eyes: Negative for blurred vision, double vision, pain, redness and visual disturbance.   Respiratory: Negative for cough, shortness of breath and wheezing.    Cardiovascular: Negative for chest pain, palpitations and leg swelling.   Endocrine: Negative for cold intolerance, heat intolerance, polydipsia and polyuria.   Genitourinary: Negative for decreased urine volume, difficulty urinating, frequency and urinary incontinence.   Musculoskeletal: Negative for arthralgias, back pain, joint swelling and myalgias.   Skin: Negative for color change, rash, skin lesions and wound.   Neurological: Negative for dizziness, seizures, numbness and headache.   Hematological: Negative for adenopathy. Does not bruise/bleed easily.   Psychiatric/Behavioral: Negative for depressed mood. The patient is not nervous/anxious.      Current Outpatient Medications on File Prior to Visit   Medication Sig Dispense Refill   • albuterol sulfate  (90 Base) MCG/ACT inhaler      • aspirin 81 MG chewable tablet aspirin 81 mg oral tablet,chewable chew 1 tablet (81 mg) by oral route once daily   Active     • atorvastatin (LIPITOR) 40 MG tablet atorvastatin 40 mg oral tablet take 1 tablet (40 mg) by oral route once daily at bedtime   Active      • fluticasone (FLONASE) 50 MCG/ACT nasal spray SPRAY 2 SPRAYS IN EACH NOSTRIL BY INTRANASAL ROUTE ONCE DAILY AS NEEDED     • Ipratropium-Albuterol (ALBUTEROL-IPRATROPIUM IN) Duo Neb 3 ml inhalation Q 4 hours as needed   Active     • lisinopril (PRINIVIL,ZESTRIL) 10 MG tablet lisinopril 10 mg oral tablet take 1 tablet (10 mg) by oral route once daily   Active     • metFORMIN ER (GLUCOPHAGE-XR) 500 MG 24 hr tablet Take 1 tablet by mouth Daily With Breakfast. 90 tablet 1   • metoprolol succinate XL (TOPROL-XL) 25 MG 24 hr tablet metoprolol succinate 25 mg oral tablet extended release 24 hr take 1 tablet (25 mg) by oral route once daily for 30 days   Suspended     • O2 (OXYGEN) Inhale 2 L/min 1 (One) Time.     • polyethylene glycol (GoLYTELY) 236 g solution Starting at noon on day prior to procedure, drink 8 ounces every 30 minutes until all gone or stools are clear. May add flavor packet. 4000 mL 0   • warfarin (COUMADIN) 2 MG tablet warfarin 2 mg oral tablet take 1 tablet (2 mg) by oral route once daily   Active       No current facility-administered medications on file prior to visit.       No Known Allergies  Past Medical History:   Diagnosis Date   • Anemia    • Broken bones    • CHF (congestive heart failure) (HCC)    • COVID-19    • Hemorrhoids    • Hyperlipidemia    • Hypertension    • Pneumonia    • Shortness of breath      Past Surgical History:   Procedure Laterality Date   • APPENDECTOMY     • BONE MARROW BIOPSY     • COLONOSCOPY      2012?- normal per patient   • HEMORRHOIDECTOMY     • OTHER SURGICAL HISTORY      Triple Bi-Pass   • UPPER GASTROINTESTINAL ENDOSCOPY       Social History     Socioeconomic History   • Marital status: Single   Tobacco Use   • Smoking status: Passive Smoke Exposure - Never Smoker   • Smokeless tobacco: Never Used   Vaping Use   • Vaping Use: Never used   Substance and Sexual Activity   • Alcohol use: Never   • Drug use: Defer   • Sexual activity: Defer     Family History    Problem Relation Age of Onset   • Colon cancer Brother    • Prostate cancer Brother    • Diabetes Other        Objective   Physical Exam  Vitals reviewed. Exam conducted with a chaperone present.   Constitutional:       General: He is not in acute distress.     Appearance: Normal appearance.   Cardiovascular:      Rate and Rhythm: Normal rate and regular rhythm.      Heart sounds: Normal heart sounds. No murmur heard.    No gallop.   Pulmonary:      Effort: Pulmonary effort is normal.      Breath sounds: Normal breath sounds.   Abdominal:      General: Abdomen is flat. Bowel sounds are normal. There is no distension.      Palpations: Abdomen is soft.      Tenderness: There is no abdominal tenderness.   Musculoskeletal:      Cervical back: Neck supple.      Right lower leg: No edema.      Left lower leg: No edema.   Lymphadenopathy:      Cervical: No cervical adenopathy.   Neurological:      Mental Status: He is alert and oriented to person, place, and time.   Psychiatric:         Mood and Affect: Mood normal.         Behavior: Behavior normal.         Vitals:    07/11/22 1411   BP: 106/56   Pulse: 70   Resp: 16   Temp: 96.8 °F (36 °C)   SpO2: 97%   Weight: 66.7 kg (147 lb 0.8 oz)   PainSc: 0-No pain     ECOG score: 1         PHQ-9 Total Score:                      Result Review :   The following data was reviewed by: Molina Shen MD on 07/11/2022:  Lab Results   Component Value Date    HGB 12.8 (L) 06/13/2022    HCT 40.3 06/13/2022    MCV 91.2 06/13/2022     06/13/2022    WBC 6.17 06/13/2022    NEUTROABS 4.16 06/13/2022    LYMPHSABS 1.20 06/13/2022    MONOSABS 0.59 06/13/2022    EOSABS 0.15 06/13/2022    BASOSABS 0.05 06/13/2022     Lab Results   Component Value Date    GLUCOSE 82 06/02/2022    BUN 22 06/02/2022    CREATININE 1.30 (H) 06/02/2022     06/02/2022    K 4.3 06/02/2022     06/02/2022    CO2 26.5 06/02/2022    CALCIUM 9.2 06/02/2022    PROTEINTOT 7.1 06/02/2022    ALBUMIN 4.25  06/02/2022    BILITOT 1.6 (H) 06/02/2022    ALKPHOS 115 06/02/2022    AST 29 06/02/2022    ALT 23 06/02/2022     Lab Results   Component Value Date    MG 2.16 04/03/2021    PHOS 3.6 03/25/2021    TSH 2.830 10/08/2021     Bone marrow aspiration biopsy reviewed demonstrating monoclonal plasma cell population around 7%.  Flow cytometry confirms a monoclonal plasma cell population.  Myeloma FISH panel demonstrates gain of chromosome 9 and 15.  As well as extra signal for CCN D1/11 q.            Assessment and Plan    Diagnoses and all orders for this visit:    1. Monoclonal gammopathy (Primary)  Assessment & Plan:  Patient's bone marrow biopsy does demonstrate a clonal plasma cell population around 7% which does not meet criteria for overt myeloma.  He has no abnormal hemoglobin or calcium.  His creatinine is slightly elevated but stable.  I will order skeletal survey again-patient did not have that done.  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  We discussed that there is a 1 %/year possibility of progression to an overt plasma cell neoplasm such as myeloma but the recommendation would be for monitoring.  If myeloma lesions are noted on the skeletal survey, active therapy would be considered.    Orders:  -     CBC & Differential; Future  -     Comprehensive Metabolic Panel; Future  -     ROBERTO,PE and FLC, Serum; Future  -     ROBERTO & PE, Random Urine - Urine, Clean Catch; Future    2. Iron deficiency anemia, unspecified iron deficiency anemia type  Assessment & Plan:  Status post prior iron treatment.  Bone marrow biopsy shows 2+ iron stores.  Repeat CBC next visit.            Patient Follow Up: 6 months    Patient was given instructions and counseling regarding his condition or for health maintenance advice. Please see specific information pulled into the AVS if appropriate.     Molina Shen MD    7/12/2022

## 2022-07-12 ENCOUNTER — HOSPITAL ENCOUNTER (OUTPATIENT)
Dept: GENERAL RADIOLOGY | Facility: HOSPITAL | Age: 87
Discharge: HOME | End: 2022-07-12
Admitting: INTERNAL MEDICINE

## 2022-07-12 ENCOUNTER — OFFICE VISIT (OUTPATIENT)
Dept: FAMILY MEDICINE CLINIC | Facility: CLINIC | Age: 87
End: 2022-07-12

## 2022-07-12 VITALS
OXYGEN SATURATION: 100 % | HEIGHT: 69 IN | TEMPERATURE: 98 F | SYSTOLIC BLOOD PRESSURE: 124 MMHG | DIASTOLIC BLOOD PRESSURE: 66 MMHG | BODY MASS INDEX: 22.63 KG/M2 | HEART RATE: 72 BPM | WEIGHT: 152.8 LBS

## 2022-07-12 DIAGNOSIS — N18.31 STAGE 3A CHRONIC KIDNEY DISEASE: ICD-10-CM

## 2022-07-12 DIAGNOSIS — D47.2 MONOCLONAL GAMMOPATHY: ICD-10-CM

## 2022-07-12 DIAGNOSIS — N30.01 ACUTE CYSTITIS WITH HEMATURIA: Primary | ICD-10-CM

## 2022-07-12 DIAGNOSIS — J43.9 PULMONARY EMPHYSEMA, UNSPECIFIED EMPHYSEMA TYPE: ICD-10-CM

## 2022-07-12 PROBLEM — J18.9 PNEUMONIA OF BOTH LUNGS DUE TO INFECTIOUS ORGANISM: Status: RESOLVED | Noted: 2021-06-22 | Resolved: 2022-07-12

## 2022-07-12 PROBLEM — Z99.81 DEPENDENCE ON SUPPLEMENTAL OXYGEN: Status: RESOLVED | Noted: 2021-05-13 | Resolved: 2022-07-12

## 2022-07-12 LAB
ALBUMIN UR-MCNC: 52.2 MG/DL
BILIRUB BLD-MCNC: NEGATIVE MG/DL
CLARITY, POC: ABNORMAL
COLOR UR: YELLOW
GLUCOSE UR STRIP-MCNC: NEGATIVE MG/DL
KETONES UR QL: NEGATIVE
LEUKOCYTE EST, POC: ABNORMAL
NITRITE UR-MCNC: POSITIVE MG/ML
PH UR: 8.5 [PH] (ref 5–8)
PROT UR STRIP-MCNC: ABNORMAL MG/DL
RBC # UR STRIP: ABNORMAL /UL
SP GR UR: 1.02 (ref 1–1.03)
UROBILINOGEN UR QL: ABNORMAL

## 2022-07-12 PROCEDURE — 82043 UR ALBUMIN QUANTITATIVE: CPT | Performed by: NURSE PRACTITIONER

## 2022-07-12 PROCEDURE — 87186 SC STD MICRODIL/AGAR DIL: CPT | Performed by: NURSE PRACTITIONER

## 2022-07-12 PROCEDURE — 87077 CULTURE AEROBIC IDENTIFY: CPT | Performed by: NURSE PRACTITIONER

## 2022-07-12 PROCEDURE — 81002 URINALYSIS NONAUTO W/O SCOPE: CPT | Performed by: NURSE PRACTITIONER

## 2022-07-12 PROCEDURE — 99214 OFFICE O/P EST MOD 30 MIN: CPT | Performed by: NURSE PRACTITIONER

## 2022-07-12 PROCEDURE — 87086 URINE CULTURE/COLONY COUNT: CPT | Performed by: NURSE PRACTITIONER

## 2022-07-12 PROCEDURE — 77075 RADEX OSSEOUS SURVEY COMPL: CPT

## 2022-07-12 RX ORDER — NITROFURANTOIN 25; 75 MG/1; MG/1
100 CAPSULE ORAL 2 TIMES DAILY
Qty: 14 CAPSULE | Refills: 0 | Status: SHIPPED | OUTPATIENT
Start: 2022-07-12 | End: 2022-07-17 | Stop reason: CLARIF

## 2022-07-12 NOTE — ASSESSMENT & PLAN NOTE
Patient's bone marrow biopsy does demonstrate a clonal plasma cell population around 7% which does not meet criteria for overt myeloma.  He has no abnormal hemoglobin or calcium.  His creatinine is slightly elevated but stable.  I will order skeletal survey again-patient did not have that done.  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  We discussed that there is a 1 %/year possibility of progression to an overt plasma cell neoplasm such as myeloma but the recommendation would be for monitoring.  If myeloma lesions are noted on the skeletal survey, active therapy would be considered.

## 2022-07-12 NOTE — ASSESSMENT & PLAN NOTE
Status post prior iron treatment.  Bone marrow biopsy shows 2+ iron stores.  Repeat CBC next visit.

## 2022-07-12 NOTE — PROGRESS NOTES
"Chief Complaint  3 month follow up, Diabetes, and paperwork for oxygen    Subjective        Arvind Coates presents to [facility]  History of Present Illness  Presents today for 3-month follow-up on prediabetes, hyperlipidemia and hypertension.  Blood pressures well controlled.  Tolerating metformin well.  Denies polydipsia.  Patient does report over the past week he is having urinary frequency and dysuria.  Denies fever and chills.    He has been on oxygen over the past year since he had COVID-19.  He has oxygen saturations have improved.  He has not been using his oxygen over the past 3 weeks.  He reports his oxygen levels at home are between %.    Objective   Vital Signs:  /66 (BP Location: Left arm, Patient Position: Sitting)   Pulse 72   Temp 98 °F (36.7 °C)   Ht 175.3 cm (69\")   Wt 69.3 kg (152 lb 12.8 oz)   SpO2 100%   BMI 22.56 kg/m²   Estimated body mass index is 22.56 kg/m² as calculated from the following:    Height as of this encounter: 175.3 cm (69\").    Weight as of this encounter: 69.3 kg (152 lb 12.8 oz).    BMI is within normal parameters. No other follow-up for BMI required.      Physical Exam  Vitals reviewed.   Constitutional:       Appearance: Normal appearance. He is well-developed.   HENT:      Head: Normocephalic and atraumatic.      Right Ear: External ear normal.      Left Ear: External ear normal.      Mouth/Throat:      Pharynx: No oropharyngeal exudate.   Eyes:      Conjunctiva/sclera: Conjunctivae normal.      Pupils: Pupils are equal, round, and reactive to light.   Cardiovascular:      Rate and Rhythm: Normal rate and regular rhythm.      Heart sounds: No murmur heard.    No friction rub. No gallop.   Pulmonary:      Effort: Pulmonary effort is normal.      Breath sounds: Normal breath sounds. No wheezing or rhonchi.   Abdominal:      General: Bowel sounds are normal. There is no distension.      Palpations: Abdomen is soft.      " Tenderness: There is no abdominal tenderness.   Skin:     General: Skin is warm and dry.   Neurological:      Mental Status: He is alert and oriented to person, place, and time.        Result Review :                Assessment and Plan   Diagnoses and all orders for this visit:    1. Acute cystitis with hematuria (Primary)  -     POCT urinalysis dipstick, manual  -     Urine Culture - Urine, Urine, Clean Catch  -     nitrofurantoin, macrocrystal-monohydrate, (Macrobid) 100 MG capsule; Take 1 capsule by mouth 2 (Two) Times a Day.  Dispense: 14 capsule; Refill: 0    2. Stage 3a chronic kidney disease (HCC)  -     MicroAlbumin, Urine, Random - Urine, Clean Catch    3. Pulmonary emphysema, unspecified emphysema type (HCC)    Will order Macrobid twice daily over the next week.  Also check urine microalbumin.  For his pulmonary emphysema and history of being on oxygen we will order a pulse oximetry overnight on room air to verify if patient needs oxygen at night.         Follow Up   Return in about 3 months (around 10/12/2022), or if symptoms worsen or fail to improve, for Medicare Wellness.  Patient was given instructions and counseling regarding his condition or for health maintenance advice. Please see specific information pulled into the AVS if appropriate.

## 2022-07-14 LAB — BACTERIA SPEC AEROBE CULT: ABNORMAL

## 2022-07-15 ENCOUNTER — TELEPHONE (OUTPATIENT)
Dept: FAMILY MEDICINE CLINIC | Facility: CLINIC | Age: 87
End: 2022-07-15

## 2022-07-15 NOTE — TELEPHONE ENCOUNTER
Caller: JOSE ARMANDO    Relationship: Other    Best call back number: [phone]    What is the best time to reach you: ANYTIME UNTIL 5PM    Who are you requesting to speak with (clinical staff, provider,  specific staff member): CLINICAL    What was the call regarding:   JOSE ARMANDO FROM [facility] STATED SHE SENT ORDERS FOR CONTINUATION OF OXYGEN IN MAY, June, AND July AND HAS STILL NOT RECEIVED A RESPONSE. SHE WOULD LIKE A CALL BACK TO DISCUSS ASAP    Do you require a callback: YES

## 2022-07-17 DIAGNOSIS — N30.01 ACUTE CYSTITIS WITH HEMATURIA: Primary | ICD-10-CM

## 2022-07-17 RX ORDER — CEPHALEXIN 500 MG/1
500 CAPSULE ORAL 3 TIMES DAILY
Qty: 21 CAPSULE | Refills: 0 | Status: SHIPPED | OUTPATIENT
Start: 2022-07-17 | End: 2022-07-24

## 2022-07-18 NOTE — TELEPHONE ENCOUNTER
Spoke to jo-ann getting it faxed over again today and told jo-ann I would get it signed and faxed over on the 7/19 due to provider not being in office today.

## 2022-07-19 ENCOUNTER — TELEPHONE (OUTPATIENT)
Dept: FAMILY MEDICINE CLINIC | Facility: CLINIC | Age: 87
End: 2022-07-19

## 2022-07-19 ENCOUNTER — TELEPHONE (OUTPATIENT)
Dept: GASTROENTEROLOGY | Facility: CLINIC | Age: 87
End: 2022-07-19

## 2022-07-19 NOTE — TELEPHONE ENCOUNTER
Spoke to Mr. Kan Daughter about him stopping his warfarin 4 days prior to his scope in Aug. She stated her understanding and would have him stop his medication on time. Kat

## 2022-08-01 ENCOUNTER — TELEPHONE (OUTPATIENT)
Dept: FAMILY MEDICINE CLINIC | Facility: CLINIC | Age: 87
End: 2022-08-01

## 2022-08-01 NOTE — TELEPHONE ENCOUNTER
Caller: JOSE ARMANDO    Relationship to patient: [employer]     Best call back number: [phone]    Patient is needing: JOSE ARMANDO STATED THAT THEY ARE NEEDING TO HAVE CERTIFICATE OF MEDICAL NECESSITY COMPLETED FOR PATIENT'S OXYGEN. SHE SAID THAT THIS HAS BEEN SENT WITHOUT RESPONSE ON 5/23/22, 6/10/22 AND 7/18/22. REQUESTING THIS BE COMPLETED AND FAXED BACK [phone].

## 2022-08-08 DIAGNOSIS — E11.65 TYPE 2 DIABETES MELLITUS WITH HYPERGLYCEMIA, WITHOUT LONG-TERM CURRENT USE OF INSULIN: ICD-10-CM

## 2022-08-08 NOTE — TELEPHONE ENCOUNTER
Caller: [id number] - [place], [place] - [address] AT [facility] [phone] [facility] [phone] FX    Relationship: Pharmacy    Best call back number: [phone]     Requested Prescriptions:   Requested Prescriptions     Pending Prescriptions Disp Refills   • metFORMIN ER (GLUCOPHAGE-XR) 500 MG 24 hr tablet 90 tablet 1     Sig: Take 1 tablet by mouth Daily With Breakfast.        Pharmacy where request should be sent: [id number] - [place], [place] - [address] AT [facility] [address] - [phone] [facility] [phone] FX     Additional details provided by patient: PATIENT IS NEEDING A REFILL. HE CALLED THE PHARMACY AND TOLD THEM HE ONLY HAS 2 PILLS LEFT. PLEASE CALL AND ADVISE.     Does the patient have less than a 3 day supply:  [x] Yes  [] No    Amy Bal Rep   08/08/22 11:47 EDT

## 2022-08-09 RX ORDER — METFORMIN HYDROCHLORIDE 500 MG/1
500 TABLET, EXTENDED RELEASE ORAL
Qty: 90 TABLET | Refills: 1 | Status: SHIPPED | OUTPATIENT
Start: 2022-08-09 | End: 2022-08-11 | Stop reason: SDUPTHER

## 2022-08-10 ENCOUNTER — HOSPITAL ENCOUNTER (OUTPATIENT)
Facility: HOSPITAL | Age: 87
Setting detail: HOSPITAL OUTPATIENT SURGERY
Discharge: HOME | End: 2022-08-10
Attending: INTERNAL MEDICINE | Admitting: INTERNAL MEDICINE

## 2022-08-10 ENCOUNTER — ANESTHESIA EVENT (OUTPATIENT)
Dept: GASTROENTEROLOGY | Facility: HOSPITAL | Age: 87
End: 2022-08-10

## 2022-08-10 ENCOUNTER — ANESTHESIA (OUTPATIENT)
Dept: GASTROENTEROLOGY | Facility: HOSPITAL | Age: 87
End: 2022-08-10

## 2022-08-10 VITALS
OXYGEN SATURATION: 97 % | SYSTOLIC BLOOD PRESSURE: 132 MMHG | WEIGHT: 151.24 LBS | HEART RATE: 69 BPM | BODY MASS INDEX: 22.33 KG/M2 | DIASTOLIC BLOOD PRESSURE: 74 MMHG | TEMPERATURE: 97 F | RESPIRATION RATE: 13 BRPM

## 2022-08-10 DIAGNOSIS — R63.4 WEIGHT LOSS: ICD-10-CM

## 2022-08-10 DIAGNOSIS — D50.9 IRON DEFICIENCY ANEMIA, UNSPECIFIED IRON DEFICIENCY ANEMIA TYPE: ICD-10-CM

## 2022-08-10 DIAGNOSIS — R10.30 LOWER ABDOMINAL PAIN: ICD-10-CM

## 2022-08-10 DIAGNOSIS — R19.5 POSITIVE OCCULT STOOL BLOOD TEST: ICD-10-CM

## 2022-08-10 DIAGNOSIS — K64.9 HEMORRHOIDS, UNSPECIFIED HEMORRHOID TYPE: ICD-10-CM

## 2022-08-10 DIAGNOSIS — I48.91 ATRIAL FIBRILLATION, UNSPECIFIED TYPE: ICD-10-CM

## 2022-08-10 LAB
INR PPP: 1.38 (ref 0.86–1.15)
PROTHROMBIN TIME: 17.2 SECONDS (ref 11.8–14.9)

## 2022-08-10 PROCEDURE — 43239 EGD BIOPSY SINGLE/MULTIPLE: CPT | Performed by: INTERNAL MEDICINE

## 2022-08-10 PROCEDURE — 85610 PROTHROMBIN TIME: CPT | Performed by: INTERNAL MEDICINE

## 2022-08-10 PROCEDURE — 45390 COLONOSCOPY W/RESECTION: CPT | Performed by: INTERNAL MEDICINE

## 2022-08-10 PROCEDURE — 45385 COLONOSCOPY W/LESION REMOVAL: CPT | Performed by: INTERNAL MEDICINE

## 2022-08-10 PROCEDURE — 88305 TISSUE EXAM BY PATHOLOGIST: CPT | Performed by: INTERNAL MEDICINE

## 2022-08-10 PROCEDURE — 25010000002 PROPOFOL 10 MG/ML EMULSION: Performed by: NURSE ANESTHETIST, CERTIFIED REGISTERED

## 2022-08-10 DEVICE — DEV CLIP HEMO RESOLUTION360/ULTR 235CM 17MM STRL: Type: IMPLANTABLE DEVICE | Site: COLON | Status: FUNCTIONAL

## 2022-08-10 DEVICE — DEV CLIP ENDO RESOLUTION360 CONTRL ROT 235CM: Type: IMPLANTABLE DEVICE | Site: COLON | Status: FUNCTIONAL

## 2022-08-10 RX ORDER — PROPOFOL 10 MG/ML
VIAL (ML) INTRAVENOUS CONTINUOUS PRN
Status: DISCONTINUED | OUTPATIENT
Start: 2022-08-10 | End: 2022-08-10 | Stop reason: SURG

## 2022-08-10 RX ORDER — OMEPRAZOLE 40 MG/1
40 CAPSULE, DELAYED RELEASE ORAL DAILY
Qty: 30 CAPSULE | Refills: 3 | Status: SHIPPED | OUTPATIENT
Start: 2022-08-10 | End: 2022-12-30

## 2022-08-10 RX ORDER — SODIUM CHLORIDE, SODIUM LACTATE, POTASSIUM CHLORIDE, CALCIUM CHLORIDE 600; 310; 30; 20 MG/100ML; MG/100ML; MG/100ML; MG/100ML
30 INJECTION, SOLUTION INTRAVENOUS CONTINUOUS
Status: DISCONTINUED | OUTPATIENT
Start: 2022-08-10 | End: 2022-08-10 | Stop reason: HOSPADM

## 2022-08-10 RX ORDER — LIDOCAINE HYDROCHLORIDE 20 MG/ML
INJECTION, SOLUTION EPIDURAL; INFILTRATION; INTRACAUDAL; PERINEURAL AS NEEDED
Status: DISCONTINUED | OUTPATIENT
Start: 2022-08-10 | End: 2022-08-10 | Stop reason: SURG

## 2022-08-10 RX ADMIN — PROPOFOL 50 MG: 10 INJECTION, EMULSION INTRAVENOUS at 13:04

## 2022-08-10 RX ADMIN — PROPOFOL 50 MCG/KG/MIN: 10 INJECTION, EMULSION INTRAVENOUS at 13:04

## 2022-08-10 RX ADMIN — LIDOCAINE HYDROCHLORIDE 30 MG: 20 INJECTION, SOLUTION EPIDURAL; INFILTRATION; INTRACAUDAL; PERINEURAL at 13:04

## 2022-08-10 RX ADMIN — SODIUM CHLORIDE, POTASSIUM CHLORIDE, SODIUM LACTATE AND CALCIUM CHLORIDE 30 ML/HR: 600; 310; 30; 20 INJECTION, SOLUTION INTRAVENOUS at 12:05

## 2022-08-10 NOTE — ANESTHESIA PREPROCEDURE EVALUATION
Anesthesia Evaluation     Patient summary reviewed and Nursing notes reviewed                Airway   Mallampati: I  TM distance: >3 FB  Neck ROM: full  No difficulty expected  Dental    (+) poor dentition    Pulmonary - normal exam    breath sounds clear to auscultation  (+) shortness of breath,   Cardiovascular     Rhythm: irregular  Rate: normal    (+) hypertension, CAD, CABG, dysrhythmias Atrial Fib, CHF , hyperlipidemia,       Neuro/Psych- negative ROS  GI/Hepatic/Renal/Endo    (+)   renal disease,     Musculoskeletal (-) negative ROS    Abdominal    Substance History - negative use     OB/GYN negative ob/gyn ROS         Other                      Anesthesia Plan    ASA 4     general     intravenous induction     Anesthetic plan, risks, benefits, and alternatives have been provided, discussed and informed consent has been obtained with: patient.        CODE STATUS:

## 2022-08-10 NOTE — H&P
Pre Procedure History & Physical    Chief Complaint:   Iron def anemia      Subjective     HPI:   Iron def anemia    Past Medical History:   Past Medical History:   Diagnosis Date   • Anemia    • Bleeding     Bleeding issues post 1  colonoscopy and during 1 prep- caused to pass out   • Broken bones    • CHF (congestive heart failure) (HCC)    • COVID-19     3/2021   • Hemorrhoids    • Hyperlipidemia    • Hypertension    • Pneumonia    • Pre-diabetes    • Shortness of breath        Past Surgical History:  Past Surgical History:   Procedure Laterality Date   • APPENDECTOMY     • BONE MARROW BIOPSY     • COLONOSCOPY      2012?- normal per patient   • CORONARY ARTERY BYPASS GRAFT      triple   • HEMORRHOIDECTOMY     • UPPER GASTROINTESTINAL ENDOSCOPY         Family History:  Family History   Problem Relation Age of Onset   • Colon cancer Brother    • Prostate cancer Brother    • Diabetes Other    • Malig Hyperthermia Neg Hx        Social History:   reports that he is a non-smoker but has been exposed to tobacco smoke. He has never used smokeless tobacco. He reports that he does not drink alcohol and does not use drugs.    Medications:   Medications Prior to Admission   Medication Sig Dispense Refill Last Dose   • albuterol sulfate  (90 Base) MCG/ACT inhaler Inhale 2 puffs Every 6 (Six) Hours As Needed.   Past Month at Unknown time   • aspirin 81 MG chewable tablet aspirin 81 mg oral tablet,chewable chew 1 tablet (81 mg) by oral route once daily   Active   8/8/2022   • atorvastatin (LIPITOR) 40 MG tablet atorvastatin 40 mg oral tablet take 1 tablet (40 mg) by oral route once daily at bedtime   Active   8/9/2022   • fluticasone (FLONASE) 50 MCG/ACT nasal spray SPRAY 2 SPRAYS IN EACH NOSTRIL BY INTRANASAL ROUTE ONCE DAILY AS NEEDED   8/9/2022 at Unknown time   • Ipratropium-Albuterol (ALBUTEROL-IPRATROPIUM IN) Duo Neb 3 ml inhalation Q 4 hours as needed   Active   Past Month at Unknown time   • lisinopril  (PRINIVIL,ZESTRIL) 10 MG tablet lisinopril 10 mg oral tablet take 1 tablet (10 mg) by oral route once daily   Active   8/9/2022 at Unknown time   • metFORMIN ER (GLUCOPHAGE-XR) 500 MG 24 hr tablet Take 1 tablet by mouth Daily With Breakfast. 90 tablet 1 8/9/2022 at Unknown time   • metoprolol succinate XL (TOPROL-XL) 25 MG 24 hr tablet metoprolol succinate 25 mg oral tablet extended release 24 hr take 1 tablet (25 mg) by oral route once daily for 30 days   Suspended   8/9/2022 at Unknown time   • warfarin (COUMADIN) 2 MG tablet Take 2 mg by mouth Every Night.   8/5/2022       Allergies:  Patient has no known allergies.        Objective     Blood pressure 141/86, pulse 69, temperature 97.8 °F (36.6 °C), temperature source Temporal, resp. rate 18, weight 68.6 kg (151 lb 3.8 oz), SpO2 98 %.    Physical Exam   Constitutional: Pt is oriented to person, place, and time and well-developed, well-nourished, and in no distress.   Mouth/Throat: Oropharynx is clear and moist.   Neck: Normal range of motion.   Cardiovascular: Normal rate, regular rhythm and normal heart sounds.    Pulmonary/Chest: Effort normal and breath sounds normal.   Abdominal: Soft. Nontender  Skin: Skin is warm and dry.   Psychiatric: Mood, memory, affect and judgment normal.     Assessment & Plan     Diagnosis:  Iron def anemia    Anticipated Surgical Procedure:  egd  Colonoscopy      The risks, benefits, and alternatives of this procedure have been discussed with the patient or the responsible party- the patient understands and agrees to proceed.

## 2022-08-10 NOTE — ANESTHESIA POSTPROCEDURE EVALUATION
Patient: Arvind Coates    Procedure Summary     Date: 08/10/22 Room / Location: [facility] 3 / [facility]    Anesthesia Start: 1300 Anesthesia Stop: 1356    Procedures:       ESOPHAGOGASTRODUODENOSCOPY WITH BIOPSIES (N/A )      COLONOSCOPY WITH ORISE INJECTION/POLYPECTOMY /SNARE/CLIP APPLICATION X9 (N/A ) Diagnosis:       Atrial fibrillation, unspecified type (HCC)      Hemorrhoids, unspecified hemorrhoid type      Iron deficiency anemia, unspecified iron deficiency anemia type      Weight loss      Positive occult stool blood test      Lower abdominal pain      (Atrial fibrillation, unspecified type (HCC) [I48.91])      (Hemorrhoids, unspecified hemorrhoid type [K64.9])      (Iron deficiency anemia, unspecified iron deficiency anemia type [D50.9])      (Weight loss [R63.4])      (Positive occult stool blood test [R19.5])      (Lower abdominal pain [R10.30])    Surgeons: Steven Holm MD Provider: Devang Tolentino MD    Anesthesia Type: general ASA Status: 4          Anesthesia Type: general    Vitals  Vitals Value Taken Time   /74 08/10/22 1415   Temp 36.1 °C (97 °F) 08/10/22 1415   Pulse 69 08/10/22 1415   Resp 13 08/10/22 1415   SpO2 97 % 08/10/22 1415           Post Anesthesia Care and Evaluation    Patient location during evaluation: bedside  Patient participation: complete - patient participated  Level of consciousness: awake  Pain management: adequate    Airway patency: patent  Anesthetic complications: No anesthetic complications  PONV Status: none  Cardiovascular status: acceptable  Respiratory status: acceptable  Hydration status: acceptable    Comments: An Anesthesiologist personally participated in the most demanding procedures (including induction and emergence if applicable) in the anesthesia plan, monitored the course of anesthesia administration at frequent intervals and remained physically present and available for immediate diagnosis and treatment of  emergencies.

## 2022-08-11 DIAGNOSIS — E11.65 TYPE 2 DIABETES MELLITUS WITH HYPERGLYCEMIA, WITHOUT LONG-TERM CURRENT USE OF INSULIN: ICD-10-CM

## 2022-08-11 RX ORDER — METFORMIN HYDROCHLORIDE 500 MG/1
500 TABLET, EXTENDED RELEASE ORAL
Qty: 90 TABLET | Refills: 1 | Status: SHIPPED | OUTPATIENT
Start: 2022-08-11

## 2022-08-12 ENCOUNTER — TELEPHONE (OUTPATIENT)
Dept: GASTROENTEROLOGY | Facility: CLINIC | Age: 87
End: 2022-08-12

## 2022-08-12 LAB
CYTO UR: NORMAL
LAB AP CASE REPORT: NORMAL
LAB AP CLINICAL INFORMATION: NORMAL
PATH REPORT.FINAL DX SPEC: NORMAL
PATH REPORT.GROSS SPEC: NORMAL

## 2022-08-12 NOTE — TELEPHONE ENCOUNTER
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  Repeat colonoscopy not recommended for surveillance as the patient is 86.    I have reviewed upper endoscopy. Negative results for H. Pylori, metaplasia, dysplasia and malignancy.

## 2022-08-12 NOTE — TELEPHONE ENCOUNTER
Per DEB, spoke to pts daughter Kari Pineda and informed of Jada BAKER result note and recommendations. Pt has established apt with Josefina Jackson on 11/10/2022. Verified understanding.

## 2022-08-18 ENCOUNTER — TELEPHONE (OUTPATIENT)
Dept: FAMILY MEDICINE CLINIC | Facility: CLINIC | Age: 87
End: 2022-08-18

## 2022-08-18 NOTE — TELEPHONE ENCOUNTER
Received a message from the  that [facility] has been waiting on an oxygen order for the patient. I faxed the order on 07/27/2022 to the fax number listed on the order form. I called the [place] office and was told it was the wrong office and gave me the phone number [phone]. I called that number and she stated it should have gone to the other office but they have been having problems with the fax. Casie stated I could refax it directly to her at [phone]. I sent the fax to her for the patient.

## 2022-10-12 ENCOUNTER — TELEPHONE (OUTPATIENT)
Dept: ONCOLOGY | Facility: HOSPITAL | Age: 87
End: 2022-10-12

## 2022-10-12 ENCOUNTER — LAB (OUTPATIENT)
Dept: ONCOLOGY | Facility: HOSPITAL | Age: 87
End: 2022-10-12

## 2022-10-12 ENCOUNTER — OFFICE VISIT (OUTPATIENT)
Dept: FAMILY MEDICINE CLINIC | Facility: CLINIC | Age: 87
End: 2022-10-12

## 2022-10-12 VITALS
DIASTOLIC BLOOD PRESSURE: 74 MMHG | SYSTOLIC BLOOD PRESSURE: 144 MMHG | HEIGHT: 69 IN | OXYGEN SATURATION: 100 % | WEIGHT: 160.2 LBS | HEART RATE: 60 BPM | BODY MASS INDEX: 23.73 KG/M2 | TEMPERATURE: 98 F

## 2022-10-12 DIAGNOSIS — Z23 NEED FOR INFLUENZA VACCINATION: ICD-10-CM

## 2022-10-12 DIAGNOSIS — Z00.00 MEDICARE ANNUAL WELLNESS VISIT, SUBSEQUENT: Primary | ICD-10-CM

## 2022-10-12 DIAGNOSIS — D47.2 MONOCLONAL PARAPROTEINEMIA: ICD-10-CM

## 2022-10-12 DIAGNOSIS — D47.2 MONOCLONAL GAMMOPATHY: ICD-10-CM

## 2022-10-12 DIAGNOSIS — I50.9 CHRONIC CONGESTIVE HEART FAILURE, UNSPECIFIED HEART FAILURE TYPE: ICD-10-CM

## 2022-10-12 DIAGNOSIS — I48.91 ATRIAL FIBRILLATION, UNSPECIFIED TYPE: ICD-10-CM

## 2022-10-12 DIAGNOSIS — E11.9 TYPE 2 DIABETES MELLITUS WITHOUT COMPLICATION, WITHOUT LONG-TERM CURRENT USE OF INSULIN: ICD-10-CM

## 2022-10-12 LAB
BASOPHILS # BLD AUTO: 0.04 10*3/MM3 (ref 0–0.2)
BASOPHILS NFR BLD AUTO: 0.6 % (ref 0–1.5)
DEPRECATED RDW RBC AUTO: 51.8 FL (ref 37–54)
EOSINOPHIL # BLD AUTO: 0.15 10*3/MM3 (ref 0–0.4)
EOSINOPHIL NFR BLD AUTO: 2.4 % (ref 0.3–6.2)
ERYTHROCYTE [DISTWIDTH] IN BLOOD BY AUTOMATED COUNT: 13.5 % (ref 12.3–15.4)
HBA1C MFR BLD: 5.7 % (ref 4.8–5.6)
HCT VFR BLD AUTO: 37.5 % (ref 37.5–51)
HGB BLD-MCNC: 12.3 G/DL (ref 13–17.7)
IMM GRANULOCYTES # BLD AUTO: 0.01 10*3/MM3 (ref 0–0.05)
IMM GRANULOCYTES NFR BLD AUTO: 0.2 % (ref 0–0.5)
LYMPHOCYTES # BLD AUTO: 1.72 10*3/MM3 (ref 0.7–3.1)
LYMPHOCYTES NFR BLD AUTO: 27.7 % (ref 19.6–45.3)
MCH RBC QN AUTO: 33.5 PG (ref 26.6–33)
MCHC RBC AUTO-ENTMCNC: 32.8 G/DL (ref 31.5–35.7)
MCV RBC AUTO: 102.2 FL (ref 79–97)
MONOCYTES # BLD AUTO: 0.72 10*3/MM3 (ref 0.1–0.9)
MONOCYTES NFR BLD AUTO: 11.6 % (ref 5–12)
NEUTROPHILS NFR BLD AUTO: 3.57 10*3/MM3 (ref 1.7–7)
NEUTROPHILS NFR BLD AUTO: 57.5 % (ref 42.7–76)
NRBC BLD AUTO-RTO: 0 /100 WBC (ref 0–0.2)
PLATELET # BLD AUTO: 144 10*3/MM3 (ref 140–450)
PMV BLD AUTO: 11 FL (ref 6–12)
RBC # BLD AUTO: 3.67 10*6/MM3 (ref 4.14–5.8)
WBC NRBC COR # BLD: 6.21 10*3/MM3 (ref 3.4–10.8)

## 2022-10-12 PROCEDURE — 90662 IIV NO PRSV INCREASED AG IM: CPT | Performed by: NURSE PRACTITIONER

## 2022-10-12 PROCEDURE — G0439 PPPS, SUBSEQ VISIT: HCPCS | Performed by: NURSE PRACTITIONER

## 2022-10-12 PROCEDURE — 1170F FXNL STATUS ASSESSED: CPT | Performed by: NURSE PRACTITIONER

## 2022-10-12 PROCEDURE — 85025 COMPLETE CBC W/AUTO DIFF WBC: CPT | Performed by: INTERNAL MEDICINE

## 2022-10-12 PROCEDURE — 83036 HEMOGLOBIN GLYCOSYLATED A1C: CPT | Performed by: NURSE PRACTITIONER

## 2022-10-12 PROCEDURE — G0008 ADMIN INFLUENZA VIRUS VAC: HCPCS | Performed by: NURSE PRACTITIONER

## 2022-10-12 PROCEDURE — 1159F MED LIST DOCD IN RCRD: CPT | Performed by: NURSE PRACTITIONER

## 2022-10-12 PROCEDURE — 1126F AMNT PAIN NOTED NONE PRSNT: CPT | Performed by: NURSE PRACTITIONER

## 2022-10-12 RX ORDER — METOPROLOL SUCCINATE 50 MG/1
75 TABLET, EXTENDED RELEASE ORAL DAILY
COMMUNITY
Start: 2022-07-27

## 2022-10-12 NOTE — PROGRESS NOTES
The ABCs of the Annual Wellness Visit  Subsequent Medicare Wellness Visit    Chief Complaint   Patient presents with   • Medicare Wellness-subsequent   • Diabetes      Subjective    History of Present Illness:  Arvind Coates is a 87 y.o. male who presents for a Subsequent Medicare Wellness Visit.    The following portions of the patient's history were reviewed and   updated as appropriate: allergies, current medications, past family history, past medical history, past social history, past surgical history and problem list.    Compared to one year ago, the patient feels his physical   health is better.    Compared to one year ago, the patient feels his mental   health is better.    Recent Hospitalizations:  He was not admitted to the hospital during the last year.       Current Medical Providers:  Patient Care Team:  Paco Daniel APRN as PCP - General (Nurse Practitioner)  Molina Shen MD as Consulting Physician (Hematology and Oncology)    Outpatient Medications Prior to Visit   Medication Sig Dispense Refill   • albuterol sulfate  (90 Base) MCG/ACT inhaler Inhale 2 puffs Every 6 (Six) Hours As Needed.     • aspirin 81 MG chewable tablet aspirin 81 mg oral tablet,chewable chew 1 tablet (81 mg) by oral route once daily   Active     • atorvastatin (LIPITOR) 40 MG tablet atorvastatin 40 mg oral tablet take 1 tablet (40 mg) by oral route once daily at bedtime   Active     • fluticasone (FLONASE) 50 MCG/ACT nasal spray SPRAY 2 SPRAYS IN EACH NOSTRIL BY INTRANASAL ROUTE ONCE DAILY AS NEEDED     • Ipratropium-Albuterol (ALBUTEROL-IPRATROPIUM IN) Duo Neb 3 ml inhalation Q 4 hours as needed   Active     • lisinopril (PRINIVIL,ZESTRIL) 10 MG tablet lisinopril 10 mg oral tablet take 1 tablet (10 mg) by oral route once daily   Active     • metFORMIN ER (GLUCOPHAGE-XR) 500 MG 24 hr tablet Take 1 tablet by mouth Daily With Breakfast. 90 tablet 1   • metoprolol succinate XL (TOPROL-XL) 25 MG 24 hr tablet  metoprolol succinate 25 mg oral tablet extended release 24 hr take 1 tablet (25 mg) by oral route once daily for 30 days   Suspended     • metoprolol succinate XL (TOPROL-XL) 50 MG 24 hr tablet Take 1.5 tablets by mouth Daily.     • omeprazole (priLOSEC) 40 MG capsule Take 1 capsule by mouth Daily. 30 capsule 3   • warfarin (COUMADIN) 2 MG tablet Take 2 mg by mouth Every Night.       No facility-administered medications prior to visit.       No opioid medication identified on active medication list. I have reviewed chart for other potential  high risk medication/s and harmful drug interactions in the elderly.          Aspirin is on active medication list. Aspirin use is indicated based on review of current medical condition/s. Pros and cons of this therapy have been discussed today. Benefits of this medication outweigh potential harm.  Patient has been encouraged to continue taking this medication.  .      Patient Active Problem List   Diagnosis   • Atrial fibrillation (HCC)   • Cardiac pacemaker in situ   • CHF (congestive heart failure) (HCC)   • Coronary atherosclerosis   • Hemorrhoids   • History of aortic valve replacement   • Hyperlipidemia   • Hypertension   • Personal history of other drug therapy   • Presence of aortocoronary bypass graft   • Sick sinus syndrome (HCC)   • Supraventricular tachycardia (HCC)   • Stage 3a chronic kidney disease (HCC)   • Other specified anemias   • Malabsorption due to intolerance, not elsewhere classified   • Iron deficiency anemia   • Weight loss   • Positive occult stool blood test   • Lower abdominal pain   • Monoclonal gammopathy     Advance Care Planning  Advance Directive is not on file.  ACP discussion was held with the patient during this visit. Patient does not have an advance directive, information provided.          Objective    Vitals:    10/12/22 1118   BP: 144/74   BP Location: Right arm   Patient Position: Sitting   Pulse: 60   Temp: 98 °F (36.7 °C)   TempSrc:  "Oral   SpO2: 100%   Weight: 72.7 kg (160 lb 3.2 oz)   Height: 175.3 cm (69\")   PainSc: 0-No pain     Estimated body mass index is 23.66 kg/m² as calculated from the following:    Height as of this encounter: 175.3 cm (69\").    Weight as of this encounter: 72.7 kg (160 lb 3.2 oz).    BMI is within normal parameters. No other follow-up for BMI required.      Does the patient have evidence of cognitive impairment? No    Physical Exam  Vitals reviewed.   Constitutional:       Appearance: Normal appearance. He is well-developed.   HENT:      Head: Normocephalic and atraumatic.      Right Ear: External ear normal.      Left Ear: External ear normal.      Mouth/Throat:      Pharynx: No oropharyngeal exudate.   Eyes:      Conjunctiva/sclera: Conjunctivae normal.      Pupils: Pupils are equal, round, and reactive to light.   Cardiovascular:      Rate and Rhythm: Normal rate and regular rhythm.      Heart sounds: No murmur heard.    No friction rub. No gallop.   Pulmonary:      Effort: Pulmonary effort is normal.      Breath sounds: Normal breath sounds. No wheezing or rhonchi.   Abdominal:      General: Bowel sounds are normal. There is no distension.      Palpations: Abdomen is soft.      Tenderness: There is no abdominal tenderness.   Skin:     General: Skin is warm and dry.   Neurological:      Mental Status: He is alert and oriented to person, place, and time.   Psychiatric:         Mood and Affect: Mood and affect normal.         Behavior: Behavior normal.         Thought Content: Thought content normal.         Judgment: Judgment normal.                 HEALTH RISK ASSESSMENT    Smoking Status:  Social History     Tobacco Use   Smoking Status Passive Smoke Exposure - Never Smoker   Smokeless Tobacco Never     Alcohol Consumption:  Social History     Substance and Sexual Activity   Alcohol Use Never     Fall Risk Screen:    STEADI Fall Risk Assessment was completed, and patient is at LOW risk for falls.Assessment " completed on:10/12/2022    Depression Screening:  PHQ-2/PHQ-9 Depression Screening 10/12/2022   Retired PHQ-9 Total Score -   Retired Total Score -   Little Interest or Pleasure in Doing Things 0-->not at all   Feeling Down, Depressed or Hopeless 0-->not at all   Trouble Falling or Staying Asleep, or Sleeping Too Much -   Feeling Tired or Having Little Energy -   Poor Appetite or Overeating -   Feeling Bad about Yourself - or that You are a Failure or Have Let Yourself or Your Family Down -   Trouble Concentrating on Things, Such as Reading the Newspaper or Watching Television -   Moving or Speaking So Slowly that Other People Could Have Noticed? Or the Opposite - Being So Fidgety -   Thoughts that You Would be Better Off Dead or of Hurting Yourself in Some Way -   PHQ-9: Brief Depression Severity Measure Score 0   If You Checked Off Any Problems, How Difficult Have These Problems Made It For You to Do Your Work, Take Care of Things at Home, or Get Along with Other People? -       Health Habits and Functional and Cognitive Screening:  Functional & Cognitive Status 10/12/2022   Do you have difficulty preparing food and eating? No   Do you have difficulty bathing yourself, getting dressed or grooming yourself? No   Do you have difficulty using the toilet? No   Do you have difficulty moving around from place to place? No   Do you have trouble with steps or getting out of a bed or a chair? No   Current Diet Well Balanced Diet   Dental Exam Not up to date   Eye Exam Up to date   Exercise (times per week) 7 times per week   Current Exercises Include Walking;Other;Yard Work   Do you need help using the phone?  No   Are you deaf or do you have serious difficulty hearing?  No   Do you need help with transportation? No   Do you need help shopping? No   Do you need help preparing meals?  No   Do you need help with housework?  No   Do you need help with laundry? No   Do you need help taking your medications? No   Do you need  help managing money? No   Do you ever drive or ride in a car without wearing a seat belt? No   Have you felt unusual stress, anger or loneliness in the last month? No   Who do you live with? Spouse   If you need help, do you have trouble finding someone available to you? No   Have you been bothered in the last four weeks by sexual problems? No   Do you have difficulty concentrating, remembering or making decisions? No       Age-appropriate Screening Schedule:  Refer to the list below for future screening recommendations based on patient's age, sex and/or medical conditions. Orders for these recommended tests are listed in the plan section. The patient has been provided with a written plan.    Health Maintenance   Topic Date Due   • TDAP/TD VACCINES (1 - Tdap) Never done   • ZOSTER VACCINE (1 of 2) Never done   • HEMOGLOBIN A1C  10/06/2022   • DIABETIC EYE EXAM  11/30/2022 (Originally 6/9/2021)   • LIPID PANEL  01/25/2023   • URINE MICROALBUMIN  07/12/2023   • INFLUENZA VACCINE  Completed              Assessment & Plan   CMS Preventative Services Quick Reference  Risk Factors Identified During Encounter  Cardiovascular Disease  Immunizations Discussed/Encouraged (specific Immunizations; Tdap and Shingrix  Polypharmacy  The above risks/problems have been discussed with the patient.  Follow up actions/plans if indicated are seen below in the Assessment/Plan Section.  Pertinent information has been shared with the patient in the After Visit Summary.    Diagnoses and all orders for this visit:    1. Medicare annual wellness visit, subsequent (Primary)    2. Type 2 diabetes mellitus without complication, without long-term current use of insulin (HCC)  -     Hemoglobin A1c    3. Monoclonal gammopathy  -     ROBERTO & PE, Random Urine - Urine, Clean Catch  -     CBC & Differential  -     Comprehensive Metabolic Panel    4. Chronic congestive heart failure, unspecified heart failure type (HCC)    5. Atrial fibrillation,  unspecified type (HCC)    6. Need for influenza vaccination  -     Fluzone High-Dose 65+yrs ([id number])    Continue follow-ups with cardiologist for hypertension and chronic atrial fibrillation and CHF.  Continue follow-ups with oncology for monoclonal gammopathy.  Diabetes is well controlled.  Continue following diabetic diet.  Give high-dose flu vaccine today.    Follow Up:   Return in about 3 months (around 1/12/2023), or if symptoms worsen or fail to improve, for Next scheduled follow up.     An After Visit Summary and PPPS were made available to the patient.

## 2022-10-12 NOTE — TELEPHONE ENCOUNTER
Caller: ED    Relationship: OFFICE OF [facility]. [facility]    Best call back number: [phone]    Who are you requesting to speak with (clinical staff, provider,  specific staff member):CLINICAL      What was the call regarding: CALLING TO VERIFY LAB ORDER ON URINE SPECIMEN    Do you require a callback: YES

## 2022-10-13 ENCOUNTER — TELEPHONE (OUTPATIENT)
Dept: FAMILY MEDICINE CLINIC | Facility: CLINIC | Age: 87
End: 2022-10-13

## 2022-10-13 DIAGNOSIS — E78.2 MIXED HYPERLIPIDEMIA: Primary | ICD-10-CM

## 2022-10-13 NOTE — TELEPHONE ENCOUNTER
Called patients daughter to let her know that something wrong happened with a lab draw that was drawn and when patient goes to hospital for the first void urine to go ahead and get that order completed as well. HUB PLEASE READ AND WARM TRANSFER IF PATIENT HAS QUESTIONS.

## 2022-10-14 ENCOUNTER — LAB (OUTPATIENT)
Dept: LAB | Facility: HOSPITAL | Age: 87
End: 2022-10-14

## 2022-10-14 DIAGNOSIS — E78.2 MIXED HYPERLIPIDEMIA: ICD-10-CM

## 2022-10-14 DIAGNOSIS — D47.2 MONOCLONAL PARAPROTEINEMIA: ICD-10-CM

## 2022-10-14 LAB
ALBUMIN SERPL-MCNC: 4.1 G/DL (ref 3.5–5.2)
ALBUMIN/GLOB SERPL: 1.6 G/DL
ALP SERPL-CCNC: 71 U/L (ref 39–117)
ALT SERPL W P-5'-P-CCNC: 16 U/L (ref 1–41)
ANION GAP SERPL CALCULATED.3IONS-SCNC: 8.7 MMOL/L (ref 5–15)
AST SERPL-CCNC: 25 U/L (ref 1–40)
BILIRUB SERPL-MCNC: 0.7 MG/DL (ref 0–1.2)
BUN SERPL-MCNC: 21 MG/DL (ref 8–23)
BUN/CREAT SERPL: 16.7 (ref 7–25)
CALCIUM SPEC-SCNC: 9.2 MG/DL (ref 8.6–10.5)
CHLORIDE SERPL-SCNC: 102 MMOL/L (ref 98–107)
CO2 SERPL-SCNC: 26.3 MMOL/L (ref 22–29)
CREAT SERPL-MCNC: 1.26 MG/DL (ref 0.76–1.27)
EGFRCR SERPLBLD CKD-EPI 2021: 55.2 ML/MIN/1.73
GLOBULIN UR ELPH-MCNC: 2.5 GM/DL
GLUCOSE SERPL-MCNC: 92 MG/DL (ref 65–99)
POTASSIUM SERPL-SCNC: 3.9 MMOL/L (ref 3.5–5.2)
PROT SERPL-MCNC: 6.6 G/DL (ref 6–8.5)
SODIUM SERPL-SCNC: 137 MMOL/L (ref 136–145)

## 2022-10-14 PROCEDURE — 80053 COMPREHEN METABOLIC PANEL: CPT

## 2022-10-14 PROCEDURE — 36415 COLL VENOUS BLD VENIPUNCTURE: CPT

## 2022-10-17 PROCEDURE — 84156 ASSAY OF PROTEIN URINE: CPT

## 2022-10-17 PROCEDURE — 84166 PROTEIN E-PHORESIS/URINE/CSF: CPT

## 2022-10-17 PROCEDURE — 86335 IMMUNFIX E-PHORSIS/URINE/CSF: CPT

## 2022-10-19 ENCOUNTER — OFFICE VISIT (OUTPATIENT)
Dept: ONCOLOGY | Facility: HOSPITAL | Age: 87
End: 2022-10-19

## 2022-10-19 ENCOUNTER — LAB (OUTPATIENT)
Dept: ONCOLOGY | Facility: HOSPITAL | Age: 87
End: 2022-10-19

## 2022-10-19 VITALS
TEMPERATURE: 97.9 F | WEIGHT: 160.5 LBS | DIASTOLIC BLOOD PRESSURE: 69 MMHG | SYSTOLIC BLOOD PRESSURE: 163 MMHG | HEART RATE: 70 BPM | OXYGEN SATURATION: 100 % | RESPIRATION RATE: 18 BRPM | BODY MASS INDEX: 23.7 KG/M2

## 2022-10-19 DIAGNOSIS — D50.9 IRON DEFICIENCY ANEMIA, UNSPECIFIED IRON DEFICIENCY ANEMIA TYPE: ICD-10-CM

## 2022-10-19 DIAGNOSIS — I48.91 ATRIAL FIBRILLATION, UNSPECIFIED TYPE: ICD-10-CM

## 2022-10-19 DIAGNOSIS — D47.2 MONOCLONAL GAMMOPATHY: Primary | ICD-10-CM

## 2022-10-19 LAB
ALBUMIN 24H MFR UR ELPH: 17.3 %
ALBUMIN SERPL-MCNC: 4.4 G/DL (ref 3.5–5.2)
ALBUMIN/GLOB SERPL: 1.5 G/DL
ALP SERPL-CCNC: 79 U/L (ref 39–117)
ALPHA1 GLOB 24H MFR UR ELPH: 2.4 %
ALPHA2 GLOB 24H MFR UR ELPH: 11.1 %
ALT SERPL W P-5'-P-CCNC: 15 U/L (ref 1–41)
ANION GAP SERPL CALCULATED.3IONS-SCNC: 9.9 MMOL/L (ref 5–15)
AST SERPL-CCNC: 25 U/L (ref 1–40)
B-GLOBULIN MFR UR ELPH: 59.3 %
BASOPHILS # BLD AUTO: 0.05 10*3/MM3 (ref 0–0.2)
BASOPHILS NFR BLD AUTO: 0.8 % (ref 0–1.5)
BILIRUB SERPL-MCNC: 0.8 MG/DL (ref 0–1.2)
BUN SERPL-MCNC: 23 MG/DL (ref 8–23)
BUN/CREAT SERPL: 19.8 (ref 7–25)
CALCIUM SPEC-SCNC: 9.5 MG/DL (ref 8.6–10.5)
CHLORIDE SERPL-SCNC: 103 MMOL/L (ref 98–107)
CO2 SERPL-SCNC: 28.1 MMOL/L (ref 22–29)
CREAT SERPL-MCNC: 1.16 MG/DL (ref 0.76–1.27)
DEPRECATED RDW RBC AUTO: 49.7 FL (ref 37–54)
EGFRCR SERPLBLD CKD-EPI 2021: 61 ML/MIN/1.73
EOSINOPHIL # BLD AUTO: 0.17 10*3/MM3 (ref 0–0.4)
EOSINOPHIL NFR BLD AUTO: 2.6 % (ref 0.3–6.2)
ERYTHROCYTE [DISTWIDTH] IN BLOOD BY AUTOMATED COUNT: 13.4 % (ref 12.3–15.4)
FERRITIN SERPL-MCNC: 298.8 NG/ML (ref 30–400)
GAMMA GLOB 24H MFR UR ELPH: 9.9 %
GLOBULIN UR ELPH-MCNC: 3 GM/DL
GLUCOSE SERPL-MCNC: 76 MG/DL (ref 65–99)
HCT VFR BLD AUTO: 36.8 % (ref 37.5–51)
HGB BLD-MCNC: 12.4 G/DL (ref 13–17.7)
HIV 1 & 2 AB SER-IMP: ABNORMAL
IMM GRANULOCYTES # BLD AUTO: 0.02 10*3/MM3 (ref 0–0.05)
IMM GRANULOCYTES NFR BLD AUTO: 0.3 % (ref 0–0.5)
INTERPRETATION UR IFE-IMP: ABNORMAL
IRON 24H UR-MRATE: 65 MCG/DL (ref 59–158)
IRON SATN MFR SERPL: 22 % (ref 20–50)
LDH SERPL-CCNC: 215 U/L (ref 135–225)
LYMPHOCYTES # BLD AUTO: 1.72 10*3/MM3 (ref 0.7–3.1)
LYMPHOCYTES NFR BLD AUTO: 26.3 % (ref 19.6–45.3)
M PROTEIN 24H MFR UR ELPH: 44 %
MCH RBC QN AUTO: 33.8 PG (ref 26.6–33)
MCHC RBC AUTO-ENTMCNC: 33.7 G/DL (ref 31.5–35.7)
MCV RBC AUTO: 100.3 FL (ref 79–97)
MONOCYTES # BLD AUTO: 0.67 10*3/MM3 (ref 0.1–0.9)
MONOCYTES NFR BLD AUTO: 10.3 % (ref 5–12)
NEUTROPHILS NFR BLD AUTO: 3.9 10*3/MM3 (ref 1.7–7)
NEUTROPHILS NFR BLD AUTO: 59.7 % (ref 42.7–76)
NRBC BLD AUTO-RTO: 0 /100 WBC (ref 0–0.2)
PLATELET # BLD AUTO: 145 10*3/MM3 (ref 140–450)
PMV BLD AUTO: 10 FL (ref 6–12)
POTASSIUM SERPL-SCNC: 4.2 MMOL/L (ref 3.5–5.2)
PROT SERPL-MCNC: 7.4 G/DL (ref 6–8.5)
PROT UR-MCNC: 15.7 MG/DL
RBC # BLD AUTO: 3.67 10*6/MM3 (ref 4.14–5.8)
SODIUM SERPL-SCNC: 141 MMOL/L (ref 136–145)
TIBC SERPL-MCNC: 299 MCG/DL (ref 298–536)
TRANSFERRIN SERPL-MCNC: 201 MG/DL (ref 200–360)
WBC NRBC COR # BLD: 6.53 10*3/MM3 (ref 3.4–10.8)

## 2022-10-19 PROCEDURE — 86334 IMMUNOFIX E-PHORESIS SERUM: CPT | Performed by: NURSE PRACTITIONER

## 2022-10-19 PROCEDURE — 84466 ASSAY OF TRANSFERRIN: CPT | Performed by: NURSE PRACTITIONER

## 2022-10-19 PROCEDURE — 83521 IG LIGHT CHAINS FREE EACH: CPT | Performed by: NURSE PRACTITIONER

## 2022-10-19 PROCEDURE — 83540 ASSAY OF IRON: CPT | Performed by: NURSE PRACTITIONER

## 2022-10-19 PROCEDURE — 82728 ASSAY OF FERRITIN: CPT | Performed by: NURSE PRACTITIONER

## 2022-10-19 PROCEDURE — 80053 COMPREHEN METABOLIC PANEL: CPT | Performed by: NURSE PRACTITIONER

## 2022-10-19 PROCEDURE — 82784 ASSAY IGA/IGD/IGG/IGM EACH: CPT | Performed by: NURSE PRACTITIONER

## 2022-10-19 PROCEDURE — 99213 OFFICE O/P EST LOW 20 MIN: CPT | Performed by: NURSE PRACTITIONER

## 2022-10-19 PROCEDURE — 83615 LACTATE (LD) (LDH) ENZYME: CPT | Performed by: NURSE PRACTITIONER

## 2022-10-19 PROCEDURE — 36415 COLL VENOUS BLD VENIPUNCTURE: CPT | Performed by: NURSE PRACTITIONER

## 2022-10-19 PROCEDURE — G0463 HOSPITAL OUTPT CLINIC VISIT: HCPCS | Performed by: NURSE PRACTITIONER

## 2022-10-19 PROCEDURE — 85025 COMPLETE CBC W/AUTO DIFF WBC: CPT | Performed by: NURSE PRACTITIONER

## 2022-10-19 NOTE — PROGRESS NOTES
Chief Complaint  Anemia   MGUS    Jessica Webster, *  Paco Daniel APRN      Subjective          Arvind Coates presents to [facility] for anemia and MGUS    History of Present Illness   Mr. Arvind Coates presents with his wife for follow up. He was seen in July by Dr. Shen for MGUS follow up. He also had RACHEL and was given Injectafer x 2 back in May. Repeat iron labs were done in June. He reports his fatigue has improved and feels really quite well right now.     He completed bone marrow biopsy and was noted to have clonal plasma cell population around 7%. He reports completed lab work on 10/17/22 and all I see is a CMP. There is no SPEP or CBC noted to be present. He did turn urine in for UPEP in on 10/17/22 and appears the the M-spike in the urine has doubled from 23.3 to 44 and has a IgA kappa light chain monoclonal protein present. Skeletal survey done in July and was to have no evidence for osseous metastatic disease or myelomatous involvement in the skeleton.     Oncology/Hematology History    No history exists.       Review of Systems   Constitutional: Positive for fatigue. Negative for appetite change, diaphoresis, fever, unexpected weight gain and unexpected weight loss.   HENT: Negative for hearing loss, sore throat and voice change.    Eyes: Negative for blurred vision, double vision, pain, redness and visual disturbance.   Respiratory: Negative for cough, shortness of breath and wheezing.    Cardiovascular: Negative for chest pain, palpitations and leg swelling.   Endocrine: Negative for cold intolerance, heat intolerance, polydipsia and polyuria.   Genitourinary: Negative for decreased urine volume, difficulty urinating, frequency and urinary incontinence.   Musculoskeletal: Negative for arthralgias, back pain, joint swelling and myalgias.   Skin: Negative for color change, rash, skin lesions and wound.   Neurological: Negative for dizziness, seizures,  numbness and headache.   Hematological: Negative for adenopathy. Does not bruise/bleed easily.   Psychiatric/Behavioral: Negative for depressed mood. The patient is not nervous/anxious.    All other systems reviewed and are negative.      Current Outpatient Medications on File Prior to Visit   Medication Sig Dispense Refill   • albuterol sulfate  (90 Base) MCG/ACT inhaler Inhale 2 puffs Every 6 (Six) Hours As Needed.     • aspirin 81 MG chewable tablet aspirin 81 mg oral tablet,chewable chew 1 tablet (81 mg) by oral route once daily   Active     • atorvastatin (LIPITOR) 40 MG tablet atorvastatin 40 mg oral tablet take 1 tablet (40 mg) by oral route once daily at bedtime   Active     • fluticasone (FLONASE) 50 MCG/ACT nasal spray SPRAY 2 SPRAYS IN EACH NOSTRIL BY INTRANASAL ROUTE ONCE DAILY AS NEEDED     • Ipratropium-Albuterol (ALBUTEROL-IPRATROPIUM IN) Duo Neb 3 ml inhalation Q 4 hours as needed   Active     • lisinopril (PRINIVIL,ZESTRIL) 10 MG tablet lisinopril 10 mg oral tablet take 1 tablet (10 mg) by oral route once daily   Active     • metFORMIN ER (GLUCOPHAGE-XR) 500 MG 24 hr tablet Take 1 tablet by mouth Daily With Breakfast. 90 tablet 1   • metoprolol succinate XL (TOPROL-XL) 25 MG 24 hr tablet metoprolol succinate 25 mg oral tablet extended release 24 hr take 1 tablet (25 mg) by oral route once daily for 30 days   Suspended     • metoprolol succinate XL (TOPROL-XL) 50 MG 24 hr tablet Take 1.5 tablets by mouth Daily.     • omeprazole (priLOSEC) 40 MG capsule Take 1 capsule by mouth Daily. 30 capsule 3   • warfarin (COUMADIN) 2 MG tablet Take 2 mg by mouth Every Night.       No current facility-administered medications on file prior to visit.       No Known Allergies  Past Medical History:   Diagnosis Date   • Anemia    • Bleeding     Bleeding issues post 1  colonoscopy and during 1 prep- caused to pass out   • Broken bones    • CHF (congestive heart failure) (HCC)    • COVID-19     3/2021   •  Hemorrhoids    • Hyperlipidemia    • Hypertension    • Pneumonia    • Pre-diabetes    • Shortness of breath      Past Surgical History:   Procedure Laterality Date   • APPENDECTOMY     • BONE MARROW BIOPSY     • COLONOSCOPY      2012?- normal per patient   • COLONOSCOPY N/A 8/10/2022    Procedure: COLONOSCOPY WITH ORISE INJECTION/POLYPECTOMY /SNARE/CLIP APPLICATION X9;  Surgeon: Steven Holm MD;  Location: [facility];  Service: Gastroenterology;  Laterality: N/A;  COLON POLYPS  DIVERTICULOSIS   • CORONARY ARTERY BYPASS GRAFT      triple   • ENDOSCOPY N/A 8/10/2022    Procedure: ESOPHAGOGASTRODUODENOSCOPY WITH BIOPSIES;  Surgeon: Steven Holm MD;  Location: [facility] END[facility];  Service: Gastroenterology;  Laterality: N/A;  HIATAL HERNIA  ERIK ULCER   • HEMORRHOIDECTOMY     • UPPER GASTROINTESTINAL ENDOSCOPY       Social History     Socioeconomic History   • Marital status: Single   Tobacco Use   • Smoking status: Passive Smoke Exposure - Never Smoker   • Smokeless tobacco: Never   Vaping Use   • Vaping Use: Never used   Substance and Sexual Activity   • Alcohol use: Never   • Drug use: Never   • Sexual activity: Defer     Family History   Problem Relation Age of Onset   • Colon cancer Brother    • Prostate cancer Brother    • Diabetes Other    • Malig Hyperthermia Neg Hx      Immunization History   Administered Date(s) Administered   • COVID-19 (MODERNA) 1st, 2nd, 3rd Dose Only 03/10/2021, 10/20/2021, 02/28/2022   • COVID-19 (MODERNA) BOOSTER 07/18/2022   • Fluzone High-Dose 65+yrs 10/08/2021, 10/12/2022   • Pneumococcal Conjugate 20-Valent (PCV20) 06/08/2022       Objective   Physical Exam  Vitals and nursing note reviewed.   Constitutional:       Appearance: Normal appearance. He is normal weight.   HENT:      Head: Normocephalic.      Nose: Nose normal.      Mouth/Throat:      Mouth: Mucous membranes are moist.   Eyes:      Pupils: Pupils are equal, round, and reactive to light.    Cardiovascular:      Rate and Rhythm: Normal rate and regular rhythm.      Pulses: Normal pulses.      Heart sounds: Normal heart sounds.   Pulmonary:      Effort: Pulmonary effort is normal.      Breath sounds: Normal breath sounds.   Abdominal:      General: Abdomen is flat. Bowel sounds are normal.      Palpations: Abdomen is soft.   Musculoskeletal:         General: Normal range of motion.      Cervical back: Normal range of motion and neck supple.   Skin:     General: Skin is warm and dry.      Capillary Refill: Capillary refill takes less than 2 seconds.   Neurological:      General: No focal deficit present.      Mental Status: He is alert and oriented to person, place, and time.   Psychiatric:         Mood and Affect: Mood normal.         Behavior: Behavior normal.         Thought Content: Thought content normal.         Judgment: Judgment normal.         Vitals:    10/19/22 1336   BP: 163/69   Pulse: 70   Resp: 18   Temp: 97.9 °F (36.6 °C)   TempSrc: Temporal   SpO2: 100%   Weight: 72.8 kg (160 lb 7.9 oz)   PainSc: 0-No pain     ECOG score: 0         ECOG: (0) Fully Active - Able to Carry On All Pre-disease Performance Without Restriction  Fall Risk Assessment was completed, and patient is at low risk for falls.  PHQ-9 Total Score:         The patient is not  experiencing fatigue. Fatigue score: 0    PT/OT Functional Screening: PT fx screen: No needs identified  Speech Functional Screening: Speech fx screen: No needs identified  Rehab to be ordered:         Result Review :   The following data was reviewed by: OLIVIA Andrade on 10/19/2022:  Lab Results   Component Value Date    HGB 12.3 (L) 10/12/2022    HCT 37.5 10/12/2022    .2 (H) 10/12/2022     10/12/2022    WBC 6.21 10/12/2022    NEUTROABS 3.57 10/12/2022    LYMPHSABS 1.72 10/12/2022    MONOSABS 0.72 10/12/2022    EOSABS 0.15 10/12/2022    BASOSABS 0.04 10/12/2022     Lab Results   Component Value Date    GLUCOSE 92  10/14/2022    BUN 21 10/14/2022    CREATININE 1.26 10/14/2022     10/14/2022    K 3.9 10/14/2022     10/14/2022    CO2 26.3 10/14/2022    CALCIUM 9.2 10/14/2022    PROTEINTOT 6.6 10/14/2022    ALBUMIN 4.10 10/14/2022    BILITOT 0.7 10/14/2022    ALKPHOS 71 10/14/2022    AST 25 10/14/2022    ALT 16 10/14/2022          Assessment and Plan    Diagnoses and all orders for this visit:    1. Monoclonal gammopathy (Primary)  -     Comprehensive Metabolic Panel  -     Lactate Dehydrogenase  -     Immunofixation, Serum  -     Immunoglobulin Free LT Chains Blood  -     Cancel: IgG  -     Cancel: IgM  -     Cancel: IgA  -     Immunofixation, Serum; Future  -     Immunoglobulin Free LT Chains Blood; Future  -     IgG; Future  -     IgM; Future  -     IgA; Future  -     CBC & Differential; Future  -     Comprehensive Metabolic Panel; Future    2. Iron deficiency anemia, unspecified iron deficiency anemia type  -     CBC & Differential  -     Iron Profile  -     Ferritin      Plasma cell dyscrasia noted in the bone marrow biopsy of 7%. CMP on 10/14/22 did not show any elevated calcium level or kidney dysfunction. His creatinine is 1.26 with gfr of 55. Monoclonal M spike did increase in the urine, but without the SPEP, SFLC and immunoglobulins can not see how serum lab testing. He will completed the rest of the lab work today and will call his daughter with results. Lab work was ordered for 4 months today as well with SPEP, SFLC, and Immunoglobulins, CBC, CMP. Discussed his lab work with Dr. Shen today and no concern for worsening M spike in the urine until the serum studies are reviewed.     Will also recheck his iron studies, ferritin today as well.     He requests his daughter be contacted with lab results.       Patient Follow Up: 4 months with Dr. Shen.     Patient was given instructions and counseling regarding his condition or for health maintenance advice. Please see specific information pulled into the AVS  if appropriate.     Jessica Webster, APRN    10/19/2022

## 2022-10-21 LAB
IGA SERPL-MCNC: 406 MG/DL (ref 61–437)
IGG SERPL-MCNC: 887 MG/DL (ref 603–1613)
IGM SERPL-MCNC: 81 MG/DL (ref 15–143)
KAPPA LC FREE SER-MCNC: 220.5 MG/L (ref 3.3–19.4)
KAPPA LC FREE/LAMBDA FREE SER: 9.46 {RATIO} (ref 0.26–1.65)
LAMBDA LC FREE SERPL-MCNC: 23.3 MG/L (ref 5.7–26.3)
PROT PATTERN SERPL IFE-IMP: ABNORMAL

## 2022-11-09 NOTE — PROGRESS NOTES
Chief Complaint  EGD and colonoscopy follow up     Arvind Coates is a 87 y.o. male who presents to [facility] for EGD and colonoscopy follow up     History of present Illness  Patient presents to the office for EGD and colonoscopy follow up.  Patient has history of iron deficiency anemia, large hiatal hernia, constipation, and history of colon polyps.  Most recent CBC shows improving anemia with a hemoglobin of 12.4.  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  Heartburn is well controlled with omeprazole 40 mg daily.  Denies breakthrough heartburn, nausea, vomiting, epigastric pain, and dysphagia.  Patient is managing constipation with stool softeners as needed.  Denies diarrhea, melena, hematochezia, and unintentional weight loss.  Overall patient feels that he is doing very well.    EGD and colonoscopy 8/10/2022 by Dr. Holm - large hiatal hernia, esophageal ulcers, normal stomach and duodenum.  Normal stomach biopsies.  8 mm polyp in the rectum, 14 mm polyp in the ascending colon, 8 mm polyp in the transverse colon, 8 mm polyp in the sigmoid colon, diverticulosis in the sigmoid colon.  Polyps tubular villous and tubular adenoma.    Past Medical History:   Diagnosis Date   • Anemia    • Bleeding     Bleeding issues post 1  colonoscopy and during 1 prep- caused to pass out   • Broken bones    • CHF (congestive heart failure) (HCC)    • COVID-19     3/2021   • Hemorrhoids    • Hyperlipidemia    • Hypertension    • Pneumonia    • Pre-diabetes    • Shortness of breath        Past Surgical History:   Procedure Laterality Date   • APPENDECTOMY     • BONE MARROW BIOPSY     • COLONOSCOPY      2012?- normal per patient   • COLONOSCOPY N/A 8/10/2022    Procedure: COLONOSCOPY WITH ORISE INJECTION/POLYPECTOMY /SNARE/CLIP APPLICATION X9;  Surgeon: Steven Holm MD;  Location: [facility] ENDOSCOPY;  Service: Gastroenterology;  Laterality: N/A;  COLON  POLYPS  DIVERTICULOSIS   • CORONARY ARTERY BYPASS GRAFT      triple   • ENDOSCOPY N/A 8/10/2022    Procedure: ESOPHAGOGASTRODUODENOSCOPY WITH BIOPSIES;  Surgeon: Steven Holm MD;  Location: [facility] ENDOSCOPY;  Service: Gastroenterology;  Laterality: N/A;  HIATAL HERNIA  ERIK ULCER   • HEMORRHOIDECTOMY     • UPPER GASTROINTESTINAL ENDOSCOPY           Current Outpatient Medications:   •  albuterol sulfate  (90 Base) MCG/ACT inhaler, Inhale 2 puffs Every 6 (Six) Hours As Needed., Disp: , Rfl:   •  aspirin 81 MG chewable tablet, aspirin 81 mg oral tablet,chewable chew 1 tablet (81 mg) by oral route once daily   Active, Disp: , Rfl:   •  atorvastatin (LIPITOR) 40 MG tablet, atorvastatin 40 mg oral tablet take 1 tablet (40 mg) by oral route once daily at bedtime   Active, Disp: , Rfl:   •  fluticasone (FLONASE) 50 MCG/ACT nasal spray, SPRAY 2 SPRAYS IN EACH NOSTRIL BY INTRANASAL ROUTE ONCE DAILY AS NEEDED, Disp: , Rfl:   •  Ipratropium-Albuterol (ALBUTEROL-IPRATROPIUM IN), Duo Neb 3 ml inhalation Q 4 hours as needed   Active, Disp: , Rfl:   •  lisinopril (PRINIVIL,ZESTRIL) 10 MG tablet, lisinopril 10 mg oral tablet take 1 tablet (10 mg) by oral route once daily   Active, Disp: , Rfl:   •  metFORMIN ER (GLUCOPHAGE-XR) 500 MG 24 hr tablet, Take 1 tablet by mouth Daily With Breakfast., Disp: 90 tablet, Rfl: 1  •  metoprolol succinate XL (TOPROL-XL) 25 MG 24 hr tablet, metoprolol succinate 25 mg oral tablet extended release 24 hr take 1 tablet (25 mg) by oral route once daily for 30 days   Suspended, Disp: , Rfl:   •  metoprolol succinate XL (TOPROL-XL) 50 MG 24 hr tablet, Take 1.5 tablets by mouth Daily., Disp: , Rfl:   •  omeprazole (priLOSEC) 40 MG capsule, Take 1 capsule by mouth Daily., Disp: 30 capsule, Rfl: 3  •  warfarin (COUMADIN) 2 MG tablet, Take 2 mg by mouth Every Night., Disp: , Rfl:      No Known Allergies    Family History   Problem Relation Age of Onset   • Colon cancer Brother 55   •  "Prostate cancer Brother    • Diabetes Other    • Malig Hyperthermia Neg Hx         Social History     Social History Narrative   • Not on file       Objective       Vital Signs:   /57 (BP Location: Left arm, Patient Position: Sitting, Cuff Size: Adult)   Pulse 59   Ht 175.3 cm (69.02\")   Wt 71 kg (156 lb 9.6 oz)   SpO2 99%   BMI 23.12 kg/m²       Physical Exam  Constitutional:       Appearance: Normal appearance.   HENT:      Head: Normocephalic.   Cardiovascular:      Rate and Rhythm: Normal rate and regular rhythm.      Heart sounds: Normal heart sounds.   Pulmonary:      Effort: Pulmonary effort is normal.      Breath sounds: Normal breath sounds.   Abdominal:      General: Bowel sounds are normal.      Palpations: Abdomen is soft.   Skin:     General: Skin is warm and dry.   Neurological:      Mental Status: He is alert and oriented to person, place, and time. Mental status is at baseline.   Psychiatric:         Mood and Affect: Mood normal.         Behavior: Behavior normal.         Thought Content: Thought content normal.         Judgment: Judgment normal.       Result Review :       CBC w/diff    CBC w/Diff 6/13/22 10/12/22 10/19/22   WBC 6.17 6.21 6.53   RBC 4.42 3.67 (A) 3.67 (A)   Hemoglobin 12.8 (A) 12.3 (A) 12.4 (A)   Hematocrit 40.3 37.5 36.8 (A)   MCV 91.2 102.2 (A) 100.3 (A)   MCH 29.0 33.5 (A) 33.8 (A)   MCHC 31.8 32.8 33.7   RDW 24.0 (A) 13.5 13.4   Platelets 169 144 145   Neutrophil Rel % 67.5 57.5 59.7   Immature Granulocyte Rel % 0.3 0.2 0.3   Lymphocyte Rel % 19.4 (A) 27.7 26.3   Monocyte Rel % 9.6 11.6 10.3   Eosinophil Rel % 2.4 2.4 2.6   Basophil Rel % 0.8 0.6 0.8   (A) Abnormal value            CMP    CMP 6/2/22 10/14/22 10/19/22   Glucose 82 92 76   BUN 22 21 23   Creatinine 1.30 (A) 1.26 1.16   Sodium 137 137 141   Potassium 4.3 3.9 4.2   Chloride 103 102 103   Calcium 9.2 9.2 9.5   Albumin 4.25 4.10 4.40   Total Bilirubin 1.6 (A) 0.7 0.8   Alkaline Phosphatase 115 71 79   AST " (SGOT) 29 25 25   ALT (SGPT) 23 16 15   (A) Abnormal value                    Assessment and Plan    Diagnoses and all orders for this visit:    1. Iron deficiency anemia, unspecified iron deficiency anemia type (Primary)    2. Hiatal hernia    3. Ponce ulcer, unspecified ulcer chronicity    4. History of colon polyps      87 year old patient presents to the office for EGD and colonoscopy follow up.  Patient has history of iron deficiency anemia, large hiatal hernia, constipation, and history of colon polyps.  Most recent CBC shows improving anemia with a hemoglobin of 12.4. 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 Patient will continue omeprazole 40 mg daily.  If anemia returns would consider a course of Carafate at that time.  Patient reports doing very well and has no GI complaints.  Patient will follow up on an as-needed basis.  Patient is agreeable to plan will call the office any questions or concerns.    Follow Up   No follow-ups on file.  Patient was given instructions and counseling regarding his condition or for health maintenance advice. Please see specific information pulled into the AVS if appropriate.

## 2022-11-10 ENCOUNTER — OFFICE VISIT (OUTPATIENT)
Dept: GASTROENTEROLOGY | Facility: CLINIC | Age: 87
End: 2022-11-10

## 2022-11-10 VITALS
SYSTOLIC BLOOD PRESSURE: 136 MMHG | HEIGHT: 69 IN | HEART RATE: 59 BPM | DIASTOLIC BLOOD PRESSURE: 57 MMHG | BODY MASS INDEX: 23.19 KG/M2 | OXYGEN SATURATION: 99 % | WEIGHT: 156.6 LBS

## 2022-11-10 DIAGNOSIS — K44.9 HIATAL HERNIA: ICD-10-CM

## 2022-11-10 DIAGNOSIS — Z86.010 HISTORY OF COLON POLYPS: ICD-10-CM

## 2022-11-10 DIAGNOSIS — K25.9 CAMERON ULCER, UNSPECIFIED ULCER CHRONICITY: ICD-10-CM

## 2022-11-10 DIAGNOSIS — D50.9 IRON DEFICIENCY ANEMIA, UNSPECIFIED IRON DEFICIENCY ANEMIA TYPE: Primary | ICD-10-CM

## 2022-11-10 PROCEDURE — 99213 OFFICE O/P EST LOW 20 MIN: CPT

## 2022-12-08 NOTE — PROGRESS NOTES
Primary Care Provider  Paco Daniel APRN     Referring Provider  No ref. provider found     Chief Complaint  Follow-up (6 mo f/up ) and Shortness of Breath    Subjective          Arvind Coates presents to [facility]  History of Present Illness  Arvind Coates is a 87 y.o. male patient previously seen by myself and Dr. Morin.  He is here for management of emphysema, atrial fibrillation, dyspnea on exertion, hypertension and chronic respiratory failure with hypoxia.    Patient states he is doing okay since his last visit.  He denies using any antibiotics or steroids for his lungs.  He denies any fevers chills.  His shortness breath is mild in severity, worse with exertion presents rest.  He states that he has been using his albuterol or nebulizer treatments every few weeks or so.  He does have oxygen at home to wear as needed and states that he wears it approximately once a week or if he feels more fatigued than normal.  Patient states that his primary care would like for him to hold onto his oxygen for a little while longer before having it discontinued. Overall, patient has no concerns with his breathing.  He is able to perform his ADLs without difficulty he is up-to-date with his flu, pneumonia and COVID vaccines.     His history of smoking is   Tobacco Use: Medium Risk   • Smoking Tobacco Use: Never   • Smokeless Tobacco Use: Never   • Passive Exposure: Yes   .    Review of Systems   Constitutional: Negative for chills, fatigue, fever, unexpected weight gain and unexpected weight loss.   HENT: Negative for congestion (Nasal), hearing loss, mouth sores, nosebleeds, postnasal drip, sore throat and trouble swallowing.    Eyes: Negative for blurred vision and visual disturbance.   Respiratory: Positive for shortness of breath. Negative for apnea, cough and wheezing.         Negative for Hemoptysis     Cardiovascular: Negative for chest pain, palpitations  and leg swelling.   Gastrointestinal: Negative for abdominal pain, constipation, diarrhea, nausea, vomiting and GERD.        Negative for Jaundice  Negative for Bloating  Negative for Melena   Musculoskeletal: Negative for joint swelling and myalgias.        Negative for Joint pain  Negative for Joint stiffness   Skin: Negative for color change.        Negative for cyanosis   Neurological: Negative for syncope, weakness, numbness and headache.      Sleep: Negative for Excessive daytime sleepiness  Negative for morning headaches  Negative for Snoring    Family History   Problem Relation Age of Onset   • Colon cancer Brother 55   • Prostate cancer Brother    • Diabetes Other    • Malig Hyperthermia Neg Hx         Social History     Socioeconomic History   • Marital status: Single   Tobacco Use   • Smoking status: Never     Passive exposure: Yes   • Smokeless tobacco: Never   Vaping Use   • Vaping Use: Never used   Substance and Sexual Activity   • Alcohol use: Never   • Drug use: Never   • Sexual activity: Defer        Past Medical History:   Diagnosis Date   • Anemia    • Bleeding     Bleeding issues post 1  colonoscopy and during 1 prep- caused to pass out   • Broken bones    • CHF (congestive heart failure) (HCC)    • COVID-19     3/2021   • Hemorrhoids    • Hyperlipidemia    • Hypertension    • Pneumonia    • Pre-diabetes    • Shortness of breath         Immunization History   Administered Date(s) Administered   • COVID-19 (MODERNA) 1st, 2nd, 3rd Dose Only 03/10/2021, 10/20/2021, 02/28/2022   • COVID-19 (MODERNA) BOOSTER 07/18/2022   • Fluzone High-Dose 65+yrs 10/08/2021, 10/12/2022   • Pneumococcal Conjugate 20-Valent (PCV20) 06/08/2022         No Known Allergies       Current Outpatient Medications:   •  albuterol sulfate  (90 Base) MCG/ACT inhaler, Inhale 2 puffs Every 6 (Six) Hours As Needed., Disp: , Rfl:   •  aspirin 81 MG chewable tablet, aspirin 81 mg oral tablet,chewable chew 1 tablet (81 mg) by  oral route once daily   Active, Disp: , Rfl:   •  atorvastatin (LIPITOR) 40 MG tablet, atorvastatin 40 mg oral tablet take 1 tablet (40 mg) by oral route once daily at bedtime   Active, Disp: , Rfl:   •  fluticasone (FLONASE) 50 MCG/ACT nasal spray, SPRAY 2 SPRAYS IN EACH NOSTRIL BY INTRANASAL ROUTE ONCE DAILY AS NEEDED, Disp: , Rfl:   •  Ipratropium-Albuterol (ALBUTEROL-IPRATROPIUM IN), Duo Neb 3 ml inhalation Q 4 hours as needed   Active, Disp: , Rfl:   •  lisinopril (PRINIVIL,ZESTRIL) 10 MG tablet, lisinopril 10 mg oral tablet take 1 tablet (10 mg) by oral route once daily   Active, Disp: , Rfl:   •  metFORMIN ER (GLUCOPHAGE-XR) 500 MG 24 hr tablet, Take 1 tablet by mouth Daily With Breakfast., Disp: 90 tablet, Rfl: 1  •  metoprolol succinate XL (TOPROL-XL) 25 MG 24 hr tablet, metoprolol succinate 25 mg oral tablet extended release 24 hr take 1 tablet (25 mg) by oral route once daily for 30 days   Suspended, Disp: , Rfl:   •  metoprolol succinate XL (TOPROL-XL) 50 MG 24 hr tablet, Take 1.5 tablets by mouth Daily., Disp: , Rfl:   •  omeprazole (priLOSEC) 40 MG capsule, Take 1 capsule by mouth Daily., Disp: 30 capsule, Rfl: 3  •  warfarin (COUMADIN) 2 MG tablet, Take 2 mg by mouth Every Night., Disp: , Rfl:      Objective   Physical Exam  Constitutional:       General: He is not in acute distress.     Appearance: Normal appearance. He is normal weight.   HENT:      Right Ear: Hearing normal.      Left Ear: Hearing normal.      Nose: No nasal tenderness or congestion.      Mouth/Throat:      Mouth: Mucous membranes are moist. No oral lesions.   Eyes:      Extraocular Movements: Extraocular movements intact.      Pupils: Pupils are equal, round, and reactive to light.   Neck:      Thyroid: No thyroid mass or thyromegaly.   Cardiovascular:      Rate and Rhythm: Normal rate and regular rhythm.      Pulses: Normal pulses.      Heart sounds: Normal heart sounds. No murmur heard.  Pulmonary:      Effort: Pulmonary effort  "is normal.      Breath sounds: Normal breath sounds. No wheezing, rhonchi or rales.   Abdominal:      General: Bowel sounds are normal. There is no distension.      Palpations: Abdomen is soft.      Tenderness: There is no abdominal tenderness.   Musculoskeletal:      Cervical back: Neck supple.      Right lower leg: No edema.      Left lower leg: No edema.   Lymphadenopathy:      Cervical: No cervical adenopathy.      Upper Body:      Right upper body: No axillary adenopathy.   Skin:     General: Skin is warm and dry.      Findings: No lesion or rash.   Neurological:      General: No focal deficit present.      Mental Status: He is alert and oriented to person, place, and time.      Cranial Nerves: Cranial nerves are intact.   Psychiatric:         Mood and Affect: Affect normal. Mood is not anxious or depressed.         Vital Signs:   /57 (BP Location: Right arm, Patient Position: Sitting, Cuff Size: Adult)   Pulse 63   Temp 98.2 °F (36.8 °C) (Temporal)   Resp 16   Ht 175.3 cm (69\")   Wt 71.9 kg (158 lb 9.6 oz)   SpO2 97% Comment: room air; PRN 2 L's  BMI 23.42 kg/m²        Result Review :     CMP    CMP 6/2/22 10/14/22 10/19/22   Glucose 82 92 76   BUN 22 21 23   Creatinine 1.30 (A) 1.26 1.16   Sodium 137 137 141   Potassium 4.3 3.9 4.2   Chloride 103 102 103   Calcium 9.2 9.2 9.5   Albumin 4.25 4.10 4.40   Total Bilirubin 1.6 (A) 0.7 0.8   Alkaline Phosphatase 115 71 79   AST (SGOT) 29 25 25   ALT (SGPT) 23 16 15   (A) Abnormal value            CBC w/diff    CBC w/Diff 6/13/22 10/12/22 10/19/22   WBC 6.17 6.21 6.53   RBC 4.42 3.67 (A) 3.67 (A)   Hemoglobin 12.8 (A) 12.3 (A) 12.4 (A)   Hematocrit 40.3 37.5 36.8 (A)   MCV 91.2 102.2 (A) 100.3 (A)   MCH 29.0 33.5 (A) 33.8 (A)   MCHC 31.8 32.8 33.7   RDW 24.0 (A) 13.5 13.4   Platelets 169 144 145   Neutrophil Rel % 67.5 57.5 59.7   Immature Granulocyte Rel % 0.3 0.2 0.3   Lymphocyte Rel % 19.4 (A) 27.7 26.3   Monocyte Rel % 9.6 11.6 10.3   Eosinophil Rel " % 2.4 2.4 2.6   Basophil Rel % 0.8 0.6 0.8   (A) Abnormal value            Data reviewed: Radiologic studies Chest x-ray 6/7/2021, chest CT 12/30/2021 and My last office note   Procedures        Assessment and Plan    Diagnoses and all orders for this visit:    1. Pulmonary emphysema, unspecified emphysema type (HCC) (Primary)    2. Dyspnea on exertion    3. Chronic respiratory failure with hypoxia (HCC)    4. Atrial fibrillation, unspecified type (HCC)    5.  Continue albuterol and nebulizer treatments as needed.  6.  Continue supplemental oxygen to maintain saturations at or above 89%.  7.  Follow-up with cardiology as scheduled.  8.  Follow-up with Michelle in 1 year, sooner if needed.        Follow Up   Return in about 1 year (around 12/9/2023) for Recheck with Michelle.  Patient was given instructions and counseling regarding his condition or for health maintenance advice. Please see specific information pulled into the AVS if appropriate.

## 2022-12-09 ENCOUNTER — OFFICE VISIT (OUTPATIENT)
Dept: PULMONOLOGY | Facility: CLINIC | Age: 87
End: 2022-12-09

## 2022-12-09 VITALS
WEIGHT: 158.6 LBS | SYSTOLIC BLOOD PRESSURE: 131 MMHG | OXYGEN SATURATION: 97 % | RESPIRATION RATE: 16 BRPM | HEIGHT: 69 IN | HEART RATE: 63 BPM | BODY MASS INDEX: 23.49 KG/M2 | DIASTOLIC BLOOD PRESSURE: 57 MMHG | TEMPERATURE: 98.2 F

## 2022-12-09 DIAGNOSIS — I48.91 ATRIAL FIBRILLATION, UNSPECIFIED TYPE: ICD-10-CM

## 2022-12-09 DIAGNOSIS — J43.9 PULMONARY EMPHYSEMA, UNSPECIFIED EMPHYSEMA TYPE: Primary | ICD-10-CM

## 2022-12-09 DIAGNOSIS — J96.11 CHRONIC RESPIRATORY FAILURE WITH HYPOXIA: ICD-10-CM

## 2022-12-09 DIAGNOSIS — R06.09 DYSPNEA ON EXERTION: ICD-10-CM

## 2022-12-09 PROCEDURE — 99213 OFFICE O/P EST LOW 20 MIN: CPT | Performed by: NURSE PRACTITIONER

## 2022-12-30 RX ORDER — OMEPRAZOLE 40 MG/1
40 CAPSULE, DELAYED RELEASE ORAL DAILY
Qty: 30 CAPSULE | Refills: 3 | Status: SHIPPED | OUTPATIENT
Start: 2022-12-30

## 2023-01-23 NOTE — PROGRESS NOTES
"Chief Complaint  Diabetes and GERD    Subjective         Arvind Coates presents to [facility]  HPI   Presents today for 3-month follow-up on diabetes GERD.  He has a history of hypertension hyperlipidemia and A. fib.  He sees cardiology.  His INR has been elevated in which cardiology is managing.  Denies polyuria polydipsia.  His A1c has improved over the past month.  Denies reflux.  Well-controlled with omeprazole 40 mg daily.    Social History     Socioeconomic History   • Marital status: Single   Tobacco Use   • Smoking status: Never     Passive exposure: Yes   • Smokeless tobacco: Never   Vaping Use   • Vaping Use: Never used   Substance and Sexual Activity   • Alcohol use: Never   • Drug use: Never   • Sexual activity: Defer        Objective     Vitals:    01/24/23 1026   BP: 138/68   Pulse: 60   Temp: 98 °F (36.7 °C)   SpO2: 98%   Weight: 77.3 kg (170 lb 6.4 oz)   Height: 175.3 cm (69\")        Body mass index is 25.16 kg/m².    Wt Readings from Last 3 Encounters:   01/24/23 77.3 kg (170 lb 6.4 oz)   12/09/22 71.9 kg (158 lb 9.6 oz)   11/10/22 71 kg (156 lb 9.6 oz)       BP Readings from Last 3 Encounters:   01/24/23 138/68   12/09/22 131/57   11/10/22 136/57         BMI is within normal parameters. No other follow-up for BMI required.        Physical Exam  Vitals reviewed.   Constitutional:       Appearance: Normal appearance. He is well-developed.   HENT:      Head: Normocephalic and atraumatic.      Right Ear: External ear normal.      Left Ear: External ear normal.      Mouth/Throat:      Pharynx: No oropharyngeal exudate.   Eyes:      Conjunctiva/sclera: Conjunctivae normal.      Pupils: Pupils are equal, round, and reactive to light.   Cardiovascular:      Rate and Rhythm: Normal rate and regular rhythm.      Heart sounds: No murmur heard.    No friction rub. No gallop.   Pulmonary:      Effort: Pulmonary effort is normal.      Breath sounds: Normal breath sounds. No " wheezing or rhonchi.   Abdominal:      General: Bowel sounds are normal. There is no distension.      Palpations: Abdomen is soft.      Tenderness: There is no abdominal tenderness.   Skin:     General: Skin is warm and dry.   Neurological:      Mental Status: He is alert and oriented to person, place, and time.   Psychiatric:         Mood and Affect: Mood and affect normal.         Behavior: Behavior normal.         Thought Content: Thought content normal.         Judgment: Judgment normal.          Result Review :   The following data was reviewed by: OLIVIA Adamson on 01/24/2023:  Common labs    Common Labs 10/12/22 10/12/22 10/14/22 10/19/22 10/19/22    1243 1243  1440 1440   Glucose   92  76   BUN   21  23   Creatinine   1.26  1.16   Sodium   137  141   Potassium   3.9  4.2   Chloride   102  103   Calcium   9.2  9.5   Albumin   4.10  4.40   Total Bilirubin   0.7  0.8   Alkaline Phosphatase   71  79   AST (SGOT)   25  25   ALT (SGPT)   16  15   WBC 6.21   6.53    Hemoglobin 12.3 (A)   12.4 (A)    Hematocrit 37.5   36.8 (A)    Platelets 144   145    Hemoglobin A1C  5.70 (A)      (A) Abnormal value              Procedures    Assessment and Plan   Diagnoses and all orders for this visit:    1. Type 2 diabetes mellitus without complication, without long-term current use of insulin (HCC) (Primary)  -     Comprehensive metabolic panel  -     CBC w AUTO Differential  -     Hemoglobin A1c  -     Vitamin B12    2. Stage 3a chronic kidney disease (HCC)  -     Comprehensive metabolic panel  -     CBC w AUTO Differential    3. Primary hypertension  -     Comprehensive metabolic panel  -     CBC w AUTO Differential    4. Atrial fibrillation, unspecified type ([facility])    5. Gastroesophageal reflux disease without esophagitis  -     Vitamin B12      Blood pressure is well controlled.  Continue follow-ups with cardiology.  Diabetes is well controlled.  Reflux is well controlled.  We will check a vitamin B12 since he is  taking omeprazole and metformin.  Continue current regimen.        Follow Up   Return in about 6 months (around 7/24/2023), or if symptoms worsen or fail to improve, for Next scheduled follow up.  Patient was given instructions and counseling regarding his condition or for health maintenance advice. Please see specific information pulled into the AVS if appropriate.     Please note that portions of this note were completed with a voice recognition program.

## 2023-01-24 ENCOUNTER — OFFICE VISIT (OUTPATIENT)
Dept: FAMILY MEDICINE CLINIC | Facility: CLINIC | Age: 88
End: 2023-01-24
Payer: MEDICARE

## 2023-01-24 VITALS
HEIGHT: 69 IN | SYSTOLIC BLOOD PRESSURE: 138 MMHG | BODY MASS INDEX: 25.24 KG/M2 | DIASTOLIC BLOOD PRESSURE: 68 MMHG | OXYGEN SATURATION: 98 % | WEIGHT: 170.4 LBS | TEMPERATURE: 98 F | HEART RATE: 60 BPM

## 2023-01-24 DIAGNOSIS — I48.91 ATRIAL FIBRILLATION, UNSPECIFIED TYPE: ICD-10-CM

## 2023-01-24 DIAGNOSIS — E11.9 TYPE 2 DIABETES MELLITUS WITHOUT COMPLICATION, WITHOUT LONG-TERM CURRENT USE OF INSULIN: Primary | ICD-10-CM

## 2023-01-24 DIAGNOSIS — K21.9 GASTROESOPHAGEAL REFLUX DISEASE WITHOUT ESOPHAGITIS: ICD-10-CM

## 2023-01-24 DIAGNOSIS — I10 PRIMARY HYPERTENSION: ICD-10-CM

## 2023-01-24 DIAGNOSIS — N18.31 STAGE 3A CHRONIC KIDNEY DISEASE: ICD-10-CM

## 2023-01-24 LAB
ALBUMIN SERPL-MCNC: 4.2 G/DL (ref 3.5–5.2)
ALBUMIN/GLOB SERPL: 1.4 G/DL
ALP SERPL-CCNC: 70 U/L (ref 39–117)
ALT SERPL W P-5'-P-CCNC: 12 U/L (ref 1–41)
ANION GAP SERPL CALCULATED.3IONS-SCNC: 9.2 MMOL/L (ref 5–15)
AST SERPL-CCNC: 22 U/L (ref 1–40)
BASOPHILS # BLD AUTO: 0.05 10*3/MM3 (ref 0–0.2)
BASOPHILS NFR BLD AUTO: 0.8 % (ref 0–1.5)
BILIRUB SERPL-MCNC: 0.5 MG/DL (ref 0–1.2)
BUN SERPL-MCNC: 24 MG/DL (ref 8–23)
BUN/CREAT SERPL: 19 (ref 7–25)
CALCIUM SPEC-SCNC: 9.6 MG/DL (ref 8.6–10.5)
CHLORIDE SERPL-SCNC: 106 MMOL/L (ref 98–107)
CO2 SERPL-SCNC: 27.8 MMOL/L (ref 22–29)
CREAT SERPL-MCNC: 1.26 MG/DL (ref 0.76–1.27)
DEPRECATED RDW RBC AUTO: 44.2 FL (ref 37–54)
EGFRCR SERPLBLD CKD-EPI 2021: 55.2 ML/MIN/1.73
EOSINOPHIL # BLD AUTO: 0.18 10*3/MM3 (ref 0–0.4)
EOSINOPHIL NFR BLD AUTO: 3 % (ref 0.3–6.2)
ERYTHROCYTE [DISTWIDTH] IN BLOOD BY AUTOMATED COUNT: 12.9 % (ref 12.3–15.4)
GLOBULIN UR ELPH-MCNC: 3.1 GM/DL
GLUCOSE SERPL-MCNC: 86 MG/DL (ref 65–99)
HBA1C MFR BLD: 6.2 % (ref 4.8–5.6)
HCT VFR BLD AUTO: 36.2 % (ref 37.5–51)
HGB BLD-MCNC: 12.4 G/DL (ref 13–17.7)
IMM GRANULOCYTES # BLD AUTO: 0.02 10*3/MM3 (ref 0–0.05)
IMM GRANULOCYTES NFR BLD AUTO: 0.3 % (ref 0–0.5)
LYMPHOCYTES # BLD AUTO: 1.63 10*3/MM3 (ref 0.7–3.1)
LYMPHOCYTES NFR BLD AUTO: 27.3 % (ref 19.6–45.3)
MCH RBC QN AUTO: 32 PG (ref 26.6–33)
MCHC RBC AUTO-ENTMCNC: 34.3 G/DL (ref 31.5–35.7)
MCV RBC AUTO: 93.3 FL (ref 79–97)
MONOCYTES # BLD AUTO: 0.74 10*3/MM3 (ref 0.1–0.9)
MONOCYTES NFR BLD AUTO: 12.4 % (ref 5–12)
NEUTROPHILS NFR BLD AUTO: 3.34 10*3/MM3 (ref 1.7–7)
NEUTROPHILS NFR BLD AUTO: 56.2 % (ref 42.7–76)
NRBC BLD AUTO-RTO: 0 /100 WBC (ref 0–0.2)
PLATELET # BLD AUTO: 155 10*3/MM3 (ref 140–450)
PMV BLD AUTO: 11.3 FL (ref 6–12)
POTASSIUM SERPL-SCNC: 4.1 MMOL/L (ref 3.5–5.2)
PROT SERPL-MCNC: 7.3 G/DL (ref 6–8.5)
RBC # BLD AUTO: 3.88 10*6/MM3 (ref 4.14–5.8)
SODIUM SERPL-SCNC: 143 MMOL/L (ref 136–145)
WBC NRBC COR # BLD: 5.96 10*3/MM3 (ref 3.4–10.8)

## 2023-01-24 PROCEDURE — 82607 VITAMIN B-12: CPT | Performed by: NURSE PRACTITIONER

## 2023-01-24 PROCEDURE — 99214 OFFICE O/P EST MOD 30 MIN: CPT | Performed by: NURSE PRACTITIONER

## 2023-01-24 PROCEDURE — 85025 COMPLETE CBC W/AUTO DIFF WBC: CPT | Performed by: NURSE PRACTITIONER

## 2023-01-24 PROCEDURE — 80053 COMPREHEN METABOLIC PANEL: CPT | Performed by: NURSE PRACTITIONER

## 2023-01-24 PROCEDURE — 83036 HEMOGLOBIN GLYCOSYLATED A1C: CPT | Performed by: NURSE PRACTITIONER

## 2023-01-24 PROCEDURE — 36415 COLL VENOUS BLD VENIPUNCTURE: CPT | Performed by: NURSE PRACTITIONER

## 2023-01-24 RX ORDER — LISINOPRIL 10 MG/1
1 TABLET ORAL DAILY
COMMUNITY
Start: 2022-12-19 | End: 2023-01-24 | Stop reason: SDUPTHER

## 2023-01-24 RX ORDER — ATORVASTATIN CALCIUM 40 MG/1
1 TABLET, FILM COATED ORAL
COMMUNITY
Start: 2022-12-19 | End: 2023-01-24 | Stop reason: SDUPTHER

## 2023-01-25 LAB — VIT B12 BLD-MCNC: 623 PG/ML (ref 211–946)

## 2023-02-13 ENCOUNTER — LAB (OUTPATIENT)
Dept: ONCOLOGY | Facility: HOSPITAL | Age: 88
End: 2023-02-13
Payer: MEDICARE

## 2023-02-13 DIAGNOSIS — D47.2 MONOCLONAL GAMMOPATHY: ICD-10-CM

## 2023-02-13 LAB
ALBUMIN SERPL-MCNC: 4.2 G/DL (ref 3.5–5.2)
ALBUMIN/GLOB SERPL: 1.4 G/DL
ALP SERPL-CCNC: 79 U/L (ref 39–117)
ALT SERPL W P-5'-P-CCNC: 9 U/L (ref 1–41)
ANION GAP SERPL CALCULATED.3IONS-SCNC: 9.3 MMOL/L (ref 5–15)
AST SERPL-CCNC: 18 U/L (ref 1–40)
BASOPHILS # BLD AUTO: 0.03 10*3/MM3 (ref 0–0.2)
BASOPHILS NFR BLD AUTO: 0.4 % (ref 0–1.5)
BILIRUB SERPL-MCNC: 0.5 MG/DL (ref 0–1.2)
BUN SERPL-MCNC: 23 MG/DL (ref 8–23)
BUN/CREAT SERPL: 18.1 (ref 7–25)
CALCIUM SPEC-SCNC: 9.6 MG/DL (ref 8.6–10.5)
CHLORIDE SERPL-SCNC: 104 MMOL/L (ref 98–107)
CO2 SERPL-SCNC: 26.7 MMOL/L (ref 22–29)
CREAT SERPL-MCNC: 1.27 MG/DL (ref 0.76–1.27)
DEPRECATED RDW RBC AUTO: 49.6 FL (ref 37–54)
EGFRCR SERPLBLD CKD-EPI 2021: 54.7 ML/MIN/1.73
EOSINOPHIL # BLD AUTO: 0.22 10*3/MM3 (ref 0–0.4)
EOSINOPHIL NFR BLD AUTO: 3 % (ref 0.3–6.2)
ERYTHROCYTE [DISTWIDTH] IN BLOOD BY AUTOMATED COUNT: 13.7 % (ref 12.3–15.4)
GLOBULIN UR ELPH-MCNC: 2.9 GM/DL
GLUCOSE SERPL-MCNC: 173 MG/DL (ref 65–99)
HCT VFR BLD AUTO: 36.8 % (ref 37.5–51)
HGB BLD-MCNC: 12.1 G/DL (ref 13–17.7)
IMM GRANULOCYTES # BLD AUTO: 0.01 10*3/MM3 (ref 0–0.05)
IMM GRANULOCYTES NFR BLD AUTO: 0.1 % (ref 0–0.5)
LYMPHOCYTES # BLD AUTO: 1.35 10*3/MM3 (ref 0.7–3.1)
LYMPHOCYTES NFR BLD AUTO: 18.7 % (ref 19.6–45.3)
MCH RBC QN AUTO: 32.1 PG (ref 26.6–33)
MCHC RBC AUTO-ENTMCNC: 32.9 G/DL (ref 31.5–35.7)
MCV RBC AUTO: 97.6 FL (ref 79–97)
MONOCYTES # BLD AUTO: 0.65 10*3/MM3 (ref 0.1–0.9)
MONOCYTES NFR BLD AUTO: 9 % (ref 5–12)
NEUTROPHILS NFR BLD AUTO: 4.97 10*3/MM3 (ref 1.7–7)
NEUTROPHILS NFR BLD AUTO: 68.8 % (ref 42.7–76)
PLATELET # BLD AUTO: 179 10*3/MM3 (ref 140–450)
PMV BLD AUTO: 10.1 FL (ref 6–12)
POTASSIUM SERPL-SCNC: 3.8 MMOL/L (ref 3.5–5.2)
PROT SERPL-MCNC: 7.1 G/DL (ref 6–8.5)
RBC # BLD AUTO: 3.77 10*6/MM3 (ref 4.14–5.8)
SODIUM SERPL-SCNC: 140 MMOL/L (ref 136–145)
WBC NRBC COR # BLD: 7.23 10*3/MM3 (ref 3.4–10.8)

## 2023-02-13 PROCEDURE — 84165 PROTEIN E-PHORESIS SERUM: CPT

## 2023-02-13 PROCEDURE — 36415 COLL VENOUS BLD VENIPUNCTURE: CPT

## 2023-02-13 PROCEDURE — 86334 IMMUNOFIX E-PHORESIS SERUM: CPT

## 2023-02-13 PROCEDURE — 85025 COMPLETE CBC W/AUTO DIFF WBC: CPT

## 2023-02-13 PROCEDURE — 82784 ASSAY IGA/IGD/IGG/IGM EACH: CPT

## 2023-02-13 PROCEDURE — 83521 IG LIGHT CHAINS FREE EACH: CPT

## 2023-02-13 PROCEDURE — 80053 COMPREHEN METABOLIC PANEL: CPT

## 2023-02-14 LAB
ALBUMIN SERPL ELPH-MCNC: 3.8 G/DL (ref 2.9–4.4)
ALBUMIN/GLOB SERPL: 1.3 {RATIO} (ref 0.7–1.7)
ALPHA1 GLOB SERPL ELPH-MCNC: 0.3 G/DL (ref 0–0.4)
ALPHA2 GLOB SERPL ELPH-MCNC: 0.9 G/DL (ref 0.4–1)
B-GLOBULIN SERPL ELPH-MCNC: 1.1 G/DL (ref 0.7–1.3)
GAMMA GLOB SERPL ELPH-MCNC: 0.8 G/DL (ref 0.4–1.8)
GLOBULIN SER-MCNC: 3.1 G/DL (ref 2.2–3.9)
IGA SERPL-MCNC: 412 MG/DL (ref 61–437)
IGG SERPL-MCNC: 823 MG/DL (ref 603–1613)
IGM SERPL-MCNC: 66 MG/DL (ref 15–143)
INTERPRETATION SERPL IEP-IMP: ABNORMAL
KAPPA LC FREE SER-MCNC: 238.2 MG/L (ref 3.3–19.4)
KAPPA LC FREE/LAMBDA FREE SER: 10.68 {RATIO} (ref 0.26–1.65)
LABORATORY COMMENT REPORT: ABNORMAL
LAMBDA LC FREE SERPL-MCNC: 22.3 MG/L (ref 5.7–26.3)
M PROTEIN SERPL ELPH-MCNC: ABNORMAL G/DL
PROT SERPL-MCNC: 6.9 G/DL (ref 6–8.5)

## 2023-02-16 ENCOUNTER — OFFICE VISIT (OUTPATIENT)
Dept: ONCOLOGY | Facility: HOSPITAL | Age: 88
End: 2023-02-16
Payer: MEDICARE

## 2023-02-16 VITALS
BODY MASS INDEX: 23.73 KG/M2 | TEMPERATURE: 97.8 F | HEART RATE: 60 BPM | OXYGEN SATURATION: 97 % | SYSTOLIC BLOOD PRESSURE: 145 MMHG | RESPIRATION RATE: 16 BRPM | DIASTOLIC BLOOD PRESSURE: 64 MMHG | WEIGHT: 160.72 LBS

## 2023-02-16 DIAGNOSIS — D47.2 MONOCLONAL GAMMOPATHY: Primary | ICD-10-CM

## 2023-02-16 DIAGNOSIS — D50.9 IRON DEFICIENCY ANEMIA, UNSPECIFIED IRON DEFICIENCY ANEMIA TYPE: ICD-10-CM

## 2023-02-16 PROCEDURE — 99214 OFFICE O/P EST MOD 30 MIN: CPT | Performed by: INTERNAL MEDICINE

## 2023-02-16 PROCEDURE — G0463 HOSPITAL OUTPT CLINIC VISIT: HCPCS | Performed by: INTERNAL MEDICINE

## 2023-02-16 NOTE — ASSESSMENT & PLAN NOTE
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  No intervention required at this point.  I will see him back in 6 months for continued surveillance with lab work and skeletal survey prior

## 2023-02-16 NOTE — PROGRESS NOTES
Chief Complaint  mgus    Paco Daniel, Paco Fisher APRN    Subjective          Arvind Coates presents to [facility] for follow-up of MGUS and iron deficiency anemia.  He is status post iron treatments.  He is on observation for MGUS.  On return he is feeling well.  He notes a little fatigue but is able to perform all of his ADLs.  He does exercise routinely.  He reports normal appetite and his weight is maintained.  He denies unusual aches or pains.  No masses or adenopathy.  He reports normal bladder and bowel habits.    Review of Systems   Constitutional: Positive for fatigue. Negative for appetite change, diaphoresis, fever, unexpected weight gain and unexpected weight loss.   HENT: Negative for hearing loss, sore throat and voice change.    Eyes: Negative for blurred vision, double vision, pain, redness and visual disturbance.   Respiratory: Negative for cough, shortness of breath and wheezing.    Cardiovascular: Negative for chest pain, palpitations and leg swelling.   Endocrine: Negative for cold intolerance, heat intolerance, polydipsia and polyuria.   Genitourinary: Negative for decreased urine volume, difficulty urinating, frequency and urinary incontinence.   Musculoskeletal: Negative for arthralgias, back pain, joint swelling and myalgias.   Skin: Negative for color change, rash, skin lesions and wound.   Neurological: Negative for dizziness, seizures, numbness and headache.   Hematological: Negative for adenopathy. Does not bruise/bleed easily.   Psychiatric/Behavioral: Negative for depressed mood. The patient is not nervous/anxious.      Current Outpatient Medications on File Prior to Visit   Medication Sig Dispense Refill   • albuterol sulfate  (90 Base) MCG/ACT inhaler Inhale 2 puffs Every 6 (Six) Hours As Needed.     • aspirin 81 MG chewable tablet aspirin 81 mg oral tablet,chewable chew 1 tablet (81 mg) by oral route once daily   Active      • atorvastatin (LIPITOR) 40 MG tablet atorvastatin 40 mg oral tablet take 1 tablet (40 mg) by oral route once daily at bedtime   Active     • fluticasone (FLONASE) 50 MCG/ACT nasal spray SPRAY 2 SPRAYS IN EACH NOSTRIL BY INTRANASAL ROUTE ONCE DAILY AS NEEDED     • Ipratropium-Albuterol (ALBUTEROL-IPRATROPIUM IN) Duo Neb 3 ml inhalation Q 4 hours as needed   Active     • lisinopril (PRINIVIL,ZESTRIL) 10 MG tablet lisinopril 10 mg oral tablet take 1 tablet (10 mg) by oral route once daily   Active     • metFORMIN ER (GLUCOPHAGE-XR) 500 MG 24 hr tablet Take 1 tablet by mouth Daily With Breakfast. 90 tablet 1   • metoprolol succinate XL (TOPROL-XL) 25 MG 24 hr tablet metoprolol succinate 25 mg oral tablet extended release 24 hr take 1 tablet (25 mg) by oral route once daily for 30 days   Suspended     • metoprolol succinate XL (TOPROL-XL) 50 MG 24 hr tablet Take 1.5 tablets by mouth Daily.     • omeprazole (priLOSEC) 40 MG capsule TAKE 1 CAPSULE BY MOUTH DAILY 30 capsule 3   • warfarin (COUMADIN) 2 MG tablet Take 2 mg by mouth Every Night.       No current facility-administered medications on file prior to visit.       No Known Allergies  Past Medical History:   Diagnosis Date   • Anemia    • Bleeding     Bleeding issues post 1  colonoscopy and during 1 prep- caused to pass out   • Broken bones    • CHF (congestive heart failure) (HCC)    • COVID-19     3/2021   • Hemorrhoids    • Hyperlipidemia    • Hypertension    • Pneumonia    • Pre-diabetes    • Shortness of breath      Past Surgical History:   Procedure Laterality Date   • APPENDECTOMY     • BONE MARROW BIOPSY     • COLONOSCOPY      2012?- normal per patient   • COLONOSCOPY N/A 8/10/2022    Procedure: COLONOSCOPY WITH ORISE INJECTION/POLYPECTOMY /SNARE/CLIP APPLICATION X9;  Surgeon: Steven Holm MD;  Location: [facility] ENDOSCOPY;  Service: Gastroenterology;  Laterality: N/A;  COLON POLYPS  DIVERTICULOSIS   • CORONARY ARTERY BYPASS GRAFT      triple   •  ENDOSCOPY N/A 8/10/2022    Procedure: ESOPHAGOGASTRODUODENOSCOPY WITH BIOPSIES;  Surgeon: Steven Holm MD;  Location: [facility] ENDOSCOPY;  Service: Gastroenterology;  Laterality: N/A;  HIATAL HERNIA  ERIK ULCER   • HEMORRHOIDECTOMY     • UPPER GASTROINTESTINAL ENDOSCOPY       Social History     Socioeconomic History   • Marital status: Single   Tobacco Use   • Smoking status: Never     Passive exposure: Yes   • Smokeless tobacco: Never   Vaping Use   • Vaping Use: Never used   Substance and Sexual Activity   • Alcohol use: Never   • Drug use: Never   • Sexual activity: Defer     Family History   Problem Relation Age of Onset   • Colon cancer Brother 55   • Prostate cancer Brother    • Diabetes Other    • Malig Hyperthermia Neg Hx        Objective   Physical Exam  Vitals reviewed. Exam conducted with a chaperone present.   Constitutional:       General: He is not in acute distress.     Appearance: Normal appearance.   Cardiovascular:      Rate and Rhythm: Normal rate and regular rhythm.      Heart sounds: Normal heart sounds. No murmur heard.    No gallop.   Pulmonary:      Effort: Pulmonary effort is normal.      Breath sounds: Normal breath sounds.   Abdominal:      General: Abdomen is flat. Bowel sounds are normal. There is no distension.      Palpations: Abdomen is soft.      Tenderness: There is no abdominal tenderness.   Musculoskeletal:      Cervical back: Neck supple.      Right lower leg: No edema.      Left lower leg: No edema.   Lymphadenopathy:      Head:      Right side of head: No preauricular or posterior auricular adenopathy.      Left side of head: No preauricular or posterior auricular adenopathy.      Cervical: No cervical adenopathy.      Upper Body:      Right upper body: No supraclavicular, axillary or epitrochlear adenopathy.      Left upper body: No supraclavicular, axillary or epitrochlear adenopathy.   Neurological:      Mental Status: He is alert and oriented to person, place,  and time.   Psychiatric:         Behavior: Behavior normal.         Vitals:    02/16/23 1255   BP: 145/64   Pulse: 60   Resp: 16   Temp: 97.8 °F (36.6 °C)   TempSrc: Temporal   SpO2: 97%   Weight: 72.9 kg (160 lb 11.5 oz)   PainSc: 0-No pain     ECOG score: 0         PHQ-9 Total Score:                      Result Review :   The following data was reviewed by: Molina Shen MD on 02/16/2023:  Lab Results   Component Value Date    HGB 12.1 (L) 02/13/2023    HCT 36.8 (L) 02/13/2023    MCV 97.6 (H) 02/13/2023     02/13/2023    WBC 7.23 02/13/2023    NEUTROABS 4.97 02/13/2023    LYMPHSABS 1.35 02/13/2023    MONOSABS 0.65 02/13/2023    EOSABS 0.22 02/13/2023    BASOSABS 0.03 02/13/2023     Lab Results   Component Value Date    GLUCOSE 173 (H) 02/13/2023    BUN 23 02/13/2023    CREATININE 1.27 02/13/2023     02/13/2023    K 3.8 02/13/2023     02/13/2023    CO2 26.7 02/13/2023    CALCIUM 9.6 02/13/2023    PROTEINTOT 7.1 02/13/2023    ALBUMIN 3.8 02/13/2023    ALBUMIN 4.2 02/13/2023    BILITOT 0.5 02/13/2023    ALKPHOS 79 02/13/2023    AST 18 02/13/2023    ALT 9 02/13/2023     Lab Results   Component Value Date    MG 2.16 04/03/2021    PHOS 3.6 03/25/2021    TSH 2.830 10/08/2021     Lab Results   Component Value Date    IRON 65 10/19/2022    LABIRON 22 10/19/2022    TRANSFERRIN 201 10/19/2022    TIBC 299 10/19/2022     Lab Results   Component Value Date     10/19/2022    FERRITIN 298.80 10/19/2022    IQVDINMX11 623 01/24/2023    FOLATE 15.40 04/21/2022     Lab Results   Component Value Date    PSA 5.940 (H) 10/08/2021     SPEP shows an M spike 0.3 g/dL.  Free light chain ratio 10.68            Assessment and Plan    Diagnoses and all orders for this visit:    1. Monoclonal gammopathy (Primary)  Assessment & Plan:  Lab work demonstrates a very small monoclonal protein but his other tests are normal including hemoglobin, creatinine, calcium.  No intervention required at this point.  I will see him  back in 6 months for continued surveillance with lab work and skeletal survey prior    Orders:  -     CBC & Differential; Future  -     Comprehensive Metabolic Panel; Future  -     ROBERTO & PE, Random Urine - Urine, Clean Catch; Future  -     ROBERTO,PE and FLC, Serum; Future  -     XR Bone Survey Complete; Future    2. Iron deficiency anemia, unspecified iron deficiency anemia type  Assessment & Plan:  Hemoglobin and MCV are normal today.  Repeat CBC next visit            Patient Follow Up: 6 months    Patient was given instructions and counseling regarding his condition or for health maintenance advice. Please see specific information pulled into the AVS if appropriate.     Molina Shen MD    2/16/2023

## 2023-05-03 RX ORDER — OMEPRAZOLE 40 MG/1
40 CAPSULE, DELAYED RELEASE ORAL DAILY
Qty: 30 CAPSULE | Refills: 3 | Status: SHIPPED | OUTPATIENT
Start: 2023-05-03

## 2023-05-10 DIAGNOSIS — E11.65 TYPE 2 DIABETES MELLITUS WITH HYPERGLYCEMIA, WITHOUT LONG-TERM CURRENT USE OF INSULIN: ICD-10-CM

## 2023-05-10 RX ORDER — METFORMIN HYDROCHLORIDE 500 MG/1
500 TABLET, EXTENDED RELEASE ORAL
Qty: 90 TABLET | Refills: 1 | Status: SHIPPED | OUTPATIENT
Start: 2023-05-10

## 2023-07-20 PROBLEM — E11.9 TYPE 2 DIABETES MELLITUS WITHOUT COMPLICATION, WITHOUT LONG-TERM CURRENT USE OF INSULIN: Status: ACTIVE | Noted: 2023-07-20

## 2023-09-07 RX ORDER — OMEPRAZOLE 40 MG/1
40 CAPSULE, DELAYED RELEASE ORAL DAILY
Qty: 30 CAPSULE | Refills: 0 | Status: SHIPPED | OUTPATIENT
Start: 2023-09-07

## 2023-09-07 NOTE — TELEPHONE ENCOUNTER
Caller: [address] [phone] [facility] [phone] [facility]    Relationship: Pharmacy      Requested Prescriptions:   Requested Prescriptions     Pending Prescriptions Disp Refills    omeprazole (priLOSEC) 40 MG capsule 30 capsule 3     Sig: Take 1 capsule by mouth Daily.        Pharmacy where request should be sent: [address] [phone] [facility] [phone] [facility]     Last office visit with prescribing clinician: 7/11/2023   Last telemedicine visit with prescribing clinician: Visit date not found   Next office visit with prescribing clinician: Visit date not found     Additional details provided by patient: PATIENT IS OUT OF MEDICATION     Does the patient have less than a 3 day supply:  [x] Yes  [] No    Would you like a call back once the refill request has been completed: [] Yes [x] No    If the office needs to give you a call back, can they leave a voicemail: [] Yes [x] No    Amy Cash Rep   09/07/23 15:58 EDT

## 2023-10-17 RX ORDER — OMEPRAZOLE 40 MG/1
40 CAPSULE, DELAYED RELEASE ORAL DAILY
Qty: 30 CAPSULE | Refills: 0 | Status: SHIPPED | OUTPATIENT
Start: 2023-10-17

## 2023-10-25 ENCOUNTER — OFFICE VISIT (OUTPATIENT)
Dept: FAMILY MEDICINE CLINIC | Facility: CLINIC | Age: 88
End: 2023-10-25
Payer: MEDICARE

## 2023-10-25 VITALS
WEIGHT: 162.6 LBS | OXYGEN SATURATION: 99 % | TEMPERATURE: 97.7 F | HEIGHT: 69 IN | HEART RATE: 54 BPM | BODY MASS INDEX: 24.08 KG/M2 | SYSTOLIC BLOOD PRESSURE: 136 MMHG | DIASTOLIC BLOOD PRESSURE: 66 MMHG

## 2023-10-25 DIAGNOSIS — E61.1 IRON DEFICIENCY: ICD-10-CM

## 2023-10-25 DIAGNOSIS — Z23 NEED FOR INFLUENZA VACCINATION: ICD-10-CM

## 2023-10-25 DIAGNOSIS — K21.9 GASTROESOPHAGEAL REFLUX DISEASE WITHOUT ESOPHAGITIS: ICD-10-CM

## 2023-10-25 DIAGNOSIS — I48.91 ATRIAL FIBRILLATION, UNSPECIFIED TYPE: ICD-10-CM

## 2023-10-25 DIAGNOSIS — N18.31 STAGE 3A CHRONIC KIDNEY DISEASE: ICD-10-CM

## 2023-10-25 DIAGNOSIS — R97.20 ELEVATED PSA: ICD-10-CM

## 2023-10-25 DIAGNOSIS — E11.65 TYPE 2 DIABETES MELLITUS WITH HYPERGLYCEMIA, WITHOUT LONG-TERM CURRENT USE OF INSULIN: ICD-10-CM

## 2023-10-25 DIAGNOSIS — I10 PRIMARY HYPERTENSION: ICD-10-CM

## 2023-10-25 DIAGNOSIS — E78.2 MIXED HYPERLIPIDEMIA: ICD-10-CM

## 2023-10-25 DIAGNOSIS — Z00.00 MEDICARE ANNUAL WELLNESS VISIT, SUBSEQUENT: Primary | ICD-10-CM

## 2023-10-25 LAB
ALBUMIN SERPL-MCNC: 4.2 G/DL (ref 3.5–5.2)
ALBUMIN/GLOB SERPL: 1.4 G/DL
ALP SERPL-CCNC: 76 U/L (ref 39–117)
ALT SERPL W P-5'-P-CCNC: 15 U/L (ref 1–41)
ANION GAP SERPL CALCULATED.3IONS-SCNC: 10 MMOL/L (ref 5–15)
AST SERPL-CCNC: 20 U/L (ref 1–40)
BASOPHILS # BLD AUTO: 0.03 10*3/MM3 (ref 0–0.2)
BASOPHILS NFR BLD AUTO: 0.4 % (ref 0–1.5)
BILIRUB SERPL-MCNC: 0.6 MG/DL (ref 0–1.2)
BUN SERPL-MCNC: 24 MG/DL (ref 8–23)
BUN/CREAT SERPL: 17.1 (ref 7–25)
CALCIUM SPEC-SCNC: 9.8 MG/DL (ref 8.6–10.5)
CHLORIDE SERPL-SCNC: 106 MMOL/L (ref 98–107)
CO2 SERPL-SCNC: 27 MMOL/L (ref 22–29)
CREAT SERPL-MCNC: 1.4 MG/DL (ref 0.76–1.27)
DEPRECATED RDW RBC AUTO: 45.8 FL (ref 37–54)
EGFRCR SERPLBLD CKD-EPI 2021: 48.3 ML/MIN/1.73
EOSINOPHIL # BLD AUTO: 0.27 10*3/MM3 (ref 0–0.4)
EOSINOPHIL NFR BLD AUTO: 4 % (ref 0.3–6.2)
ERYTHROCYTE [DISTWIDTH] IN BLOOD BY AUTOMATED COUNT: 12.9 % (ref 12.3–15.4)
FERRITIN SERPL-MCNC: 176 NG/ML (ref 30–400)
GLOBULIN UR ELPH-MCNC: 3 GM/DL
GLUCOSE SERPL-MCNC: 95 MG/DL (ref 65–99)
HBA1C MFR BLD: 6.5 % (ref 4.8–5.6)
HCT VFR BLD AUTO: 35.9 % (ref 37.5–51)
HGB BLD-MCNC: 12.4 G/DL (ref 13–17.7)
IMM GRANULOCYTES # BLD AUTO: 0.02 10*3/MM3 (ref 0–0.05)
IMM GRANULOCYTES NFR BLD AUTO: 0.3 % (ref 0–0.5)
IRON 24H UR-MRATE: 69 MCG/DL (ref 59–158)
IRON SATN MFR SERPL: 25 % (ref 20–50)
LYMPHOCYTES # BLD AUTO: 1.6 10*3/MM3 (ref 0.7–3.1)
LYMPHOCYTES NFR BLD AUTO: 24 % (ref 19.6–45.3)
MCH RBC QN AUTO: 33.3 PG (ref 26.6–33)
MCHC RBC AUTO-ENTMCNC: 34.5 G/DL (ref 31.5–35.7)
MCV RBC AUTO: 96.5 FL (ref 79–97)
MONOCYTES # BLD AUTO: 0.66 10*3/MM3 (ref 0.1–0.9)
MONOCYTES NFR BLD AUTO: 9.9 % (ref 5–12)
NEUTROPHILS NFR BLD AUTO: 4.09 10*3/MM3 (ref 1.7–7)
NEUTROPHILS NFR BLD AUTO: 61.4 % (ref 42.7–76)
NRBC BLD AUTO-RTO: 0 /100 WBC (ref 0–0.2)
PLATELET # BLD AUTO: 173 10*3/MM3 (ref 140–450)
PMV BLD AUTO: 11.4 FL (ref 6–12)
POTASSIUM SERPL-SCNC: 4.2 MMOL/L (ref 3.5–5.2)
PROT SERPL-MCNC: 7.2 G/DL (ref 6–8.5)
PSA SERPL-MCNC: 9.25 NG/ML (ref 0–4)
RBC # BLD AUTO: 3.72 10*6/MM3 (ref 4.14–5.8)
SODIUM SERPL-SCNC: 143 MMOL/L (ref 136–145)
TIBC SERPL-MCNC: 276 MCG/DL (ref 298–536)
TRANSFERRIN SERPL-MCNC: 185 MG/DL (ref 200–360)
WBC NRBC COR # BLD: 6.67 10*3/MM3 (ref 3.4–10.8)

## 2023-10-25 PROCEDURE — 83540 ASSAY OF IRON: CPT

## 2023-10-25 PROCEDURE — 80053 COMPREHEN METABOLIC PANEL: CPT

## 2023-10-25 PROCEDURE — 84466 ASSAY OF TRANSFERRIN: CPT

## 2023-10-25 PROCEDURE — 82728 ASSAY OF FERRITIN: CPT

## 2023-10-25 PROCEDURE — 85025 COMPLETE CBC W/AUTO DIFF WBC: CPT

## 2023-10-25 PROCEDURE — 84153 ASSAY OF PSA TOTAL: CPT

## 2023-10-25 PROCEDURE — 83036 HEMOGLOBIN GLYCOSYLATED A1C: CPT

## 2023-10-25 RX ORDER — OMEPRAZOLE 40 MG/1
40 CAPSULE, DELAYED RELEASE ORAL DAILY
Qty: 90 CAPSULE | Refills: 1 | Status: SHIPPED | OUTPATIENT
Start: 2023-10-25

## 2023-10-25 RX ORDER — METFORMIN HYDROCHLORIDE 500 MG/1
500 TABLET, EXTENDED RELEASE ORAL
Qty: 90 TABLET | Refills: 1 | Status: SHIPPED | OUTPATIENT
Start: 2023-10-25

## 2023-10-25 NOTE — PROGRESS NOTES
The ABCs of the Annual Wellness Visit  Subsequent Medicare Wellness Visit    Subjective    Arvind Coates is a 88 y.o. male who presents for a Subsequent Medicare Wellness Visit.    The following portions of the patient's history were reviewed and   updated as appropriate: allergies, current medications, past family history, past medical history, past social history, past surgical history, and problem list.    Compared to one year ago, the patient feels his physical   health is better.    Compared to one year ago, the patient feels his mental   health is better.    Recent Hospitalizations:  He was not admitted to the hospital during the last year.       Current Medical Providers:  Patient Care Team:  Radha Klein APRN as PCP - General (Family Medicine)  Molina Shen MD as Consulting Physician (Hematology and Oncology)    Outpatient Medications Prior to Visit   Medication Sig Dispense Refill    albuterol sulfate  (90 Base) MCG/ACT inhaler Inhale 2 puffs Every 6 (Six) Hours As Needed.      aspirin 81 MG chewable tablet aspirin 81 mg oral tablet,chewable chew 1 tablet (81 mg) by oral route once daily   Active      atorvastatin (LIPITOR) 40 MG tablet atorvastatin 40 mg oral tablet take 1 tablet (40 mg) by oral route once daily at bedtime   Active      Ipratropium-Albuterol (ALBUTEROL-IPRATROPIUM IN) Duo Neb 3 ml inhalation Q 4 hours as needed   Active      lisinopril (PRINIVIL,ZESTRIL) 10 MG tablet lisinopril 10 mg oral tablet take 1 tablet (10 mg) by oral route once daily   Active      metoprolol succinate XL (TOPROL-XL) 25 MG 24 hr tablet metoprolol succinate 25 mg oral tablet extended release 24 hr take 1 tablet (25 mg) by oral route once daily for 30 days   Suspended      metoprolol succinate XL (TOPROL-XL) 50 MG 24 hr tablet Take 1.5 tablets by mouth Daily.      warfarin (COUMADIN) 2 MG tablet Take 1 tablet by mouth Every Night.      metFORMIN ER (GLUCOPHAGE-XR) 500 MG 24 hr tablet Take 1  "tablet by mouth Daily With Breakfast. 90 tablet 1    omeprazole (priLOSEC) 40 MG capsule Take 1 capsule by mouth Daily. 30 capsule 0    fluticasone (FLONASE) 50 MCG/ACT nasal spray SPRAY 2 SPRAYS IN EACH NOSTRIL BY INTRANASAL ROUTE ONCE DAILY AS NEEDED (Patient not taking: Reported on 10/25/2023)       No facility-administered medications prior to visit.       No opioid medication identified on active medication list. I have reviewed chart for other potential  high risk medication/s and harmful drug interactions in the elderly.        Aspirin is on active medication list. Aspirin use is indicated based on review of current medical condition/s. Pros and cons of this therapy have been discussed today. Benefits of this medication outweigh potential harm.  Patient has been encouraged to continue taking this medication.  .      Patient Active Problem List   Diagnosis    Atrial fibrillation    Cardiac pacemaker in situ    CHF (congestive heart failure)    Coronary atherosclerosis    Hemorrhoids    History of aortic valve replacement    Hyperlipidemia    Hypertension    Personal history of other drug therapy    Presence of aortocoronary bypass graft    Sick sinus syndrome    Supraventricular tachycardia    Stage 3a chronic kidney disease    Other specified anemias    Malabsorption due to intolerance, not elsewhere classified    Iron deficiency anemia    Weight loss    Positive occult stool blood test    Lower abdominal pain    Monoclonal gammopathy    Type 2 diabetes mellitus without complication, without long-term current use of insulin     Advance Care Planning   Advance Care Planning     Advance Directive is not on file.  ACP discussion was held with the patient during this visit. Patient has an advance directive (not in EMR), copy requested.     Objective    Vitals:    10/25/23 1040   BP: 136/66   Pulse: 54   Temp: 97.7 °F (36.5 °C)   SpO2: 99%   Weight: 73.8 kg (162 lb 9.6 oz)   Height: 175.3 cm (69\")     Estimated " "body mass index is 24.01 kg/m² as calculated from the following:    Height as of this encounter: 175.3 cm (69\").    Weight as of this encounter: 73.8 kg (162 lb 9.6 oz).    BMI is within normal parameters. No other follow-up for BMI required.      Does the patient have evidence of cognitive impairment? No          HEALTH RISK ASSESSMENT    Smoking Status:  Social History     Tobacco Use   Smoking Status Never    Passive exposure: Yes   Smokeless Tobacco Never     Alcohol Consumption:  Social History     Substance and Sexual Activity   Alcohol Use Never     Fall Risk Screen:    STEADI Fall Risk Assessment was completed, and patient is at LOW risk for falls.Assessment completed on:7/11/2023    Depression Screening:      10/25/2023    10:00 AM   PHQ-2/PHQ-9 Depression Screening   Little Interest or Pleasure in Doing Things 0-->not at all   Feeling Down, Depressed or Hopeless 0-->not at all   PHQ-9: Brief Depression Severity Measure Score 0       Health Habits and Functional and Cognitive Screening:      10/25/2023    10:00 AM   Functional & Cognitive Status   Do you have difficulty preparing food and eating? No   Do you have difficulty bathing yourself, getting dressed or grooming yourself? No   Do you have difficulty using the toilet? No   Do you have difficulty moving around from place to place? No   Do you have trouble with steps or getting out of a bed or a chair? No   Current Diet Well Balanced Diet   Dental Exam Not up to date   Eye Exam Not up to date   Exercise (times per week) 7 times per week   Current Exercises Include Walking;Yard Work   Do you need help using the phone?  No   Are you deaf or do you have serious difficulty hearing?  No   Do you need help to go to places out of walking distance? No   Do you need help shopping? No   Do you need help preparing meals?  No   Do you need help with housework?  No   Do you need help with laundry? No   Do you need help taking your medications? No   Do you need help " managing money? No   Do you ever drive or ride in a car without wearing a seat belt? No   Have you felt unusual stress, anger or loneliness in the last month? No   Who do you live with? Other   If you need help, do you have trouble finding someone available to you? No   Have you been bothered in the last four weeks by sexual problems? No   Do you have difficulty concentrating, remembering or making decisions? No       Age-appropriate Screening Schedule:  Refer to the list below for future screening recommendations based on patient's age, sex and/or medical conditions. Orders for these recommended tests are listed in the plan section. The patient has been provided with a written plan.    Health Maintenance   Topic Date Due    TDAP/TD VACCINES (1 - Tdap) Never done    ZOSTER VACCINE (1 of 2) Never done    DIABETIC EYE EXAM  06/16/2022    URINE MICROALBUMIN  07/12/2023    INFLUENZA VACCINE  08/01/2023    COVID-19 Vaccine (5 - 2023-24 season) 09/01/2023    HEMOGLOBIN A1C  01/11/2024    LIPID PANEL  07/11/2024    ANNUAL WELLNESS VISIT  10/25/2024    Pneumococcal Vaccine 65+  Completed                  CMS Preventative Services Quick Reference  Risk Factors Identified During Encounter  Immunizations Discussed/Encouraged: Tdap and Shingrix  Dental Screening Recommended  Vision Screening Recommended  The above risks/problems have been discussed with the patient.  Pertinent information has been shared with the patient in the After Visit Summary.  An After Visit Summary and PPPS were made available to the patient.    Follow Up:   Next Medicare Wellness visit to be scheduled in 1 year.       Additional E&M Note during same encounter follows:  Patient has multiple medical problems which are significant and separately identifiable that require additional work above and beyond the Medicare Wellness Visit.      Chief Complaint  Establish Care    Subjective        Arvind Coates, date of birth 1935, comes in today for transition  "of care from previous PCP to follow up on chronic conditions and for Medicare annual wellness exam.    He used to consult Dr. Kramer for his blood pressure, cholesterol, diabetes mellitus and kidney function. He consults with Dr. Jermaine Jackson. He has an upcoming appointment with Dr. Brand. He reports his breathing trouble started in 2022. He reports taking warfarin for his atrial fibrillation. He uses lisinopril for his blood pressure management. He takes baby aspirin.    He takes metformin for his diabetes mellitus.    He uses atorvastatin for his cholesterol.    The patient reports that his physical health and mental health is better compared to 1 year ago. He had a COVID-19 infection 1 year ago. He reports that his lower and upper extremity hair \"peeled off.\"  He denies seeing a dermatologist.    He reports that he must urinate during the night. He denies dribbling urine.     He wans to get his influenza vaccine    Objective   Vital Signs:  /66   Pulse 54   Temp 97.7 °F (36.5 °C)   Ht 175.3 cm (69\")   Wt 73.8 kg (162 lb 9.6 oz)   SpO2 99%   BMI 24.01 kg/m²     Physical Exam  Vitals reviewed.   Constitutional:       Appearance: Normal appearance. He is well-developed.   HENT:      Head: Normocephalic and atraumatic.   Eyes:      Conjunctiva/sclera: Conjunctivae normal.      Pupils: Pupils are equal, round, and reactive to light.   Cardiovascular:      Rate and Rhythm: Normal rate and regular rhythm. Bradycardia present.      Heart sounds: Murmur heard.      No friction rub. No gallop.   Pulmonary:      Effort: Pulmonary effort is normal.      Breath sounds: Normal breath sounds. No wheezing or rhonchi.   Abdominal:      General: Bowel sounds are normal. There is no distension.      Palpations: Abdomen is soft.      Tenderness: There is no abdominal tenderness.   Musculoskeletal:      Right lower leg: No edema.      Left lower leg: No edema.   Skin:     General: Skin is warm and dry.      Findings: " Lesion present.   Neurological:      Mental Status: He is alert and oriented to person, place, and time.      Cranial Nerves: No cranial nerve deficit.   Psychiatric:         Mood and Affect: Mood and affect normal.         Behavior: Behavior normal.         Thought Content: Thought content normal.         Judgment: Judgment normal.                           Assessment and Plan   Diagnoses and all orders for this visit:    1. Medicare annual wellness visit, subsequent (Primary)    2. Type 2 diabetes mellitus with hyperglycemia, without long-term current use of insulin  -     Microalbumin / Creatinine Urine Ratio - Urine, Clean Catch  -     CBC & Differential  -     Comprehensive Metabolic Panel  -     Hemoglobin A1c  -     metFORMIN ER (GLUCOPHAGE-XR) 500 MG 24 hr tablet; Take 1 tablet by mouth Daily With Breakfast.  Dispense: 90 tablet; Refill: 1    3. Stage 3a chronic kidney disease    4. Primary hypertension  -     CBC & Differential  -     Comprehensive Metabolic Panel    5. Mixed hyperlipidemia  -     CBC & Differential  -     Comprehensive Metabolic Panel    6. Atrial fibrillation, unspecified type    7. Elevated PSA  -     PSA DIAGNOSTIC    8. Gastroesophageal reflux disease without esophagitis  -     omeprazole (priLOSEC) 40 MG capsule; Take 1 capsule by mouth Daily.  Dispense: 90 capsule; Refill: 1    9. Iron deficiency  -     Iron Profile  -     Ferritin    10. Need for influenza vaccination  -     Fluzone High-Dose 65+yrs (2632-3510)      1. Facial lesion  - Patient provided bacitracin ointment.    2. Health  - The patient will receive his influenza vaccine today.    3.  Elevated PSA  -Previous PCP found PSA to be elevated.  Patient denies any current symptoms.  Diagnostic PSA will be done today.    4.  Hypertension/hyperlipidemia/type 2 diabetes  -Continue current medications, no changes to any doses at this time.  He will follow-up with cardiology as scheduled.       Follow Up   Return in about 3 months  (around 1/25/2024) for Next scheduled follow up.  Patient was given instructions and counseling regarding his condition or for health maintenance advice. Please see specific information pulled into the AVS if appropriate.       Transcribed from ambient dictation for OLIVIA Nazario by Gentry Waddell.  10/25/23   13:52 EDT    Patient or patient representative verbalized consent to the visit recording.  I have personally performed the services described in this document as transcribed by the above individual, and it is both accurate and complete.

## 2023-11-02 DIAGNOSIS — R97.20 ELEVATED PSA: Primary | ICD-10-CM

## 2023-11-13 ENCOUNTER — APPOINTMENT (OUTPATIENT)
Dept: GENERAL RADIOLOGY | Facility: HOSPITAL | Age: 88
End: 2023-11-13
Payer: MEDICARE

## 2023-11-13 ENCOUNTER — HOSPITAL ENCOUNTER (EMERGENCY)
Facility: HOSPITAL | Age: 88
Discharge: HOME | End: 2023-11-13
Attending: EMERGENCY MEDICINE | Admitting: EMERGENCY MEDICINE
Payer: MEDICARE

## 2023-11-13 VITALS
BODY MASS INDEX: 23.74 KG/M2 | WEIGHT: 160.27 LBS | HEIGHT: 69 IN | HEART RATE: 72 BPM | RESPIRATION RATE: 18 BRPM | OXYGEN SATURATION: 100 % | SYSTOLIC BLOOD PRESSURE: 163 MMHG | DIASTOLIC BLOOD PRESSURE: 81 MMHG | TEMPERATURE: 99.2 F

## 2023-11-13 DIAGNOSIS — L03.113 CELLULITIS OF RIGHT ARM: Primary | ICD-10-CM

## 2023-11-13 PROCEDURE — 99282 EMERGENCY DEPT VISIT SF MDM: CPT

## 2023-11-13 PROCEDURE — 73090 X-RAY EXAM OF FOREARM: CPT

## 2023-11-13 RX ORDER — CEPHALEXIN 500 MG/1
500 CAPSULE ORAL 3 TIMES DAILY
Qty: 21 CAPSULE | Refills: 0 | Status: SHIPPED | OUTPATIENT
Start: 2023-11-13 | End: 2023-11-20

## 2023-11-13 NOTE — DISCHARGE INSTRUCTIONS
Please take the full course of antibiotics as directed.  Return to the emergency department if you develop fever or any other symptoms concerning to you.  Please follow-up with your primary care provider in 3 to 4 days for reevaluation.

## 2023-11-13 NOTE — ED PROVIDER NOTES
"Time: 5:48 PM EST  Date of encounter:  11/13/2023  Independent Historian/Clinical History and Information was obtained by:   Patient and Family    History is limited by: N/A    Chief Complaint: Right arm pain      History of Present Illness:  Patient is a 88 y.o. year old male who presents to the emergency department for evaluation of right arm pain.  Patient states 2 days ago he tripped over a dog trying to untangle its leash and fell onto his right arm.  Patient states since that time he has had swelling of his right arm.  Patient states that he is having \"a fever in my arm\".  Patient does state he has a lot of bruising and believes this is because he takes daily blood thinners.  Patient states he did have abrasions on his elbow and wrist but he states that they were only skin tears.  Patient has not had any fever.  Patient's not having difficulty with range of motion.    HPI    Patient Care Team  Primary Care Provider: Radha Klein APRN    Past Medical History:     No Known Allergies  Past Medical History:   Diagnosis Date    Anemia     Bleeding     Bleeding issues post 1  colonoscopy and during 1 prep- caused to pass out    Broken bones     CHF (congestive heart failure)     COVID-19     3/2021    Hemorrhoids     Hyperlipidemia     Hypertension     Pneumonia     Pre-diabetes     Shortness of breath      Past Surgical History:   Procedure Laterality Date    APPENDECTOMY      BONE MARROW BIOPSY      COLONOSCOPY      2012?- normal per patient    COLONOSCOPY N/A 8/10/2022    Procedure: COLONOSCOPY WITH ORISE INJECTION/POLYPECTOMY /SNARE/CLIP APPLICATION X9;  Surgeon: Steven Holm MD;  Location: [facility] ENDOSCOPY;  Service: Gastroenterology;  Laterality: N/A;  COLON POLYPS  DIVERTICULOSIS    CORONARY ARTERY BYPASS GRAFT      triple    ENDOSCOPY N/A 8/10/2022    Procedure: ESOPHAGOGASTRODUODENOSCOPY WITH BIOPSIES;  Surgeon: Steven Holm MD;  Location: [facility] ENDOSCOPY;  Service: " Gastroenterology;  Laterality: N/A;  HIATAL HERNIA  ERIK ULCER    HEMORRHOIDECTOMY      UPPER GASTROINTESTINAL ENDOSCOPY       Family History   Problem Relation Age of Onset    Colon cancer Brother 55    Prostate cancer Brother     Diabetes Other     Malig Hyperthermia Neg Hx        Home Medications:  Prior to Admission medications    Medication Sig Start Date End Date Taking? Authorizing Provider   albuterol sulfate  (90 Base) MCG/ACT inhaler Inhale 2 puffs Every 6 (Six) Hours As Needed. 5/5/21   Jweels Love MD   aspirin 81 MG chewable tablet aspirin 81 mg oral tablet,chewable chew 1 tablet (81 mg) by oral route once daily   Active 4/4/21   Jewels Love MD   atorvastatin (LIPITOR) 40 MG tablet atorvastatin 40 mg oral tablet take 1 tablet (40 mg) by oral route once daily at bedtime   Active 3/2/21   Jewels Love MD   Ipratropium-Albuterol (ALBUTEROL-IPRATROPIUM IN) Duo Neb 3 ml inhalation Q 4 hours as needed   Active    Jewels Love MD   lisinopril (PRINIVIL,ZESTRIL) 10 MG tablet lisinopril 10 mg oral tablet take 1 tablet (10 mg) by oral route once daily   Active 2/5/21   Jewels Love MD   metFORMIN ER (GLUCOPHAGE-XR) 500 MG 24 hr tablet Take 1 tablet by mouth Daily With Breakfast. 10/25/23   Radha Klein APRN   metoprolol succinate XL (TOPROL-XL) 25 MG 24 hr tablet metoprolol succinate 25 mg oral tablet extended release 24 hr take 1 tablet (25 mg) by oral route once daily for 30 days   Suspended 4/4/21   Jewels Love MD   metoprolol succinate XL (TOPROL-XL) 50 MG 24 hr tablet Take 1.5 tablets by mouth Daily. 7/27/22   Jewels Love MD   omeprazole (priLOSEC) 40 MG capsule Take 1 capsule by mouth Daily. 10/25/23   Radha Klein APRN   warfarin (COUMADIN) 2 MG tablet Take 1 tablet by mouth Every Night. 3/9/21   Jewels Love MD        Social History:   Social History     Tobacco Use    Smoking status: Never      "Passive exposure: Yes    Smokeless tobacco: Never   Vaping Use    Vaping Use: Never used   Substance Use Topics    Alcohol use: Never    Drug use: Never         Review of Systems:  Review of Systems   Constitutional:  Negative for fever.   Musculoskeletal:  Positive for arthralgias.   Skin:  Positive for wound.   All other systems reviewed and are negative.       Physical Exam:  /81 (BP Location: Right arm, Patient Position: Sitting)   Pulse 72   Temp 99.2 °F (37.3 °C) (Oral)   Resp 18   Ht 175.3 cm (69\")   Wt 72.7 kg (160 lb 4.4 oz)   SpO2 100%   BMI 23.67 kg/m²     Physical Exam  Vitals and nursing note reviewed.   Constitutional:       Appearance: Normal appearance. He is normal weight.   HENT:      Head: Normocephalic and atraumatic.      Nose: Nose normal.   Eyes:      Conjunctiva/sclera: Conjunctivae normal.      Pupils: Pupils are equal, round, and reactive to light.   Cardiovascular:      Rate and Rhythm: Normal rate and regular rhythm.      Heart sounds: Normal heart sounds.   Pulmonary:      Effort: Pulmonary effort is normal.      Breath sounds: Normal breath sounds.   Abdominal:      General: Abdomen is flat. There is no distension.   Musculoskeletal:         General: Normal range of motion.        Arms:       Cervical back: Normal range of motion and neck supple.      Comments: Radial pulse 2+   Skin:     General: Skin is warm and dry.   Neurological:      General: No focal deficit present.      Mental Status: He is alert and oriented to person, place, and time.   Psychiatric:         Mood and Affect: Mood normal.         Behavior: Behavior normal.         Thought Content: Thought content normal.         Judgment: Judgment normal.                Procedures:  Procedures      Medical Decision Making:    Comorbidities that affect care:    CHF, hypertension, hyperlipidemia    External Notes reviewed:    Previous Clinic Note: Family medicine office visit from 10- where patient was seen " for his annual Medicare exam      The following orders were placed and all results were independently analyzed by me:  Orders Placed This Encounter   Procedures    XR Forearm 2 View Right    NPO Diet NPO Type: Strict NPO    Undress & Gown (If Required to Visualize Injury)    Elevate Extremity / Area of Injury    Apply Ice to Affected Area       Medications Given in the Emergency Department:  Medications - No data to display     ED Course:    ED Course as of 11/13/23 1805   Mon Nov 13, 2023   1726 I independently reviewed x-ray of the right forearm and did not note any bony abnormalities [MD]      ED Course User Index  [MD] Solo Diaz PA-C       Labs:    Lab Results (last 24 hours)       ** No results found for the last 24 hours. **             Imaging:    XR Forearm 2 View Right    Result Date: 11/13/2023  PROCEDURE: XR FOREARM 2 VW RIGHT  COMPARISON: None  INDICATIONS: FELL A COUPLE DAYS AGO COMPLAINS OF RIGHT ARM PAIN  FINDINGS:  BONES: Normal.  No significant arthropathy or acute abnormality.  SOFT TISSUES: There is generalized soft tissue swelling. EFFUSION: None visible.  OTHER: Atherosclerotic calcification is present.       Generalized soft tissue swelling.  No acute osseous abnormalities are identified.      GREGORIO DUKES MD       Electronically Signed and Approved By: GREGORIO DUKES MD on 11/13/2023 at 16:55                Differential Diagnosis and Discussion:    Extremity Pain: Differential diagnosis includes but is not limited to soft tissue sprain, tendonitis, tendon injury, dislocation, fracture, deep vein thrombosis, arterial insufficiency, osteoarthritis, bursitis, and ligamentous damage.    All X-rays impressions were independently interpreted by me.    MDM  Number of Diagnoses or Management Options  Diagnosis management comments: Patient presented to the emergency department today for evaluation of right arm pain after a fall.  X-ray of the right forearm was obtained is negative for  any fracture.  There is erythema and swelling along with contusion noted of the right forearm and I am concerned for cellulitis I will begin patient on antibiotics and have him follow-up with his primary care provider on Friday of this week.  Patient was given return to the emergency department guidelines.       Amount and/or Complexity of Data Reviewed  Tests in the radiology section of CPT®: reviewed and ordered    Risk of Complications, Morbidity, and/or Mortality  Presenting problems: moderate  Diagnostic procedures: low  Management options: low    Patient Progress  Patient progress: stable         Consultants/Shared Management Plan:    None    Social Determinants of Health:    Patient is independent, reliable, and has access to care.       Disposition and Care Coordination:    Discharged: The patient is suitable and stable for discharge with no need for consideration of observation or admission.    I have explained the patient´s condition, diagnoses and treatment plan based on the information available to me at this time. I have answered questions and addressed any concerns. The patient has a good  understanding of the patient´s diagnosis, condition, and treatment plan as can be expected at this point. The vital signs have been stable. The patient´s condition is stable and appropriate for discharge from the emergency department.      The patient will pursue further outpatient evaluation with the primary care physician or other designated or consulting physician as outlined in the discharge instructions. They are agreeable to this plan of care and follow-up instructions have been explained in detail. The patient has received these instructions in written format and have expressed an understanding of the discharge instructions. The patient is aware that any significant change in condition or worsening of symptoms should prompt an immediate return to this or the closest emergency department or call to 911.  I have  explained discharge medications and the need for follow up with the patient/caretakers. This was also printed in the discharge instructions. Patient was discharged with the following medications and follow up:      Medication List        New Prescriptions      cephalexin 500 MG capsule  Commonly known as: KEFLEX  Take 1 capsule by mouth 3 (Three) Times a Day for 7 days.               Where to Get Your Medications        These medications were sent to [address] [phone] [facility] [phone] [address] [address]      Phone: [phone]   cephalexin 500 MG capsule      Radha Klein, APRN  [address] [facility] [address]  [facility] [address] [phone]    Schedule an appointment as soon as possible for a visit          Final diagnoses:   Cellulitis of right arm        ED Disposition       ED Disposition   Discharge    Condition   Stable    Comment   --               This medical record created using voice recognition software.             Solo Diaz PA-C  11/13/23 8565

## 2023-11-14 ENCOUNTER — PATIENT OUTREACH (OUTPATIENT)
Dept: CASE MANAGEMENT | Facility: OTHER | Age: 88
End: 2023-11-14
Payer: MEDICARE

## 2023-11-14 NOTE — OUTREACH NOTE
AMBULATORY CASE MANAGEMENT NOTE    Name and Relationship of Patient/Support Person: Arvind Coates G - Self  Self    Patient Outreach    Outgoing call to the patient for ED visit follow up.  Introduced self and explained role and purpose of outreach.  He declines assistance offered to make his PCP follow up appt.  Discussed recent visit and he denies needs.  Discussed CCM program and services.  He declines at this time and is not interested.     Tomasa GUILLAUME  Ambulatory Case Management    11/14/2023, 12:19 EST

## 2023-11-30 ENCOUNTER — OFFICE VISIT (OUTPATIENT)
Dept: FAMILY MEDICINE CLINIC | Facility: CLINIC | Age: 88
End: 2023-11-30
Payer: MEDICARE

## 2023-11-30 VITALS
OXYGEN SATURATION: 96 % | TEMPERATURE: 98.1 F | HEART RATE: 69 BPM | DIASTOLIC BLOOD PRESSURE: 60 MMHG | BODY MASS INDEX: 22.96 KG/M2 | WEIGHT: 155 LBS | HEIGHT: 69 IN | SYSTOLIC BLOOD PRESSURE: 116 MMHG

## 2023-11-30 DIAGNOSIS — S49.91XS ARM INJURY, RIGHT, SEQUELA: ICD-10-CM

## 2023-11-30 DIAGNOSIS — M70.21 OLECRANON BURSITIS OF RIGHT ELBOW: Primary | ICD-10-CM

## 2023-11-30 NOTE — PROGRESS NOTES
Chief Complaint  Chief Complaint   Patient presents with    Arm Pain    Follow-up       Subjective      Arvind Coates presents to [facility]    The patient is an 88-year-old male who comes in today for a follow-up visit where he was seen in the ED for cellulitis of his right arm after falling at home.    He tripped over his granddaughter's big dog and fell on 11/11/2023 and went to the ER on 11/13/2023. He hurt his right upper extremity. It was edematous and erythematous when he went to the ER. It is better now. He scraped it on the outside of his right elbow on the concrete. It did not bruise all the way down to his fingers. It is tender and sore. They did an x-ray, and they could not find anything. One of the nurses told him that his elbow was out of place. He was given a prescription for antibiotics in the ER. He has completed course of antibiotics. He took Tylenol as needed for pain. It feels sore to touch but is 90 percent better than it was.    He is on anticoagulation.     Objective     Medical History:  Past Medical History:   Diagnosis Date    Anemia     Bleeding     Bleeding issues post 1  colonoscopy and during 1 prep- caused to pass out    Broken bones     CHF (congestive heart failure)     COVID-19     3/2021    Hemorrhoids     Hyperlipidemia     Hypertension     Pneumonia     Pre-diabetes     Shortness of breath      Past Surgical History:   Procedure Laterality Date    APPENDECTOMY      BONE MARROW BIOPSY      COLONOSCOPY      2012?- normal per patient    COLONOSCOPY N/A 8/10/2022    Procedure: COLONOSCOPY WITH ORISE INJECTION/POLYPECTOMY /SNARE/CLIP APPLICATION X9;  Surgeon: Steven Holm MD;  Location: [facility] ENDOSCOPY;  Service: Gastroenterology;  Laterality: N/A;  COLON POLYPS  DIVERTICULOSIS    CORONARY ARTERY BYPASS GRAFT      triple    ENDOSCOPY N/A 8/10/2022    Procedure: ESOPHAGOGASTRODUODENOSCOPY WITH BIOPSIES;  Surgeon: Steven Holm MD;   Location: [facility] ENDOSCOPY;  Service: Gastroenterology;  Laterality: N/A;  HIATAL HERNIA  ERIK ULCER    HEMORRHOIDECTOMY      UPPER GASTROINTESTINAL ENDOSCOPY        Social History     Tobacco Use    Smoking status: Never     Passive exposure: Yes    Smokeless tobacco: Never   Vaping Use    Vaping Use: Never used   Substance Use Topics    Alcohol use: Never    Drug use: Never     Family History   Problem Relation Age of Onset    Colon cancer Brother 55    Prostate cancer Brother     Diabetes Other     Malig Hyperthermia Neg Hx        Medications:  Prior to Admission medications    Medication Sig Start Date End Date Taking? Authorizing Provider   albuterol sulfate  (90 Base) MCG/ACT inhaler Inhale 2 puffs Every 6 (Six) Hours As Needed. 5/5/21  Yes Jewels Love MD   aspirin 81 MG chewable tablet aspirin 81 mg oral tablet,chewable chew 1 tablet (81 mg) by oral route once daily   Active 4/4/21  Yes ProviderJewels MD   atorvastatin (LIPITOR) 40 MG tablet atorvastatin 40 mg oral tablet take 1 tablet (40 mg) by oral route once daily at bedtime   Active 3/2/21  Yes ProviderJewels MD   Ipratropium-Albuterol (ALBUTEROL-IPRATROPIUM IN) Duo Neb 3 ml inhalation Q 4 hours as needed   Active   Yes ProviderJewels MD   lisinopril (PRINIVIL,ZESTRIL) 10 MG tablet lisinopril 10 mg oral tablet take 1 tablet (10 mg) by oral route once daily   Active 2/5/21  Yes ProviderJewels MD   metFORMIN ER (GLUCOPHAGE-XR) 500 MG 24 hr tablet Take 1 tablet by mouth Daily With Breakfast. 10/25/23  Yes Radha Klein APRN   metoprolol succinate XL (TOPROL-XL) 25 MG 24 hr tablet metoprolol succinate 25 mg oral tablet extended release 24 hr take 1 tablet (25 mg) by oral route once daily for 30 days   Suspended 4/4/21  Yes ProviderJewels MD   metoprolol succinate XL (TOPROL-XL) 50 MG 24 hr tablet Take 1.5 tablets by mouth Daily. 7/27/22  Yes Jewels Love MD   omeprazole (priLOSEC) 40  "MG capsule Take 1 capsule by mouth Daily. 10/25/23  Yes Radha Klein APRN   warfarin (COUMADIN) 2 MG tablet Take 1 tablet by mouth Every Night. 3/9/21  Yes Provider, MD Jewels        Allergies:   Patient has no known allergies.    Health Maintenance Due   Topic Date Due    TDAP/TD VACCINES (1 - Tdap) Never done    ZOSTER VACCINE (1 of 2) Never done    DIABETIC EYE EXAM  06/16/2022    URINE MICROALBUMIN  07/12/2023    COVID-19 Vaccine (5 - 2023-24 season) 09/01/2023         Vital Signs:   /60   Pulse 69   Temp 98.1 °F (36.7 °C)   Ht 175.3 cm (69\")   Wt 70.3 kg (155 lb)   SpO2 96%   BMI 22.89 kg/m²     Wt Readings from Last 3 Encounters:   11/30/23 70.3 kg (155 lb)   11/13/23 72.7 kg (160 lb 4.4 oz)   10/25/23 73.8 kg (162 lb 9.6 oz)     BP Readings from Last 3 Encounters:   11/30/23 116/60   11/13/23 163/81   10/25/23 136/66       BMI is within normal parameters. No other follow-up for BMI required.       Physical Exam  Vitals reviewed.   Constitutional:       Appearance: Normal appearance. He is well-developed.   HENT:      Head: Normocephalic and atraumatic.   Cardiovascular:      Rate and Rhythm: Normal rate and regular rhythm.   Pulmonary:      Effort: Pulmonary effort is normal.   Musculoskeletal:      Right elbow: Swelling and effusion present. No tenderness.   Skin:     General: Skin is warm and dry.   Neurological:      Mental Status: He is alert and oriented to person, place, and time.   Psychiatric:         Mood and Affect: Affect normal.               Result Review :    The following data was reviewed by OLIVIA Nazario on 11/30/23 at 14:47 EST:        XR Forearm 2 View Right    Result Date: 11/13/2023   Generalized soft tissue swelling.  No acute osseous abnormalities are identified.      GREGORIO DUKES MD       Electronically Signed and Approved By: GREGORIO DUKES MD on 11/13/2023 at 16:55                          Assessment and Plan    Diagnoses and all orders " for this visit:    1. Olecranon bursitis of right elbow (Primary)    2. Arm injury, right, sequela       Olecranon bursitis  Reviewed x-ray with patient which showed generalized soft tissue swelling and no abnormalities or fractures noted.  The patient was advised to monitor for any redness, increase in pain, decreased range of motion, fevers related to olecranon bursitis of his right elbow. If this occurs, he will follow up with me or notify me via MyChart for referral over to orthopedic surgery for aspiration of the bursa.    Patient advised to use Tylenol 500 mg every 6 hours as needed for pain relief.          Follow Up   No follow-ups on file.  Patient was given instructions and counseling regarding his condition or for health maintenance advice. Please see specific information pulled into the AVS if appropriate.     Please note that portions of this note were completed with a voice recognition program.      Transcribed from ambient dictation for OLIVIA Nazario by Lauren Saha.  11/30/23   19:20 EST    Patient or patient representative verbalized consent to the visit recording.  I have personally performed the services described in this document as transcribed by the above individual, and it is both accurate and complete.

## 2024-01-31 ENCOUNTER — OFFICE VISIT (OUTPATIENT)
Dept: FAMILY MEDICINE CLINIC | Facility: CLINIC | Age: 89
End: 2024-01-31
Payer: MEDICARE

## 2024-01-31 VITALS
WEIGHT: 157.4 LBS | OXYGEN SATURATION: 100 % | DIASTOLIC BLOOD PRESSURE: 54 MMHG | BODY MASS INDEX: 23.31 KG/M2 | HEIGHT: 69 IN | SYSTOLIC BLOOD PRESSURE: 126 MMHG | HEART RATE: 68 BPM | TEMPERATURE: 97.5 F

## 2024-01-31 DIAGNOSIS — N18.31 TYPE 2 DIABETES MELLITUS WITH STAGE 3A CHRONIC KIDNEY DISEASE, WITHOUT LONG-TERM CURRENT USE OF INSULIN: ICD-10-CM

## 2024-01-31 DIAGNOSIS — I10 PRIMARY HYPERTENSION: Primary | ICD-10-CM

## 2024-01-31 DIAGNOSIS — D50.9 IRON DEFICIENCY ANEMIA, UNSPECIFIED IRON DEFICIENCY ANEMIA TYPE: ICD-10-CM

## 2024-01-31 DIAGNOSIS — D47.2 MONOCLONAL GAMMOPATHY: ICD-10-CM

## 2024-01-31 DIAGNOSIS — E78.2 MIXED HYPERLIPIDEMIA: ICD-10-CM

## 2024-01-31 DIAGNOSIS — J43.9 PULMONARY EMPHYSEMA, UNSPECIFIED EMPHYSEMA TYPE: ICD-10-CM

## 2024-01-31 DIAGNOSIS — E11.22 TYPE 2 DIABETES MELLITUS WITH STAGE 3A CHRONIC KIDNEY DISEASE, WITHOUT LONG-TERM CURRENT USE OF INSULIN: ICD-10-CM

## 2024-01-31 DIAGNOSIS — I48.91 ATRIAL FIBRILLATION, UNSPECIFIED TYPE: ICD-10-CM

## 2024-01-31 LAB
ALBUMIN UR-MCNC: 9.7 MG/DL
CREAT UR-MCNC: 182.4 MG/DL
MICROALBUMIN/CREAT UR: 53.2 MG/G (ref 0–29)

## 2024-01-31 PROCEDURE — 82043 UR ALBUMIN QUANTITATIVE: CPT

## 2024-01-31 PROCEDURE — 82570 ASSAY OF URINE CREATININE: CPT

## 2024-01-31 NOTE — PROGRESS NOTES
Chief Complaint  Chief Complaint   Patient presents with    Arm Pain    Follow-up       Subjective      Arvind Coates presents to [facility]  The patient is an 88-year-old male who comes in today for a follow-up visit.    He sees a cardiologist, Dr. Jackson. He did see a pulmonologist, but he has not followed up recently. He uses his inhalers as needed. He uses his oxygen occasionally when he really feels like he needs it. He saw Dr. Plascencia about a year ago for gammopathy and iron deficiency. He had an IV iron infusion, which helped. He does not feel like he needs it now. He gets short of breath depending on what he is doing. [place] and walks down to the mailbox. When he gets down and backs up, he is breathing hard; however, he recovers quickly. He does not feel wheezy.    He has loose teeth. He went to [facility] in [place]; however, they told him that if he had [employer] insurance, it would be okay. His tooth is not hurting right now; however, it got loose.    He had his eyes checked again. He got new glasses a couple of years ago.    Objective     Medical History:  Past Medical History:   Diagnosis Date    Anemia     Bleeding     Bleeding issues post 1  colonoscopy and during 1 prep- caused to pass out    Broken bones     CHF (congestive heart failure)     COVID-19     3/2021    Hemorrhoids     Hyperlipidemia     Hypertension     Pneumonia     Pre-diabetes     Shortness of breath      Past Surgical History:   Procedure Laterality Date    APPENDECTOMY      BONE MARROW BIOPSY      COLONOSCOPY      2012?- normal per patient    COLONOSCOPY N/A 8/10/2022    Procedure: COLONOSCOPY WITH ORISE INJECTION/POLYPECTOMY /SNARE/CLIP APPLICATION X9;  Surgeon: Steven Holm MD;  Location: [facility] ENDOSCOPY;  Service: Gastroenterology;  Laterality: N/A;  COLON POLYPS  DIVERTICULOSIS    CORONARY ARTERY BYPASS GRAFT      triple    ENDOSCOPY N/A 8/10/2022    Procedure:  ESOPHAGOGASTRODUODENOSCOPY WITH BIOPSIES;  Surgeon: Steven Holm MD;  Location: [facility] ENDOSCOPY;  Service: Gastroenterology;  Laterality: N/A;  HIATAL HERNIA  ERIK ULCER    HEMORRHOIDECTOMY      UPPER GASTROINTESTINAL ENDOSCOPY        Social History     Tobacco Use    Smoking status: Never     Passive exposure: Yes    Smokeless tobacco: Never   Vaping Use    Vaping Use: Never used   Substance Use Topics    Alcohol use: Never    Drug use: Never     Family History   Problem Relation Age of Onset    Colon cancer Brother 55    Prostate cancer Brother     Diabetes Other     Malig Hyperthermia Neg Hx        Medications:  Prior to Admission medications    Medication Sig Start Date End Date Taking? Authorizing Provider   albuterol sulfate  (90 Base) MCG/ACT inhaler Inhale 2 puffs Every 6 (Six) Hours As Needed. 5/5/21  Yes ProviderJewels MD   aspirin 81 MG chewable tablet aspirin 81 mg oral tablet,chewable chew 1 tablet (81 mg) by oral route once daily   Active 4/4/21  Yes ProviderJewels MD   atorvastatin (LIPITOR) 40 MG tablet atorvastatin 40 mg oral tablet take 1 tablet (40 mg) by oral route once daily at bedtime   Active 3/2/21  Yes ProviderJewels MD   Ipratropium-Albuterol (ALBUTEROL-IPRATROPIUM IN) Duo Neb 3 ml inhalation Q 4 hours as needed   Active   Yes ProviderJewels MD   lisinopril (PRINIVIL,ZESTRIL) 10 MG tablet lisinopril 10 mg oral tablet take 1 tablet (10 mg) by oral route once daily   Active 2/5/21  Yes ProviderJewels MD   metFORMIN ER (GLUCOPHAGE-XR) 500 MG 24 hr tablet Take 1 tablet by mouth Daily With Breakfast. 10/25/23  Yes Radha Klein APRN   metoprolol succinate XL (TOPROL-XL) 25 MG 24 hr tablet metoprolol succinate 25 mg oral tablet extended release 24 hr take 1 tablet (25 mg) by oral route once daily for 30 days   Suspended 4/4/21  Yes ProviderJewels MD   metoprolol succinate XL (TOPROL-XL) 50 MG 24 hr tablet Take 1.5 tablets by  "mouth Daily. 7/27/22  Yes Provider, MD Jewels   omeprazole (priLOSEC) 40 MG capsule Take 1 capsule by mouth Daily. 10/25/23  Yes Radha Klein APRN   warfarin (COUMADIN) 2 MG tablet Take 1 tablet by mouth Every Night. 3/9/21  Yes Provider, MD Jewels        Allergies:   Patient has no known allergies.    Health Maintenance Due   Topic Date Due    TDAP/TD VACCINES (1 - Tdap) Never done    ZOSTER VACCINE (1 of 2) Never done    DIABETIC EYE EXAM  06/16/2022    URINE MICROALBUMIN  07/12/2023         Vital Signs:   /54   Pulse 68   Temp 97.5 °F (36.4 °C)   Ht 175.3 cm (69\")   Wt 71.4 kg (157 lb 6.4 oz)   SpO2 100%   BMI 23.24 kg/m²     Wt Readings from Last 3 Encounters:   01/31/24 71.4 kg (157 lb 6.4 oz)   11/30/23 70.3 kg (155 lb)   11/13/23 72.7 kg (160 lb 4.4 oz)     BP Readings from Last 3 Encounters:   01/31/24 126/54   11/30/23 116/60   11/13/23 163/81       BMI is within normal parameters. No other follow-up for BMI required.       Physical Exam  Vitals reviewed.   Constitutional:       Appearance: Normal appearance. He is well-developed.   HENT:      Head: Normocephalic and atraumatic.   Eyes:      Conjunctiva/sclera: Conjunctivae normal.      Pupils: Pupils are equal, round, and reactive to light.   Cardiovascular:      Rate and Rhythm: Normal rate and regular rhythm.      Heart sounds: Murmur heard.      No friction rub. No gallop.   Pulmonary:      Effort: Pulmonary effort is normal.      Breath sounds: Normal breath sounds. Examination of the right-upper field reveals decreased breath sounds. No wheezing or rhonchi.   Abdominal:      General: Bowel sounds are normal. There is no distension.      Palpations: Abdomen is soft.      Tenderness: There is no abdominal tenderness.   Skin:     General: Skin is warm and dry.   Neurological:      Mental Status: He is alert and oriented to person, place, and time.      Cranial Nerves: No cranial nerve deficit.   Psychiatric:         Mood " and Affect: Mood and affect normal.         Behavior: Behavior normal.         Thought Content: Thought content normal.         Judgment: Judgment normal.       Result Review :    The following data was reviewed by OLIVIA Nazario on 01/31/24 at 14:56 EST:        XR Forearm 2 View Right    Result Date: 11/13/2023   Generalized soft tissue swelling.  No acute osseous abnormalities are identified.      GREGORIO DUKES MD       Electronically Signed and Approved By: GREGORIO DUKES MD on 11/13/2023 at 16:55               Laboratory Studies  Labs were reviewed with the patient.             Assessment and Plan    Diagnoses and all orders for this visit:    1. Primary hypertension (Primary)    2. Mixed hyperlipidemia    3. Atrial fibrillation, unspecified type    4. Pulmonary emphysema, unspecified emphysema type    5. Monoclonal gammopathy    6. Iron deficiency anemia, unspecified iron deficiency anemia type    7. Type 2 diabetes mellitus with stage 3a chronic kidney disease, without long-term current use of insulin  -     Microalbumin / Creatinine Urine Ratio - Urine, Clean Catch       1. Gammopathy and iron deficiency.  He was advised to keep his appointment with Dr. Shen.    2.  Emphysema.  Oxygenation is 100% on room air today.  Patient reports no significant dyspnea or wheezing.  Patient to continue use of oxygen as needed and will monitor pulse oximetry at home to determine need for oxygen.  Patient to continue inhalers as needed.  If he feels that his breathing is worse he understands to follow-up with both PCP and pulmonology.     3. Dental issues.  He was recommended to see a dentist.    4. Diabetes.  Kidney function has been declined in the past. I will obtain a urine microalbumin today.    5.  A-fib  Continue anticoagulant and follow-up with cardiology as scheduled.          Follow Up   Return in about 6 months (around 7/31/2024) for Next scheduled follow up.  Patient was given instructions and  counseling regarding his condition or for health maintenance advice. Please see specific information pulled into the AVS if appropriate.     Please note that portions of this note were completed with a voice recognition program.    Transcribed from ambient dictation for OLIVIA Nazario by Kristin Castorena.  01/31/24   12:12 EST    Patient or patient representative verbalized consent to the visit recording.  I have personally performed the services described in this document as transcribed by the above individual, and it is both accurate and complete.

## 2024-02-01 DIAGNOSIS — N18.31 STAGE 3A CHRONIC KIDNEY DISEASE: Primary | ICD-10-CM

## 2024-02-01 DIAGNOSIS — R80.9 ALBUMINURIA: ICD-10-CM

## 2024-02-01 DIAGNOSIS — E11.65 TYPE 2 DIABETES MELLITUS WITH HYPERGLYCEMIA, WITHOUT LONG-TERM CURRENT USE OF INSULIN: ICD-10-CM

## 2024-06-06 DIAGNOSIS — K21.9 GASTROESOPHAGEAL REFLUX DISEASE WITHOUT ESOPHAGITIS: ICD-10-CM

## 2024-06-06 RX ORDER — OMEPRAZOLE 40 MG/1
40 CAPSULE, DELAYED RELEASE ORAL DAILY
Qty: 90 CAPSULE | Refills: 1 | Status: SHIPPED | OUTPATIENT
Start: 2024-06-06

## 2024-06-25 DIAGNOSIS — E11.65 TYPE 2 DIABETES MELLITUS WITH HYPERGLYCEMIA, WITHOUT LONG-TERM CURRENT USE OF INSULIN: ICD-10-CM

## 2024-06-25 RX ORDER — METFORMIN HYDROCHLORIDE 500 MG/1
500 TABLET, EXTENDED RELEASE ORAL
Qty: 90 TABLET | Refills: 1 | Status: SHIPPED | OUTPATIENT
Start: 2024-06-25

## 2024-07-31 ENCOUNTER — OFFICE VISIT (OUTPATIENT)
Dept: FAMILY MEDICINE CLINIC | Facility: CLINIC | Age: 89
End: 2024-07-31
Payer: MEDICARE

## 2024-07-31 VITALS
OXYGEN SATURATION: 98 % | TEMPERATURE: 97.6 F | BODY MASS INDEX: 23.15 KG/M2 | WEIGHT: 156.3 LBS | SYSTOLIC BLOOD PRESSURE: 148 MMHG | DIASTOLIC BLOOD PRESSURE: 74 MMHG | HEART RATE: 60 BPM | HEIGHT: 69 IN

## 2024-07-31 DIAGNOSIS — I10 PRIMARY HYPERTENSION: ICD-10-CM

## 2024-07-31 DIAGNOSIS — I48.91 ATRIAL FIBRILLATION, UNSPECIFIED TYPE: ICD-10-CM

## 2024-07-31 DIAGNOSIS — E11.65 TYPE 2 DIABETES MELLITUS WITH HYPERGLYCEMIA, WITHOUT LONG-TERM CURRENT USE OF INSULIN: Primary | ICD-10-CM

## 2024-07-31 DIAGNOSIS — R10.31 RIGHT LOWER QUADRANT ABDOMINAL PAIN: ICD-10-CM

## 2024-07-31 DIAGNOSIS — E78.2 MIXED HYPERLIPIDEMIA: ICD-10-CM

## 2024-07-31 DIAGNOSIS — R10.9 RIGHT FLANK PAIN: ICD-10-CM

## 2024-07-31 DIAGNOSIS — N18.31 TYPE 2 DIABETES MELLITUS WITH STAGE 3A CHRONIC KIDNEY DISEASE, WITHOUT LONG-TERM CURRENT USE OF INSULIN: ICD-10-CM

## 2024-07-31 DIAGNOSIS — J43.9 PULMONARY EMPHYSEMA, UNSPECIFIED EMPHYSEMA TYPE: ICD-10-CM

## 2024-07-31 DIAGNOSIS — R31.9 HEMATURIA, UNSPECIFIED TYPE: ICD-10-CM

## 2024-07-31 DIAGNOSIS — E11.22 TYPE 2 DIABETES MELLITUS WITH STAGE 3A CHRONIC KIDNEY DISEASE, WITHOUT LONG-TERM CURRENT USE OF INSULIN: ICD-10-CM

## 2024-07-31 DIAGNOSIS — L98.9 FACIAL SKIN LESION: ICD-10-CM

## 2024-07-31 DIAGNOSIS — N18.31 STAGE 3A CHRONIC KIDNEY DISEASE: ICD-10-CM

## 2024-07-31 DIAGNOSIS — R80.9 ALBUMINURIA: ICD-10-CM

## 2024-07-31 LAB
ALBUMIN SERPL-MCNC: 4.1 G/DL (ref 3.5–5.2)
ALBUMIN/GLOB SERPL: 1.1 G/DL
ALP SERPL-CCNC: 63 U/L (ref 39–117)
ALT SERPL W P-5'-P-CCNC: 10 U/L (ref 1–41)
ANION GAP SERPL CALCULATED.3IONS-SCNC: 10 MMOL/L (ref 5–15)
AST SERPL-CCNC: 18 U/L (ref 1–40)
BASOPHILS # BLD AUTO: 0.02 10*3/MM3 (ref 0–0.2)
BASOPHILS NFR BLD AUTO: 0.4 % (ref 0–1.5)
BILIRUB BLD-MCNC: NEGATIVE MG/DL
BILIRUB SERPL-MCNC: 0.6 MG/DL (ref 0–1.2)
BUN SERPL-MCNC: 23 MG/DL (ref 8–23)
BUN/CREAT SERPL: 15.5 (ref 7–25)
CALCIUM SPEC-SCNC: 10.1 MG/DL (ref 8.6–10.5)
CHLORIDE SERPL-SCNC: 105 MMOL/L (ref 98–107)
CLARITY, POC: CLEAR
CO2 SERPL-SCNC: 26 MMOL/L (ref 22–29)
COLOR UR: ABNORMAL
CREAT SERPL-MCNC: 1.48 MG/DL (ref 0.76–1.27)
DEPRECATED RDW RBC AUTO: 46.6 FL (ref 37–54)
EGFRCR SERPLBLD CKD-EPI 2021: 45.2 ML/MIN/1.73
EOSINOPHIL # BLD AUTO: 0.38 10*3/MM3 (ref 0–0.4)
EOSINOPHIL NFR BLD AUTO: 6.7 % (ref 0.3–6.2)
ERYTHROCYTE [DISTWIDTH] IN BLOOD BY AUTOMATED COUNT: 13 % (ref 12.3–15.4)
GLOBULIN UR ELPH-MCNC: 3.8 GM/DL
GLUCOSE SERPL-MCNC: 73 MG/DL (ref 65–99)
GLUCOSE UR STRIP-MCNC: NEGATIVE MG/DL
HBA1C MFR BLD: 6.2 % (ref 4.8–5.6)
HCT VFR BLD AUTO: 35.1 % (ref 37.5–51)
HGB BLD-MCNC: 11.4 G/DL (ref 13–17.7)
IMM GRANULOCYTES # BLD AUTO: 0.01 10*3/MM3 (ref 0–0.05)
IMM GRANULOCYTES NFR BLD AUTO: 0.2 % (ref 0–0.5)
KETONES UR QL: NEGATIVE
LEUKOCYTE EST, POC: NEGATIVE
LYMPHOCYTES # BLD AUTO: 1.67 10*3/MM3 (ref 0.7–3.1)
LYMPHOCYTES NFR BLD AUTO: 29.7 % (ref 19.6–45.3)
MCH RBC QN AUTO: 31.8 PG (ref 26.6–33)
MCHC RBC AUTO-ENTMCNC: 32.5 G/DL (ref 31.5–35.7)
MCV RBC AUTO: 98 FL (ref 79–97)
MONOCYTES # BLD AUTO: 0.61 10*3/MM3 (ref 0.1–0.9)
MONOCYTES NFR BLD AUTO: 10.8 % (ref 5–12)
NEUTROPHILS NFR BLD AUTO: 2.94 10*3/MM3 (ref 1.7–7)
NEUTROPHILS NFR BLD AUTO: 52.2 % (ref 42.7–76)
NITRITE UR-MCNC: NEGATIVE MG/ML
NRBC BLD AUTO-RTO: 0 /100 WBC (ref 0–0.2)
PH UR: 6.5 [PH] (ref 5–8)
PLATELET # BLD AUTO: 154 10*3/MM3 (ref 140–450)
PMV BLD AUTO: 10.6 FL (ref 6–12)
POTASSIUM SERPL-SCNC: 4.1 MMOL/L (ref 3.5–5.2)
PROT SERPL-MCNC: 7.9 G/DL (ref 6–8.5)
PROT UR STRIP-MCNC: ABNORMAL MG/DL
RBC # BLD AUTO: 3.58 10*6/MM3 (ref 4.14–5.8)
RBC # UR STRIP: ABNORMAL /UL
SODIUM SERPL-SCNC: 141 MMOL/L (ref 136–145)
SP GR UR: 1.03 (ref 1–1.03)
UROBILINOGEN UR QL: NORMAL
WBC NRBC COR # BLD AUTO: 5.63 10*3/MM3 (ref 3.4–10.8)

## 2024-07-31 PROCEDURE — 1160F RVW MEDS BY RX/DR IN RCRD: CPT

## 2024-07-31 PROCEDURE — 99214 OFFICE O/P EST MOD 30 MIN: CPT

## 2024-07-31 PROCEDURE — 83036 HEMOGLOBIN GLYCOSYLATED A1C: CPT

## 2024-07-31 PROCEDURE — 1126F AMNT PAIN NOTED NONE PRSNT: CPT

## 2024-07-31 PROCEDURE — G2211 COMPLEX E/M VISIT ADD ON: HCPCS

## 2024-07-31 PROCEDURE — 36415 COLL VENOUS BLD VENIPUNCTURE: CPT

## 2024-07-31 PROCEDURE — 85025 COMPLETE CBC W/AUTO DIFF WBC: CPT

## 2024-07-31 PROCEDURE — 81002 URINALYSIS NONAUTO W/O SCOPE: CPT

## 2024-07-31 PROCEDURE — 80053 COMPREHEN METABOLIC PANEL: CPT

## 2024-07-31 PROCEDURE — 1159F MED LIST DOCD IN RCRD: CPT

## 2024-07-31 NOTE — PROGRESS NOTES
Chief Complaint  Chief Complaint   Patient presents with    Diabetes    Hypertension    Abdominal Pain     Pt has pain from umbilicus and around the right side to his back       Subjective      Arvind Coates presents to [facility]  History of Present Illness  The patient presents for evaluation of multiple medical concerns.    The patient's last consultation was in 01/2023, during which he consulted a cardiologist. His pacemaker was evaluated, yielding normal results. His Coumadin dosage has been adjusted to 2 mg once nightly, with the dosage adjusted as necessary. He is also on a regimen of metoprolol 50 mg and 25 mg daily, and lisinopril 10 mg, which he took late last night. He does not monitor his blood pressure at home.    The patient reports experiencing abdominal pain, which radiates to his back. He denies any history of nephrolithiasis. He suspects a possible case of shingles, having had shingles twice in the past. His gallbladder is intact. He denies any episodes of diarrhea or vomiting. He experienced mild constipation yesterday, which was alleviated by taking a stool softener.    The patient's respiratory function remains stable. He occasionally uses his oxygen machine, but not consistently. He has not consulted a pulmonologist.    The patient reports the presence of a facial lesion. He has not consulted a dermatologist.      Objective     Medical History:  Past Medical History:   Diagnosis Date    Anemia     Bleeding     Bleeding issues post 1  colonoscopy and during 1 prep- caused to pass out    Broken bones     CHF (congestive heart failure)     COVID-19     3/2021    Hemorrhoids     Hyperlipidemia     Hypertension     Pneumonia     Pre-diabetes     Shortness of breath      Past Surgical History:   Procedure Laterality Date    APPENDECTOMY      BONE MARROW BIOPSY      COLONOSCOPY      2012?- normal per patient    COLONOSCOPY N/A 8/10/2022    Procedure: COLONOSCOPY WITH  ORISE INJECTION/POLYPECTOMY /SNARE/CLIP APPLICATION X9;  Surgeon: Steven Holm MD;  Location: [facility] ENDOSCOPY;  Service: Gastroenterology;  Laterality: N/A;  COLON POLYPS  DIVERTICULOSIS    CORONARY ARTERY BYPASS GRAFT      triple    ENDOSCOPY N/A 8/10/2022    Procedure: ESOPHAGOGASTRODUODENOSCOPY WITH BIOPSIES;  Surgeon: Steven Holm MD;  Location: [facility] ENDOSCOPY;  Service: Gastroenterology;  Laterality: N/A;  HIATAL HERNIA  ERIK ULCER    HEMORRHOIDECTOMY      UPPER GASTROINTESTINAL ENDOSCOPY        Social History     Tobacco Use    Smoking status: Never     Passive exposure: Yes    Smokeless tobacco: Never   Vaping Use    Vaping status: Never Used   Substance Use Topics    Alcohol use: Never    Drug use: Never     Family History   Problem Relation Age of Onset    Colon cancer Brother 55    Prostate cancer Brother     Diabetes Other     Malig Hyperthermia Neg Hx        Medications:  Prior to Admission medications    Medication Sig Start Date End Date Taking? Authorizing Provider   albuterol sulfate  (90 Base) MCG/ACT inhaler Inhale 2 puffs Every 6 (Six) Hours As Needed. 5/5/21  Yes ProviderJewels MD   aspirin 81 MG chewable tablet aspirin 81 mg oral tablet,chewable chew 1 tablet (81 mg) by oral route once daily   Active 4/4/21  Yes ProviderJewels MD   atorvastatin (LIPITOR) 40 MG tablet atorvastatin 40 mg oral tablet take 1 tablet (40 mg) by oral route once daily at bedtime   Active 3/2/21  Yes ProviderJewels MD   Ipratropium-Albuterol (ALBUTEROL-IPRATROPIUM IN) Duo Neb 3 ml inhalation Q 4 hours as needed   Active   Yes ProviderJewels MD   lisinopril (PRINIVIL,ZESTRIL) 10 MG tablet lisinopril 10 mg oral tablet take 1 tablet (10 mg) by oral route once daily   Active 2/5/21  Yes ProviderJewels MD   metFORMIN ER (GLUCOPHAGE-XR) 500 MG 24 hr tablet Take 1 tablet by mouth Daily With Breakfast. 6/25/24  Yes Radha Klein APRN   metoprolol  "succinate XL (TOPROL-XL) 25 MG 24 hr tablet metoprolol succinate 25 mg oral tablet extended release 24 hr take 1 tablet (25 mg) by oral route once daily for 30 days   Suspended 4/4/21  Yes ProviderJewels MD   metoprolol succinate XL (TOPROL-XL) 50 MG 24 hr tablet Take 1 tablet by mouth Daily. 7/27/22  Yes ProviderJewels MD   omeprazole (priLOSEC) 40 MG capsule Take 1 capsule by mouth Daily. 6/6/24  Yes Radha Klein APRN   warfarin (COUMADIN) 2 MG tablet Take 1 tablet by mouth Every Night. 3/9/21  Yes Provider, MD Jewels        Allergies:   Patient has no known allergies.    Health Maintenance Due   Topic Date Due    TDAP/TD VACCINES (1 - Tdap) Never done    ZOSTER VACCINE (1 of 2) Never done    RSV Vaccine - Adults (1 - 1-dose 60+ series) Never done    DIABETIC EYE EXAM  06/16/2022    COVID-19 Vaccine (5 - 2023-24 season) 09/01/2023    HEMOGLOBIN A1C  04/25/2024    LIPID PANEL  07/11/2024    ANNUAL WELLNESS VISIT  10/25/2024         Vital Signs:   /74 (BP Location: Left arm, Patient Position: Sitting, Cuff Size: Adult)   Pulse 60   Temp 97.6 °F (36.4 °C) (Oral)   Ht 175.3 cm (69\")   Wt 70.9 kg (156 lb 4.8 oz)   SpO2 98%   BMI 23.08 kg/m²     Wt Readings from Last 3 Encounters:   07/31/24 70.9 kg (156 lb 4.8 oz)   01/31/24 71.4 kg (157 lb 6.4 oz)   11/30/23 70.3 kg (155 lb)     BP Readings from Last 3 Encounters:   07/31/24 148/74   01/31/24 126/54   11/30/23 116/60       BMI is within normal parameters. No other follow-up for BMI required.       Physical Exam  Vitals reviewed.   Constitutional:       Appearance: Normal appearance. He is well-developed.   HENT:      Head: Normocephalic and atraumatic.   Eyes:      Conjunctiva/sclera: Conjunctivae normal.      Pupils: Pupils are equal, round, and reactive to light.   Cardiovascular:      Rate and Rhythm: Normal rate and regular rhythm.      Heart sounds: No murmur heard.     No friction rub. No gallop.   Pulmonary:      " Effort: Pulmonary effort is normal.      Breath sounds: Normal breath sounds. No wheezing or rhonchi.   Abdominal:      General: Bowel sounds are normal. There is no distension.      Palpations: Abdomen is soft.      Tenderness: There is no abdominal tenderness in the right upper quadrant. There is right CVA tenderness. There is no guarding.   Skin:     General: Skin is warm and dry.   Neurological:      Mental Status: He is alert and oriented to person, place, and time.      Cranial Nerves: No cranial nerve deficit.   Psychiatric:         Mood and Affect: Mood and affect normal.         Behavior: Behavior normal.         Thought Content: Thought content normal.         Judgment: Judgment normal.     Physical Exam  Vital Signs  Vitals show a blood pressure of 148/74.      Result Review :    The following data was reviewed by OLIVIA Naazrio on 07/31/24 at 14:17 EDT:    Common labs          10/25/2023    11:35 1/31/2024    11:50 7/31/2024    14:32   Common Labs   Glucose 95   73    BUN 24   23    Creatinine 1.40   1.48    Sodium 143   141    Potassium 4.2   4.1    Chloride 106   105    Calcium 9.8   10.1    Albumin 4.2   4.1    Total Bilirubin 0.6   0.6    Alkaline Phosphatase 76   63    AST (SGOT) 20   18    ALT (SGPT) 15   10    WBC 6.67   5.63    Hemoglobin 12.4   11.4    Hematocrit 35.9   35.1    Platelets 173   154    Hemoglobin A1C 6.50   6.20    Microalbumin, Urine  9.7     PSA 9.250            No Images in the past 120 days found..    Results                 Assessment and Plan    Diagnoses and all orders for this visit:    1. Type 2 diabetes mellitus with hyperglycemia, without long-term current use of insulin (Primary)    2. Primary hypertension    3. Mixed hyperlipidemia    4. Atrial fibrillation, unspecified type    5. Pulmonary emphysema, unspecified emphysema type    6. Type 2 diabetes mellitus with stage 3a chronic kidney disease, without long-term current use of insulin    7. Right flank  pain  -     POCT urinalysis dipstick, manual    8. Facial skin lesion  -     Ambulatory Referral to Dermatology    9. Right lower quadrant abdominal pain       Assessment & Plan  1. Abdominal pain.  Urinalysis in office reveals hematuria. A CT scan of the abdomen will be ordered.    2.  Pulmonary emphysema/oxygen therapy.  An overnight oximetry study will be ordered for the patient.  Continue use of supplemental oxygen via nasal cannula as needed.  Continue albuterol inhaler and nebulizer as needed.    3. Facial lesion.  The patient will be referred to a dermatologist.  Lesion has been present on face for quite some time.  He has tried topical corticosteroids with improvement but no resolution.    4.  Primary hypertension/hyperlipidemia/A-fib.  Continue current medication regimen.  Follow-up with cardiology as scheduled.    5.  Type 2 diabetes.  Kidney function had previously shown a slight decline.  Renal panel to be reevaluated.  At this time, continue metformin as prescribed.          Smoking Cessation:    Arvind DUMONT Jaxon  reports that he has never smoked. He has been exposed to tobacco smoke. He has never used smokeless tobacco.             Follow Up   Return in about 6 months (around 1/31/2025) for Next scheduled follow up.  Patient was given instructions and counseling regarding his condition or for health maintenance advice. Please see specific information pulled into the AVS if appropriate.     Please note that portions of this note were completed with a voice recognition program.    Patient or patient representative verbalized consent for the use of Ambient Listening during the visit with  OLIVIA Nazario for chart documentation. 8/1/2024  09:46 EDT

## 2024-08-01 ENCOUNTER — PATIENT MESSAGE (OUTPATIENT)
Dept: FAMILY MEDICINE CLINIC | Facility: CLINIC | Age: 89
End: 2024-08-01
Payer: MEDICARE

## 2024-08-02 ENCOUNTER — HOSPITAL ENCOUNTER (OUTPATIENT)
Dept: CT IMAGING | Facility: HOSPITAL | Age: 89
Discharge: HOME | End: 2024-08-02
Payer: MEDICARE

## 2024-08-02 DIAGNOSIS — E11.65 TYPE 2 DIABETES MELLITUS WITH HYPERGLYCEMIA, WITHOUT LONG-TERM CURRENT USE OF INSULIN: Primary | ICD-10-CM

## 2024-08-02 DIAGNOSIS — R31.9 HEMATURIA, UNSPECIFIED TYPE: ICD-10-CM

## 2024-08-02 DIAGNOSIS — R10.9 RIGHT FLANK PAIN: ICD-10-CM

## 2024-08-02 PROCEDURE — 74176 CT ABD & PELVIS W/O CONTRAST: CPT

## 2024-08-07 ENCOUNTER — TELEPHONE (OUTPATIENT)
Dept: FAMILY MEDICINE CLINIC | Facility: CLINIC | Age: 89
End: 2024-08-07
Payer: MEDICARE

## 2024-08-07 DIAGNOSIS — S22.080A COMPRESSION FRACTURE OF T12 VERTEBRA, INITIAL ENCOUNTER: Primary | ICD-10-CM

## 2024-08-07 NOTE — TELEPHONE ENCOUNTER
Caller: ORLANDO VALVERDE    Relationship: Emergency Contact    Best call back number: [phone]    Caller requesting test results: DAUGHTER     What test was performed: CT SCAN     When was the test performed: 08/02/2024    Where was the test performed: [facility]     Additional notes: RESULTS HAVE BEEN SEEN IN MY CHART AND SHE WOULD LIKE SOMEONE TO GO OVER THEM WITH HER

## 2024-08-14 ENCOUNTER — OFFICE VISIT (OUTPATIENT)
Dept: NEUROSURGERY | Facility: CLINIC | Age: 89
End: 2024-08-14
Payer: MEDICARE

## 2024-08-14 VITALS
DIASTOLIC BLOOD PRESSURE: 81 MMHG | SYSTOLIC BLOOD PRESSURE: 156 MMHG | WEIGHT: 150.3 LBS | HEIGHT: 69 IN | BODY MASS INDEX: 22.26 KG/M2

## 2024-08-14 DIAGNOSIS — S22.080A COMPRESSION FRACTURE OF T12 VERTEBRA, INITIAL ENCOUNTER: Primary | ICD-10-CM

## 2024-08-14 NOTE — PROGRESS NOTES
"Chief Complaint  Back Pain    Subjective          Arvind Coates who is a 88 y.o. year old male who presents to [facility] for Evaluation of the Spine.     The patient complains of pain located in the thoracic  spine/right posterior flank.  Patients states the pain has been present for 1 week.  The pain came on acutely.  The pain scale level is 5.  The pain  radiates to the right lower quadrant .  The pain is constant and described as aching and burning.  The pain is worse at nighttime. Patient states  trying to sleep makes the pain worse.  Patient states  nothing  makes the pain better.    Associated Symptoms Include: Denies numbness and tingling  Conservative Interventions Include:  No treatment as of yet    Was this the result of an injury or accident?: Yes, struck his lower back on a door handle    History of Previous Spinal Surgery?: No    This patient  reports that he has never smoked. He has been exposed to tobacco smoke. He has never used smokeless tobacco.    Review of Systems   Musculoskeletal:  Positive for back pain.        Objective   Vital Signs:   /81 (BP Location: Left arm, Patient Position: Sitting)   Ht 175.3 cm (69\")   Wt 68.2 kg (150 lb 4.8 oz)   BMI 22.20 kg/m²       Physical Exam  Constitutional:       Appearance: He is normal weight.   Musculoskeletal:         General: Tenderness (right lower thoracic region) present.   Skin:     Comments: Bruising just off midline to the right in lower thoracic region   Neurological:      Mental Status: He is alert.      Sensory: No sensory deficit.      Motor: No weakness.   Psychiatric:         Mood and Affect: Mood normal.          Result Review :   I have personally interpreted the CT images which show a T12 inferior endplate fracture.        Assessment and Plan    Diagnoses and all orders for this visit:    1. Compression fracture of T12 vertebra, initial encounter (Primary)    He could consider pain " medication or bracing. He would like to get a brace.    He will f/u in one month with repeat imaging.     Follow Up   Return in about 1 month (around 9/14/2024).  Patient was given instructions and counseling regarding his condition or for health maintenance advice. Please see specific information pulled into the AVS if appropriate.

## 2024-08-15 ENCOUNTER — TELEPHONE (OUTPATIENT)
Dept: FAMILY MEDICINE CLINIC | Facility: CLINIC | Age: 89
End: 2024-08-15
Payer: MEDICARE

## 2024-08-15 NOTE — TELEPHONE ENCOUNTER
Spoke with patient's daughter per DEB regarding patient's back brace ordered from [employer]. Our office ordered one on 08/07/2024 and neurosurgery ordered one on 08/14/2024. I wanted to ask if he ever received one yet as she had asked me questions about [employer] saying they needing insurance card information and notes which our office did send with the order but Dr. Love's office did not as it looks. Left voicemail for her to call back. I also left a voicemail with the [employer] rep Andriy Jaeger to call me back to see if he has any information about this.

## 2024-08-19 ENCOUNTER — PATIENT ROUNDING (BHMG ONLY) (OUTPATIENT)
Dept: NEUROSURGERY | Facility: CLINIC | Age: 89
End: 2024-08-19
Payer: MEDICARE

## 2024-09-09 DIAGNOSIS — K21.9 GASTROESOPHAGEAL REFLUX DISEASE WITHOUT ESOPHAGITIS: ICD-10-CM

## 2024-09-09 RX ORDER — OMEPRAZOLE 40 MG/1
40 CAPSULE, DELAYED RELEASE ORAL DAILY
Qty: 90 CAPSULE | Refills: 1 | Status: SHIPPED | OUTPATIENT
Start: 2024-09-09

## 2024-09-17 ENCOUNTER — HOSPITAL ENCOUNTER (OUTPATIENT)
Dept: GENERAL RADIOLOGY | Facility: HOSPITAL | Age: 89
Discharge: HOME | End: 2024-09-17
Admitting: NEUROLOGICAL SURGERY
Payer: MEDICARE

## 2024-09-17 ENCOUNTER — OFFICE VISIT (OUTPATIENT)
Dept: NEUROSURGERY | Facility: CLINIC | Age: 89
End: 2024-09-17
Payer: MEDICARE

## 2024-09-17 VITALS
DIASTOLIC BLOOD PRESSURE: 69 MMHG | WEIGHT: 152.2 LBS | BODY MASS INDEX: 22.54 KG/M2 | HEIGHT: 69 IN | SYSTOLIC BLOOD PRESSURE: 138 MMHG

## 2024-09-17 DIAGNOSIS — S22.080D COMPRESSION FRACTURE OF T12 VERTEBRA WITH ROUTINE HEALING, SUBSEQUENT ENCOUNTER: Primary | ICD-10-CM

## 2024-09-17 DIAGNOSIS — S22.080A COMPRESSION FRACTURE OF T12 VERTEBRA, INITIAL ENCOUNTER: ICD-10-CM

## 2024-09-17 PROCEDURE — 1159F MED LIST DOCD IN RCRD: CPT | Performed by: NEUROLOGICAL SURGERY

## 2024-09-17 PROCEDURE — 99213 OFFICE O/P EST LOW 20 MIN: CPT | Performed by: NEUROLOGICAL SURGERY

## 2024-09-17 PROCEDURE — 1160F RVW MEDS BY RX/DR IN RCRD: CPT | Performed by: NEUROLOGICAL SURGERY

## 2024-09-17 PROCEDURE — 72100 X-RAY EXAM L-S SPINE 2/3 VWS: CPT

## 2025-01-17 RX ORDER — PIOGLITAZONE 15 MG/1
15 TABLET ORAL DAILY
Qty: 90 TABLET | Refills: 1 | Status: SHIPPED | OUTPATIENT
Start: 2025-01-17

## 2025-01-31 ENCOUNTER — OFFICE VISIT (OUTPATIENT)
Dept: FAMILY MEDICINE CLINIC | Facility: CLINIC | Age: OVER 89
End: 2025-01-31
Payer: MEDICARE

## 2025-01-31 VITALS
SYSTOLIC BLOOD PRESSURE: 134 MMHG | BODY MASS INDEX: 22.41 KG/M2 | OXYGEN SATURATION: 98 % | TEMPERATURE: 97.8 F | WEIGHT: 151.3 LBS | HEIGHT: 69 IN | DIASTOLIC BLOOD PRESSURE: 62 MMHG | HEART RATE: 61 BPM

## 2025-01-31 DIAGNOSIS — C44.319 BASAL CELL CARCINOMA (BCC) OF SKIN OF OTHER PART OF FACE: ICD-10-CM

## 2025-01-31 DIAGNOSIS — E11.22 TYPE 2 DIABETES MELLITUS WITH STAGE 3A CHRONIC KIDNEY DISEASE, WITHOUT LONG-TERM CURRENT USE OF INSULIN: ICD-10-CM

## 2025-01-31 DIAGNOSIS — N18.31 STAGE 3A CHRONIC KIDNEY DISEASE: ICD-10-CM

## 2025-01-31 DIAGNOSIS — J43.9 PULMONARY EMPHYSEMA, UNSPECIFIED EMPHYSEMA TYPE: ICD-10-CM

## 2025-01-31 DIAGNOSIS — I10 PRIMARY HYPERTENSION: ICD-10-CM

## 2025-01-31 DIAGNOSIS — Z23 NEED FOR INFLUENZA VACCINATION: ICD-10-CM

## 2025-01-31 DIAGNOSIS — E78.2 MIXED HYPERLIPIDEMIA: ICD-10-CM

## 2025-01-31 DIAGNOSIS — Z00.00 MEDICARE ANNUAL WELLNESS VISIT, SUBSEQUENT: Primary | ICD-10-CM

## 2025-01-31 DIAGNOSIS — N18.31 TYPE 2 DIABETES MELLITUS WITH STAGE 3A CHRONIC KIDNEY DISEASE, WITHOUT LONG-TERM CURRENT USE OF INSULIN: ICD-10-CM

## 2025-01-31 DIAGNOSIS — I48.91 ATRIAL FIBRILLATION, UNSPECIFIED TYPE: ICD-10-CM

## 2025-01-31 DIAGNOSIS — Z95.0 PACEMAKER: ICD-10-CM

## 2025-01-31 LAB
ALBUMIN SERPL-MCNC: 3.5 G/DL (ref 3.5–5.2)
ALBUMIN UR-MCNC: 34.6 MG/DL
ALBUMIN/GLOB SERPL: 0.8 G/DL
ALP SERPL-CCNC: 67 U/L (ref 39–117)
ALT SERPL W P-5'-P-CCNC: 11 U/L (ref 1–41)
ANION GAP SERPL CALCULATED.3IONS-SCNC: 13 MMOL/L (ref 5–15)
AST SERPL-CCNC: 19 U/L (ref 1–40)
BASOPHILS # BLD MANUAL: 0.05 10*3/MM3 (ref 0–0.2)
BASOPHILS NFR BLD MANUAL: 1 % (ref 0–1.5)
BILIRUB SERPL-MCNC: 0.4 MG/DL (ref 0–1.2)
BUN SERPL-MCNC: 25 MG/DL (ref 8–23)
BUN/CREAT SERPL: 13.7 (ref 7–25)
CALCIUM SPEC-SCNC: 10 MG/DL (ref 8.6–10.5)
CHLORIDE SERPL-SCNC: 104 MMOL/L (ref 98–107)
CO2 SERPL-SCNC: 25 MMOL/L (ref 22–29)
CREAT SERPL-MCNC: 1.82 MG/DL (ref 0.76–1.27)
CREAT UR-MCNC: 157.7 MG/DL
DEPRECATED RDW RBC AUTO: 50.7 FL (ref 37–54)
EGFRCR SERPLBLD CKD-EPI 2021: 35.1 ML/MIN/1.73
EOSINOPHIL # BLD MANUAL: 0.14 10*3/MM3 (ref 0–0.4)
EOSINOPHIL NFR BLD MANUAL: 3 % (ref 0.3–6.2)
ERYTHROCYTE [DISTWIDTH] IN BLOOD BY AUTOMATED COUNT: 14.4 % (ref 12.3–15.4)
GLOBULIN UR ELPH-MCNC: 4.5 GM/DL
GLUCOSE SERPL-MCNC: 95 MG/DL (ref 65–99)
HBA1C MFR BLD: 6.3 % (ref 4.8–5.6)
HCT VFR BLD AUTO: 29.5 % (ref 37.5–51)
HGB BLD-MCNC: 9.8 G/DL (ref 13–17.7)
LYMPHOCYTES # BLD MANUAL: 1.15 10*3/MM3 (ref 0.7–3.1)
LYMPHOCYTES NFR BLD MANUAL: 6.1 % (ref 5–12)
MCH RBC QN AUTO: 32.5 PG (ref 26.6–33)
MCHC RBC AUTO-ENTMCNC: 33.2 G/DL (ref 31.5–35.7)
MCV RBC AUTO: 97.7 FL (ref 79–97)
MICROALBUMIN/CREAT UR: 219.4 MG/G (ref 0–29)
MONOCYTES # BLD: 0.28 10*3/MM3 (ref 0.1–0.9)
NEUTROPHILS # BLD AUTO: 2.94 10*3/MM3 (ref 1.7–7)
NEUTROPHILS NFR BLD MANUAL: 64.6 % (ref 42.7–76)
PLAT MORPH BLD: NORMAL
PLATELET # BLD AUTO: 144 10*3/MM3 (ref 140–450)
PMV BLD AUTO: 11.2 FL (ref 6–12)
POTASSIUM SERPL-SCNC: 3.9 MMOL/L (ref 3.5–5.2)
PROT SERPL-MCNC: 8 G/DL (ref 6–8.5)
RBC # BLD AUTO: 3.02 10*6/MM3 (ref 4.14–5.8)
RBC MORPH BLD: NORMAL
SODIUM SERPL-SCNC: 142 MMOL/L (ref 136–145)
VARIANT LYMPHS NFR BLD MANUAL: 25.3 % (ref 19.6–45.3)
WBC MORPH BLD: NORMAL
WBC NRBC COR # BLD AUTO: 4.55 10*3/MM3 (ref 3.4–10.8)

## 2025-01-31 PROCEDURE — 85027 COMPLETE CBC AUTOMATED: CPT

## 2025-01-31 PROCEDURE — 83036 HEMOGLOBIN GLYCOSYLATED A1C: CPT

## 2025-01-31 PROCEDURE — 82570 ASSAY OF URINE CREATININE: CPT

## 2025-01-31 PROCEDURE — 85007 BL SMEAR W/DIFF WBC COUNT: CPT

## 2025-01-31 PROCEDURE — 82043 UR ALBUMIN QUANTITATIVE: CPT

## 2025-01-31 PROCEDURE — 80053 COMPREHEN METABOLIC PANEL: CPT

## 2025-01-31 NOTE — PROGRESS NOTES
Subjective   The ABCs of the Annual Wellness Visit  Medicare Wellness Visit      Arvind Coates is a 89 y.o. patient who presents for a Medicare Wellness Visit.    The following portions of the patient's history were reviewed and   updated as appropriate: allergies, current medications, past family history, past medical history, past social history, past surgical history, and problem list.    Compared to one year ago, the patient's physical   health is the same.  Compared to one year ago, the patient's mental   health is the same.    Recent Hospitalizations:  He was not admitted to the hospital during the last year.     Current Medical Providers:  Patient Care Team:  Radha Klein APRN as PCP - General (Family Medicine)  Molina Shen MD as Consulting Physician (Hematology and Oncology)    Outpatient Medications Prior to Visit   Medication Sig Dispense Refill    albuterol sulfate  (90 Base) MCG/ACT inhaler Inhale 2 puffs Every 6 (Six) Hours As Needed.      aspirin 81 MG chewable tablet aspirin 81 mg oral tablet,chewable chew 1 tablet (81 mg) by oral route once daily   Active      atorvastatin (LIPITOR) 40 MG tablet atorvastatin 40 mg oral tablet take 1 tablet (40 mg) by oral route once daily at bedtime   Active      Ipratropium-Albuterol (ALBUTEROL-IPRATROPIUM IN) Duo Neb 3 ml inhalation Q 4 hours as needed   Active      lisinopril (PRINIVIL,ZESTRIL) 10 MG tablet lisinopril 10 mg oral tablet take 1 tablet (10 mg) by oral route once daily   Active      metoprolol succinate XL (TOPROL-XL) 25 MG 24 hr tablet metoprolol succinate 25 mg oral tablet extended release 24 hr take 1 tablet (25 mg) by oral route once daily for 30 days   Suspended      metoprolol succinate XL (TOPROL-XL) 50 MG 24 hr tablet Take 1 tablet by mouth Daily.      omeprazole (priLOSEC) 40 MG capsule Take 1 capsule by mouth Daily. 90 capsule 1    pioglitazone (Actos) 15 MG tablet Take 1 tablet by mouth Daily. 90 tablet 1     "warfarin (COUMADIN) 2 MG tablet Take 1 tablet by mouth Every Night.      empagliflozin (Jardiance) 10 MG tablet tablet Take 1 tablet by mouth Daily. 90 tablet 1     No facility-administered medications prior to visit.     No opioid medication identified on active medication list. I have reviewed chart for other potential  high risk medication/s and harmful drug interactions in the elderly.      Aspirin is on active medication list. Aspirin use is contraindicated for this patient due to: current use of warfarin.  Patient has been instructed to discontinue this medication. .      Patient Active Problem List   Diagnosis    Atrial fibrillation    Cardiac pacemaker in situ    CHF (congestive heart failure)    Coronary atherosclerosis    Hemorrhoids    History of aortic valve replacement    Hyperlipidemia    Hypertension    Personal history of other drug therapy    Presence of aortocoronary bypass graft    Sick sinus syndrome    Supraventricular tachycardia    Stage 3a chronic kidney disease    Other specified anemias    Malabsorption due to intolerance, not elsewhere classified    Iron deficiency anemia    Weight loss    Positive occult stool blood test    Lower abdominal pain    Monoclonal gammopathy    Type 2 diabetes mellitus without complication, without long-term current use of insulin     Advance Care Planning Advance Directive is not on file.  ACP discussion was held with the patient during this visit. Patient does not have an advance directive, information provided.            Objective   Vitals:    01/31/25 1243   BP: 134/62   BP Location: Left arm   Patient Position: Sitting   Cuff Size: Adult   Pulse: 61   Temp: 97.8 °F (36.6 °C)   TempSrc: Oral   SpO2: 98%   Weight: 68.6 kg (151 lb 4.8 oz)   Height: 175.3 cm (69\")   PainSc: 0-No pain       Estimated body mass index is 22.34 kg/m² as calculated from the following:    Height as of this encounter: 175.3 cm (69\").    Weight as of this encounter: 68.6 kg (151 lb " 4.8 oz).    BMI is within normal parameters. No other follow-up for BMI required.           Does the patient have evidence of cognitive impairment? No                                                                                                Health  Risk Assessment    Smoking Status:  Social History     Tobacco Use   Smoking Status Never    Passive exposure: Yes   Smokeless Tobacco Never     Alcohol Consumption:  Social History     Substance and Sexual Activity   Alcohol Use Never       Fall Risk Screen  STEADI Fall Risk Assessment was completed, and patient is at LOW risk for falls.Assessment completed on:2025    Depression Screening   Little interest or pleasure in doing things? Not at all   Feeling down, depressed, or hopeless? Not at all   PHQ-2 Total Score 0      Health Habits and Functional and Cognitive Screenin/31/2025    12:52 PM   Functional & Cognitive Status   Do you have difficulty preparing food and eating? No   Do you have difficulty bathing yourself, getting dressed or grooming yourself? No   Do you have difficulty using the toilet? No   Do you have difficulty moving around from place to place? No   Do you have trouble with steps or getting out of a bed or a chair? No   Current Diet Well Balanced Diet   Dental Exam Not up to date   Eye Exam Up to date   Exercise (times per week) 7 times per week   Current Exercises Include Walking   Do you need help using the phone?  No   Are you deaf or do you have serious difficulty hearing?  No   Do you need help to go to places out of walking distance? No   Do you need help shopping? No   Do you need help preparing meals?  No   Do you need help with housework?  No   Do you need help with laundry? No   Do you need help taking your medications? No   Do you need help managing money? No   Do you ever drive or ride in a car without wearing a seat belt? No   Have you felt unusual stress, anger or loneliness in the last month? No   Who do you live  with? Other   If you need help, do you have trouble finding someone available to you? No   Have you been bothered in the last four weeks by sexual problems? No   Do you have difficulty concentrating, remembering or making decisions? No           Age-appropriate Screening Schedule:  Refer to the list below for future screening recommendations based on patient's age, sex and/or medical conditions. Orders for these recommended tests are listed in the plan section. The patient has been provided with a written plan.    Health Maintenance List  Health Maintenance   Topic Date Due    TDAP/TD VACCINES (1 - Tdap) Never done    ZOSTER VACCINE (1 of 2) Never done    RSV Vaccine - Adults (1 - 1-dose 75+ series) Never done    DIABETIC EYE EXAM  06/16/2022    LIPID PANEL  07/11/2024    COVID-19 Vaccine (5 - 2024-25 season) 09/01/2024    ANNUAL WELLNESS VISIT  10/25/2024    HEMOGLOBIN A1C  01/31/2025    INFLUENZA VACCINE  Completed    Pneumococcal Vaccine 65+  Completed    URINE MICROALBUMIN  Discontinued                                                                                                                                                CMS Preventative Services Quick Reference  Risk Factors Identified During Encounter  Immunizations Discussed/Encouraged: Tdap, Influenza, Shingrix, and RSV (Respiratory Syncytial Virus)  Inactivity/Sedentary: Patient was advised to exercise at least 150 minutes a week per CDC recommendations.  Dental Screening Recommended  Vision Screening Recommended    The above risks/problems have been discussed with the patient.  Pertinent information has been shared with the patient in the After Visit Summary.  An After Visit Summary and PPPS were made available to the patient.    Follow Up:   Next Medicare Wellness visit to be scheduled in 1 year.          Additional E&M Note during same encounter follows:  Patient has multiple medical problems which are significant and separately identifiable that  require additional work above and beyond the Medicare Wellness Visit.      Chief Complaint  Medicare Wellness-subsequent    Arvind Coates is a 89 y.o. male who presents to [facility]     History of Present Illness  The patient is an 89-year-old male who presents today for a subsequent Medicare annual wellness visit and follow-up on hypertension, hyperlipidemia, type 2 diabetes, and chronic atrial fibrillation.    He reports a general sense of well-being. He has not experienced any recent falls at home. He maintains his independence, including driving, cooking, cleaning, dressing, and bathing himself. He has not received the shingles vaccine due to previous advice from his physician, despite having had shingles twice in the past. He has received his influenza vaccine. He has not received RSV vaccine. He resides in [facility] and it takes him 40 to 45 minutes to reach here.    He has been diagnosed with basal cell carcinoma on his face, which is scheduled for treatment on 03/20/2025. The lesion has shown some improvement but remains unhealed. Initial treatment involved cryotherapy, which was also applied to his ears, resulting in bleeding from one ear.    He experienced respiratory distress this morning, which he attributes to a potential cold. He administered a breathing treatment, which provided relief. He has noticed a decrease in his exercise capacity, particularly during the winter months, and attributes this to cold weather. He does not believe he requires an inhaler at this time. He uses the nebulizer as needed.    His cardiologist, Dr. Jackson, manages his atrial fibrillation and monitors his Coumadin levels. A recent blood test revealed normal results. His pacemaker, implanted in 1998, was recently checked and found to be functioning well, with an estimated battery life of 1.5 years remaining.    He has discontinued Jardiance due to cost and has switched to Actos for his  "diabetes management.    SOCIAL HISTORY  He does not smoke. He does not drink alcohol.    MEDICATIONS  Current: Coumadin, Actos  Discontinued: Jardiance    IMMUNIZATIONS  He has received his influenza vaccine. He has not received the shingles vaccine. He has not received RSV vaccine.    Objective   Vital Signs:   Vitals:    01/31/25 1243   BP: 134/62   BP Location: Left arm   Patient Position: Sitting   Cuff Size: Adult   Pulse: 61   Temp: 97.8 °F (36.6 °C)   TempSrc: Oral   SpO2: 98%   Weight: 68.6 kg (151 lb 4.8 oz)   Height: 175.3 cm (69\")   PainSc: 0-No pain       Wt Readings from Last 3 Encounters:   01/31/25 68.6 kg (151 lb 4.8 oz)   09/17/24 69 kg (152 lb 3.2 oz)   08/14/24 68.2 kg (150 lb 4.8 oz)     BP Readings from Last 3 Encounters:   01/31/25 134/62   09/17/24 138/69   08/14/24 156/81       Physical Exam  Vitals reviewed.   Constitutional:       Appearance: Normal appearance. He is well-developed.   HENT:      Head: Normocephalic and atraumatic.   Eyes:      Conjunctiva/sclera: Conjunctivae normal.      Pupils: Pupils are equal, round, and reactive to light.   Cardiovascular:      Rate and Rhythm: Normal rate and regular rhythm.      Heart sounds: Murmur heard.      No friction rub. No gallop.   Pulmonary:      Effort: Pulmonary effort is normal. No accessory muscle usage or respiratory distress.      Breath sounds: Normal breath sounds. No wheezing or rhonchi.   Abdominal:      General: Bowel sounds are normal. There is no distension.      Palpations: Abdomen is soft.      Tenderness: There is no abdominal tenderness.   Musculoskeletal:      Right lower leg: No edema.      Left lower leg: No edema.   Skin:     General: Skin is warm and dry.   Neurological:      Mental Status: He is alert and oriented to person, place, and time.      Cranial Nerves: No cranial nerve deficit.   Psychiatric:         Mood and Affect: Mood and affect normal.         Behavior: Behavior normal.         Thought Content: Thought " content normal.         Judgment: Judgment normal.         Physical Exam  Lungs are clear.  Heart was examined.    The following data was reviewed by OLIVIA Nazario on 01/31/2025  Common Labs   Common labs          7/31/2024    14:32   Common Labs   Glucose 73    BUN 23    Creatinine 1.48    Sodium 141    Potassium 4.1    Chloride 105    Calcium 10.1    Albumin 4.1    Total Bilirubin 0.6    Alkaline Phosphatase 63    AST (SGOT) 18    ALT (SGPT) 10    WBC 5.63    Hemoglobin 11.4    Hematocrit 35.1    Platelets 154    Hemoglobin A1C 6.20        Results          Assessment & Plan   Diagnoses and all orders for this visit:    1. Medicare annual wellness visit, subsequent (Primary)    2. Primary hypertension  -     CBC With Manual Differential  -     Comprehensive Metabolic Panel  -     Hemoglobin A1c    3. Mixed hyperlipidemia  -     CBC With Manual Differential  -     Comprehensive Metabolic Panel  -     Hemoglobin A1c    4. Atrial fibrillation, unspecified type    5. Pacemaker    6. Pulmonary emphysema, unspecified emphysema type    7. Type 2 diabetes mellitus with stage 3a chronic kidney disease, without long-term current use of insulin  -     CBC With Manual Differential  -     Comprehensive Metabolic Panel  -     Hemoglobin A1c  -     Microalbumin / Creatinine Urine Ratio - Urine, Clean Catch    8. Stage 3a chronic kidney disease  -     CBC With Manual Differential  -     Comprehensive Metabolic Panel    9. Basal cell carcinoma (BCC) of skin of other part of face    10. Need for influenza vaccination  -     Fluzone High-Dose 65+yrs        Assessment & Plan  1. Medicare annual wellness visit.  He is independent with activities of daily living and continues to drive. He has not experienced any recent falls. He is advised to receive the shingles vaccine and the RSV vaccine at his pharmacy. Blood work will be conducted today to assess blood counts, kidney function, liver function, and A1c levels.    2.  Basal cell carcinoma.  He has a spot on his face that dermatology suspects to be basal cell carcinoma. He is scheduled for further treatment on 03/20/2025.    3. Hypertension.  Continue current management.    4. Hyperlipidemia.  Continue current management.    5. Type 2 diabetes mellitus.  He is currently taking Actos for diabetes management as Jardiance was too expensive. Continue current management.    6. Chronic atrial fibrillation.  He is under the care of Dr. Jackson in [place] for monitoring of his A. fib and Coumadin levels. Recent blood work showed stable levels. Continue current management.    7. Respiratory distress.  He reported shortness of breath, which may be due to decreased activity. He is advised to continue using the nebulizer as needed. If respiratory distress worsens, he should inform us so that an inhaler can be prescribed.    Follow-up  The patient will follow up in 6 months.         BMI is within normal parameters. No other follow-up for BMI required.       FOLLOW UP  Return in about 6 months (around 7/31/2025) for Next scheduled follow up.  Patient was given instructions and counseling regarding his condition or for health maintenance advice. Please see specific information pulled into the AVS if appropriate.     Patient or patient representative verbalized consent for the use of Ambient Listening during the visit with  OLIVIA Nazario for chart documentation. 1/31/2025  13:27 EST    OLIVIA Nazario  01/31/25  13:26 EST

## 2025-02-05 DIAGNOSIS — D64.9 ANEMIA, UNSPECIFIED TYPE: Primary | ICD-10-CM

## 2025-02-05 DIAGNOSIS — N18.32 STAGE 3B CHRONIC KIDNEY DISEASE: Primary | ICD-10-CM

## 2025-02-11 DIAGNOSIS — D64.9 ANEMIA, UNSPECIFIED TYPE: Primary | ICD-10-CM

## 2025-02-11 DIAGNOSIS — Z86.2 HISTORY OF IRON DEFICIENCY ANEMIA: ICD-10-CM

## 2025-02-25 ENCOUNTER — HOSPITAL ENCOUNTER (OUTPATIENT)
Dept: ULTRASOUND IMAGING | Facility: HOSPITAL | Age: OVER 89
Discharge: HOME | End: 2025-02-25
Payer: MEDICARE

## 2025-02-25 DIAGNOSIS — N18.32 STAGE 3B CHRONIC KIDNEY DISEASE: ICD-10-CM

## 2025-02-25 PROCEDURE — 76775 US EXAM ABDO BACK WALL LIM: CPT

## 2025-03-10 ENCOUNTER — CLINICAL SUPPORT (OUTPATIENT)
Dept: FAMILY MEDICINE CLINIC | Facility: CLINIC | Age: OVER 89
End: 2025-03-10
Payer: MEDICARE

## 2025-03-10 DIAGNOSIS — Z86.2 HISTORY OF IRON DEFICIENCY ANEMIA: ICD-10-CM

## 2025-03-10 DIAGNOSIS — D64.9 ANEMIA, UNSPECIFIED TYPE: ICD-10-CM

## 2025-03-10 LAB
BASOPHILS # BLD AUTO: 0.02 10*3/MM3 (ref 0–0.2)
BASOPHILS NFR BLD AUTO: 0.4 % (ref 0–1.5)
DEPRECATED RDW RBC AUTO: 53.3 FL (ref 37–54)
EOSINOPHIL # BLD AUTO: 0.15 10*3/MM3 (ref 0–0.4)
EOSINOPHIL NFR BLD AUTO: 3.2 % (ref 0.3–6.2)
ERYTHROCYTE [DISTWIDTH] IN BLOOD BY AUTOMATED COUNT: 14.5 % (ref 12.3–15.4)
FERRITIN SERPL-MCNC: 130 NG/ML (ref 30–400)
HCT VFR BLD AUTO: 29.1 % (ref 37.5–51)
HGB BLD-MCNC: 9.6 G/DL (ref 13–17.7)
IMM GRANULOCYTES # BLD AUTO: 0.02 10*3/MM3 (ref 0–0.05)
IMM GRANULOCYTES NFR BLD AUTO: 0.4 % (ref 0–0.5)
IRON 24H UR-MRATE: 56 MCG/DL (ref 59–158)
IRON SATN MFR SERPL: 21 % (ref 20–50)
LYMPHOCYTES # BLD AUTO: 1.73 10*3/MM3 (ref 0.7–3.1)
LYMPHOCYTES NFR BLD AUTO: 37.4 % (ref 19.6–45.3)
MCH RBC QN AUTO: 33 PG (ref 26.6–33)
MCHC RBC AUTO-ENTMCNC: 33 G/DL (ref 31.5–35.7)
MCV RBC AUTO: 100 FL (ref 79–97)
MONOCYTES # BLD AUTO: 0.42 10*3/MM3 (ref 0.1–0.9)
MONOCYTES NFR BLD AUTO: 9.1 % (ref 5–12)
NEUTROPHILS NFR BLD AUTO: 2.28 10*3/MM3 (ref 1.7–7)
NEUTROPHILS NFR BLD AUTO: 49.5 % (ref 42.7–76)
NRBC BLD AUTO-RTO: 0 /100 WBC (ref 0–0.2)
PLATELET # BLD AUTO: 155 10*3/MM3 (ref 140–450)
PMV BLD AUTO: 11.1 FL (ref 6–12)
RBC # BLD AUTO: 2.91 10*6/MM3 (ref 4.14–5.8)
TIBC SERPL-MCNC: 262 MCG/DL (ref 298–536)
TRANSFERRIN SERPL-MCNC: 176 MG/DL (ref 200–360)
WBC NRBC COR # BLD AUTO: 4.62 10*3/MM3 (ref 3.4–10.8)

## 2025-03-10 PROCEDURE — 83540 ASSAY OF IRON: CPT

## 2025-03-10 PROCEDURE — 82728 ASSAY OF FERRITIN: CPT

## 2025-03-10 PROCEDURE — 36415 COLL VENOUS BLD VENIPUNCTURE: CPT

## 2025-03-10 PROCEDURE — 85025 COMPLETE CBC W/AUTO DIFF WBC: CPT

## 2025-03-10 PROCEDURE — 84466 ASSAY OF TRANSFERRIN: CPT

## 2025-03-14 ENCOUNTER — TRANSCRIBE ORDERS (OUTPATIENT)
Dept: ADMINISTRATIVE | Facility: HOSPITAL | Age: OVER 89
End: 2025-03-14
Payer: MEDICARE

## 2025-03-14 DIAGNOSIS — N18.30 STAGE 3 CHRONIC KIDNEY DISEASE, UNSPECIFIED WHETHER STAGE 3A OR 3B CKD: Primary | ICD-10-CM

## 2025-03-14 DIAGNOSIS — D64.9 ANEMIA, UNSPECIFIED TYPE: Primary | ICD-10-CM

## 2025-04-24 ENCOUNTER — HOSPITAL ENCOUNTER (OUTPATIENT)
Dept: ULTRASOUND IMAGING | Facility: HOSPITAL | Age: OVER 89
Discharge: HOME | End: 2025-04-24
Admitting: INTERNAL MEDICINE
Payer: MEDICARE

## 2025-04-24 DIAGNOSIS — N18.30 STAGE 3 CHRONIC KIDNEY DISEASE, UNSPECIFIED WHETHER STAGE 3A OR 3B CKD: ICD-10-CM

## 2025-04-24 PROCEDURE — 76775 US EXAM ABDO BACK WALL LIM: CPT

## 2025-05-22 ENCOUNTER — TELEPHONE (OUTPATIENT)
Dept: FAMILY MEDICINE CLINIC | Facility: CLINIC | Age: OVER 89
End: 2025-05-22
Payer: MEDICARE

## 2025-05-22 ENCOUNTER — HOSPITAL ENCOUNTER (INPATIENT)
Facility: HOSPITAL | Age: OVER 89
LOS: 1 days | Discharge: HOME | End: 2025-05-24
Attending: EMERGENCY MEDICINE | Admitting: STUDENT IN AN ORGANIZED HEALTH CARE EDUCATION/TRAINING PROGRAM
Payer: MEDICARE

## 2025-05-22 ENCOUNTER — APPOINTMENT (OUTPATIENT)
Dept: GENERAL RADIOLOGY | Facility: HOSPITAL | Age: OVER 89
End: 2025-05-22
Payer: MEDICARE

## 2025-05-22 ENCOUNTER — APPOINTMENT (OUTPATIENT)
Dept: CT IMAGING | Facility: HOSPITAL | Age: OVER 89
End: 2025-05-22
Payer: MEDICARE

## 2025-05-22 DIAGNOSIS — R13.10 DYSPHAGIA, UNSPECIFIED TYPE: ICD-10-CM

## 2025-05-22 DIAGNOSIS — I50.9 ACUTE CONGESTIVE HEART FAILURE, UNSPECIFIED HEART FAILURE TYPE: Primary | ICD-10-CM

## 2025-05-22 DIAGNOSIS — J18.9 PNEUMONIA OF RIGHT LOWER LOBE DUE TO INFECTIOUS ORGANISM: ICD-10-CM

## 2025-05-22 PROBLEM — R06.02 SOB (SHORTNESS OF BREATH): Status: ACTIVE | Noted: 2025-05-22

## 2025-05-22 LAB
ALBUMIN SERPL-MCNC: 3.5 G/DL (ref 3.5–5.2)
ALBUMIN/GLOB SERPL: 0.9 G/DL
ALP SERPL-CCNC: 69 U/L (ref 39–117)
ALT SERPL W P-5'-P-CCNC: 9 U/L (ref 1–41)
ANION GAP SERPL CALCULATED.3IONS-SCNC: 14.2 MMOL/L (ref 5–15)
ANISOCYTOSIS BLD QL: NORMAL
AST SERPL-CCNC: 17 U/L (ref 1–40)
BASOPHILS # BLD AUTO: 0.02 10*3/MM3 (ref 0–0.2)
BASOPHILS NFR BLD AUTO: 0.5 % (ref 0–1.5)
BILIRUB SERPL-MCNC: 0.6 MG/DL (ref 0–1.2)
BUN SERPL-MCNC: 26 MG/DL (ref 8–23)
BUN/CREAT SERPL: 16.9 (ref 7–25)
CALCIUM SPEC-SCNC: 9.7 MG/DL (ref 8.6–10.5)
CHLORIDE SERPL-SCNC: 106 MMOL/L (ref 98–107)
CO2 SERPL-SCNC: 20.8 MMOL/L (ref 22–29)
CREAT SERPL-MCNC: 1.54 MG/DL (ref 0.76–1.27)
D-LACTATE SERPL-SCNC: 1.1 MMOL/L (ref 0.5–2)
DEPRECATED RDW RBC AUTO: 78.3 FL (ref 37–54)
EGFRCR SERPLBLD CKD-EPI 2021: 42.9 ML/MIN/1.73
EOSINOPHIL # BLD AUTO: 0.16 10*3/MM3 (ref 0–0.4)
EOSINOPHIL NFR BLD AUTO: 4.1 % (ref 0.3–6.2)
ERYTHROCYTE [DISTWIDTH] IN BLOOD BY AUTOMATED COUNT: 19.1 % (ref 12.3–15.4)
GEN 5 1HR TROPONIN T REFLEX: 44 NG/L
GLOBULIN UR ELPH-MCNC: 4 GM/DL
GLUCOSE SERPL-MCNC: 122 MG/DL (ref 65–99)
HCT VFR BLD AUTO: 28.3 % (ref 37.5–51)
HGB BLD-MCNC: 9 G/DL (ref 13–17.7)
HOLD SPECIMEN: NORMAL
HOLD SPECIMEN: NORMAL
IMM GRANULOCYTES # BLD AUTO: 0.01 10*3/MM3 (ref 0–0.05)
IMM GRANULOCYTES NFR BLD AUTO: 0.3 % (ref 0–0.5)
INR PPP: 2.38 (ref 0.86–1.15)
LYMPHOCYTES # BLD AUTO: 0.95 10*3/MM3 (ref 0.7–3.1)
LYMPHOCYTES NFR BLD AUTO: 24.6 % (ref 19.6–45.3)
MACROCYTES BLD QL SMEAR: NORMAL
MCH RBC QN AUTO: 35.2 PG (ref 26.6–33)
MCHC RBC AUTO-ENTMCNC: 31.8 G/DL (ref 31.5–35.7)
MCV RBC AUTO: 110.5 FL (ref 79–97)
MONOCYTES # BLD AUTO: 0.41 10*3/MM3 (ref 0.1–0.9)
MONOCYTES NFR BLD AUTO: 10.6 % (ref 5–12)
NEUTROPHILS NFR BLD AUTO: 2.31 10*3/MM3 (ref 1.7–7)
NEUTROPHILS NFR BLD AUTO: 59.9 % (ref 42.7–76)
NRBC BLD AUTO-RTO: 0 /100 WBC (ref 0–0.2)
NT-PROBNP SERPL-MCNC: 6510 PG/ML (ref 0–1800)
PLATELET # BLD AUTO: 169 10*3/MM3 (ref 140–450)
PMV BLD AUTO: 10.1 FL (ref 6–12)
POTASSIUM SERPL-SCNC: 4.1 MMOL/L (ref 3.5–5.2)
PROCALCITONIN SERPL-MCNC: 0.07 NG/ML (ref 0–0.25)
PROT SERPL-MCNC: 7.5 G/DL (ref 6–8.5)
PROTHROMBIN TIME: 27.2 SECONDS (ref 11.8–14.9)
QT INTERVAL: 505 MS
QTC INTERVAL: 508 MS
RBC # BLD AUTO: 2.56 10*6/MM3 (ref 4.14–5.8)
SMALL PLATELETS BLD QL SMEAR: ADEQUATE
SODIUM SERPL-SCNC: 141 MMOL/L (ref 136–145)
TROPONIN T % DELTA: -2
TROPONIN T NUMERIC DELTA: -1 NG/L
TROPONIN T SERPL HS-MCNC: 45 NG/L
WBC MORPH BLD: NORMAL
WBC NRBC COR # BLD AUTO: 3.86 10*3/MM3 (ref 3.4–10.8)
WHOLE BLOOD HOLD COAG: NORMAL
WHOLE BLOOD HOLD SPECIMEN: NORMAL

## 2025-05-22 PROCEDURE — 87040 BLOOD CULTURE FOR BACTERIA: CPT | Performed by: EMERGENCY MEDICINE

## 2025-05-22 PROCEDURE — 85610 PROTHROMBIN TIME: CPT | Performed by: EMERGENCY MEDICINE

## 2025-05-22 PROCEDURE — 99285 EMERGENCY DEPT VISIT HI MDM: CPT

## 2025-05-22 PROCEDURE — 71250 CT THORAX DX C-: CPT

## 2025-05-22 PROCEDURE — 25010000002 FUROSEMIDE PER 20 MG: Performed by: EMERGENCY MEDICINE

## 2025-05-22 PROCEDURE — 83605 ASSAY OF LACTIC ACID: CPT | Performed by: EMERGENCY MEDICINE

## 2025-05-22 PROCEDURE — 82607 VITAMIN B-12: CPT | Performed by: INTERNAL MEDICINE

## 2025-05-22 PROCEDURE — 99223 1ST HOSP IP/OBS HIGH 75: CPT | Performed by: STUDENT IN AN ORGANIZED HEALTH CARE EDUCATION/TRAINING PROGRAM

## 2025-05-22 PROCEDURE — 93005 ELECTROCARDIOGRAM TRACING: CPT

## 2025-05-22 PROCEDURE — 83880 ASSAY OF NATRIURETIC PEPTIDE: CPT | Performed by: EMERGENCY MEDICINE

## 2025-05-22 PROCEDURE — 84484 ASSAY OF TROPONIN QUANT: CPT | Performed by: EMERGENCY MEDICINE

## 2025-05-22 PROCEDURE — 80053 COMPREHEN METABOLIC PANEL: CPT | Performed by: EMERGENCY MEDICINE

## 2025-05-22 PROCEDURE — G0378 HOSPITAL OBSERVATION PER HR: HCPCS

## 2025-05-22 PROCEDURE — 25010000002 AZITHROMYCIN PER 500 MG: Performed by: EMERGENCY MEDICINE

## 2025-05-22 PROCEDURE — 85007 BL SMEAR W/DIFF WBC COUNT: CPT | Performed by: EMERGENCY MEDICINE

## 2025-05-22 PROCEDURE — 25810000003 SODIUM CHLORIDE 0.9 % SOLUTION 250 ML FLEX CONT: Performed by: EMERGENCY MEDICINE

## 2025-05-22 PROCEDURE — 84145 PROCALCITONIN (PCT): CPT | Performed by: EMERGENCY MEDICINE

## 2025-05-22 PROCEDURE — 36415 COLL VENOUS BLD VENIPUNCTURE: CPT

## 2025-05-22 PROCEDURE — 25010000002 CEFTRIAXONE PER 250 MG: Performed by: EMERGENCY MEDICINE

## 2025-05-22 PROCEDURE — 93005 ELECTROCARDIOGRAM TRACING: CPT | Performed by: EMERGENCY MEDICINE

## 2025-05-22 PROCEDURE — 85025 COMPLETE CBC W/AUTO DIFF WBC: CPT | Performed by: EMERGENCY MEDICINE

## 2025-05-22 PROCEDURE — 71045 X-RAY EXAM CHEST 1 VIEW: CPT

## 2025-05-22 RX ORDER — AMOXICILLIN 250 MG
1 CAPSULE ORAL AS NEEDED
COMMUNITY

## 2025-05-22 RX ORDER — IPRATROPIUM BROMIDE AND ALBUTEROL SULFATE 2.5; .5 MG/3ML; MG/3ML
3 SOLUTION RESPIRATORY (INHALATION) EVERY 6 HOURS PRN
Status: DISCONTINUED | OUTPATIENT
Start: 2025-05-22 | End: 2025-05-24 | Stop reason: HOSPADM

## 2025-05-22 RX ORDER — WARFARIN SODIUM 2 MG/1
1 TABLET ORAL 2 TIMES WEEKLY
COMMUNITY

## 2025-05-22 RX ORDER — SODIUM CHLORIDE 9 MG/ML
40 INJECTION, SOLUTION INTRAVENOUS AS NEEDED
Status: DISCONTINUED | OUTPATIENT
Start: 2025-05-22 | End: 2025-05-24 | Stop reason: HOSPADM

## 2025-05-22 RX ORDER — HYDRALAZINE HYDROCHLORIDE 20 MG/ML
10 INJECTION INTRAMUSCULAR; INTRAVENOUS EVERY 6 HOURS PRN
Status: DISCONTINUED | OUTPATIENT
Start: 2025-05-22 | End: 2025-05-24 | Stop reason: HOSPADM

## 2025-05-22 RX ORDER — SODIUM CHLORIDE 0.9 % (FLUSH) 0.9 %
10 SYRINGE (ML) INJECTION AS NEEDED
Status: DISCONTINUED | OUTPATIENT
Start: 2025-05-22 | End: 2025-05-24 | Stop reason: HOSPADM

## 2025-05-22 RX ORDER — FAMOTIDINE 20 MG/1
20 TABLET, FILM COATED ORAL
Status: DISCONTINUED | OUTPATIENT
Start: 2025-05-23 | End: 2025-05-23

## 2025-05-22 RX ORDER — FUROSEMIDE 10 MG/ML
40 INJECTION INTRAMUSCULAR; INTRAVENOUS EVERY 12 HOURS
Status: DISCONTINUED | OUTPATIENT
Start: 2025-05-22 | End: 2025-05-24 | Stop reason: HOSPADM

## 2025-05-22 RX ORDER — POLYETHYLENE GLYCOL 3350 17 G/17G
17 POWDER, FOR SOLUTION ORAL DAILY PRN
Status: DISCONTINUED | OUTPATIENT
Start: 2025-05-22 | End: 2025-05-24 | Stop reason: HOSPADM

## 2025-05-22 RX ORDER — SODIUM CHLORIDE 0.9 % (FLUSH) 0.9 %
10 SYRINGE (ML) INJECTION EVERY 12 HOURS SCHEDULED
Status: DISCONTINUED | OUTPATIENT
Start: 2025-05-22 | End: 2025-05-24 | Stop reason: HOSPADM

## 2025-05-22 RX ORDER — DOXYCYCLINE 100 MG/1
100 CAPSULE ORAL EVERY 12 HOURS SCHEDULED
Status: DISCONTINUED | OUTPATIENT
Start: 2025-05-22 | End: 2025-05-23

## 2025-05-22 RX ORDER — ACETAMINOPHEN 325 MG/1
650 TABLET ORAL EVERY 6 HOURS PRN
Status: DISCONTINUED | OUTPATIENT
Start: 2025-05-22 | End: 2025-05-24 | Stop reason: HOSPADM

## 2025-05-22 RX ORDER — ONDANSETRON 2 MG/ML
4 INJECTION INTRAMUSCULAR; INTRAVENOUS EVERY 6 HOURS PRN
Status: DISCONTINUED | OUTPATIENT
Start: 2025-05-22 | End: 2025-05-24 | Stop reason: HOSPADM

## 2025-05-22 RX ORDER — NITROGLYCERIN 0.4 MG/1
0.4 TABLET SUBLINGUAL
Status: DISCONTINUED | OUTPATIENT
Start: 2025-05-22 | End: 2025-05-24 | Stop reason: HOSPADM

## 2025-05-22 RX ORDER — FUROSEMIDE 10 MG/ML
60 INJECTION INTRAMUSCULAR; INTRAVENOUS ONCE
Status: COMPLETED | OUTPATIENT
Start: 2025-05-22 | End: 2025-05-22

## 2025-05-22 RX ORDER — AMOXICILLIN 250 MG
2 CAPSULE ORAL 2 TIMES DAILY PRN
Status: DISCONTINUED | OUTPATIENT
Start: 2025-05-22 | End: 2025-05-24 | Stop reason: HOSPADM

## 2025-05-22 RX ORDER — BISACODYL 10 MG
10 SUPPOSITORY, RECTAL RECTAL DAILY PRN
Status: DISCONTINUED | OUTPATIENT
Start: 2025-05-22 | End: 2025-05-24 | Stop reason: HOSPADM

## 2025-05-22 RX ORDER — BISACODYL 5 MG/1
5 TABLET, DELAYED RELEASE ORAL DAILY PRN
Status: DISCONTINUED | OUTPATIENT
Start: 2025-05-22 | End: 2025-05-24 | Stop reason: HOSPADM

## 2025-05-22 RX ORDER — IPRATROPIUM BROMIDE AND ALBUTEROL SULFATE 2.5; .5 MG/3ML; MG/3ML
3 SOLUTION RESPIRATORY (INHALATION) EVERY 4 HOURS PRN
COMMUNITY

## 2025-05-22 RX ADMIN — CEFTRIAXONE SODIUM 2000 MG: 2 INJECTION, POWDER, FOR SOLUTION INTRAMUSCULAR; INTRAVENOUS at 22:52

## 2025-05-22 RX ADMIN — FUROSEMIDE 60 MG: 10 INJECTION, SOLUTION INTRAMUSCULAR; INTRAVENOUS at 22:49

## 2025-05-22 RX ADMIN — AZITHROMYCIN DIHYDRATE 500 MG: 500 INJECTION, POWDER, LYOPHILIZED, FOR SOLUTION INTRAVENOUS at 23:00

## 2025-05-22 NOTE — TELEPHONE ENCOUNTER
staff stated patient's daughter called for an appt for her father. He is having shortness of breath and can't lay down. His legs are swelling and weeping. They made patient and appt for tomorrow, 05/23/25 with Radha Klein. I attempted to call patient's daughter to discuss her taking patient to the ER to be evaluated. She did not answer so I left a voicemail with that information.

## 2025-05-23 ENCOUNTER — APPOINTMENT (OUTPATIENT)
Dept: GENERAL RADIOLOGY | Facility: HOSPITAL | Age: OVER 89
End: 2025-05-23
Payer: MEDICARE

## 2025-05-23 ENCOUNTER — APPOINTMENT (OUTPATIENT)
Dept: CARDIOLOGY | Facility: HOSPITAL | Age: OVER 89
End: 2025-05-23
Payer: MEDICARE

## 2025-05-23 LAB
ANION GAP SERPL CALCULATED.3IONS-SCNC: 17.6 MMOL/L (ref 5–15)
AORTIC DIMENSIONLESS INDEX: 0.32 (DI)
AV MEAN PRESS GRAD SYS DOP V1V2: 12 MMHG
AV VMAX SYS DOP: 244 CM/SEC
BASOPHILS # BLD AUTO: 0.02 10*3/MM3 (ref 0–0.2)
BASOPHILS NFR BLD AUTO: 0.5 % (ref 0–1.5)
BH CV ECHO MEAS - AI P1/2T: 976.3 MSEC
BH CV ECHO MEAS - AO MAX PG: 23.8 MMHG
BH CV ECHO MEAS - AO ROOT DIAM: 3.4 CM
BH CV ECHO MEAS - AO V2 VTI: 58.2 CM
BH CV ECHO MEAS - AVA(I,D): 1.22 CM2
BH CV ECHO MEAS - EDV(CUBED): 91.1 ML
BH CV ECHO MEAS - EDV(MOD-SP2): 116 ML
BH CV ECHO MEAS - EDV(MOD-SP4): 118 ML
BH CV ECHO MEAS - EF(MOD-SP2): 55.2 %
BH CV ECHO MEAS - EF(MOD-SP4): 55.6 %
BH CV ECHO MEAS - ESV(CUBED): 35.9 ML
BH CV ECHO MEAS - ESV(MOD-SP2): 52 ML
BH CV ECHO MEAS - ESV(MOD-SP4): 52.4 ML
BH CV ECHO MEAS - FS: 26.7 %
BH CV ECHO MEAS - IVS/LVPW: 0.8 CM
BH CV ECHO MEAS - IVSD: 0.8 CM
BH CV ECHO MEAS - LA DIMENSION: 4.5 CM
BH CV ECHO MEAS - LAT PEAK E' VEL: 6.6 CM/SEC
BH CV ECHO MEAS - LV DIASTOLIC VOL/BSA (35-75): 63.7 CM2
BH CV ECHO MEAS - LV MASS(C)D: 132.8 GRAMS
BH CV ECHO MEAS - LV MAX PG: 2.7 MMHG
BH CV ECHO MEAS - LV MEAN PG: 1 MMHG
BH CV ECHO MEAS - LV SYSTOLIC VOL/BSA (12-30): 28.3 CM2
BH CV ECHO MEAS - LV V1 MAX: 81.4 CM/SEC
BH CV ECHO MEAS - LV V1 VTI: 18.7 CM
BH CV ECHO MEAS - LVIDD: 4.5 CM
BH CV ECHO MEAS - LVIDS: 3.3 CM
BH CV ECHO MEAS - LVOT AREA: 3.8 CM2
BH CV ECHO MEAS - LVOT DIAM: 2.2 CM
BH CV ECHO MEAS - LVPWD: 1 CM
BH CV ECHO MEAS - MED PEAK E' VEL: 5.2 CM/SEC
BH CV ECHO MEAS - MV A MAX VEL: 66.1 CM/SEC
BH CV ECHO MEAS - MV DEC SLOPE: 810 CM/SEC2
BH CV ECHO MEAS - MV DEC TIME: 0.13 SEC
BH CV ECHO MEAS - MV E MAX VEL: 106 CM/SEC
BH CV ECHO MEAS - MV E/A: 1.6
BH CV ECHO MEAS - PA V2 MAX: 96.9 CM/SEC
BH CV ECHO MEAS - PI END-D VEL: 137.5 CM/SEC
BH CV ECHO MEAS - SV(LVOT): 71.1 ML
BH CV ECHO MEAS - SV(MOD-SP2): 64 ML
BH CV ECHO MEAS - SV(MOD-SP4): 65.6 ML
BH CV ECHO MEAS - SVI(LVOT): 38.3 ML/M2
BH CV ECHO MEAS - SVI(MOD-SP2): 34.5 ML/M2
BH CV ECHO MEAS - SVI(MOD-SP4): 35.4 ML/M2
BH CV ECHO MEAS - TAPSE (>1.6): 1.9 CM
BH CV ECHO MEAS - TR MAX PG: 39.9 MMHG
BH CV ECHO MEAS - TR MAX VEL: 316 CM/SEC
BH CV ECHO MEASUREMENTS AVERAGE E/E' RATIO: 17.97
BH CV XLRA - TDI S': 8.4 CM/SEC
BUN SERPL-MCNC: 28 MG/DL (ref 8–23)
BUN/CREAT SERPL: 18.2 (ref 7–25)
CALCIUM SPEC-SCNC: 10.2 MG/DL (ref 8.6–10.5)
CHLORIDE SERPL-SCNC: 102 MMOL/L (ref 98–107)
CO2 SERPL-SCNC: 23.4 MMOL/L (ref 22–29)
CREAT SERPL-MCNC: 1.54 MG/DL (ref 0.76–1.27)
DEPRECATED RDW RBC AUTO: 77.4 FL (ref 37–54)
EGFRCR SERPLBLD CKD-EPI 2021: 42.9 ML/MIN/1.73
EOSINOPHIL # BLD AUTO: 0.16 10*3/MM3 (ref 0–0.4)
EOSINOPHIL NFR BLD AUTO: 3.6 % (ref 0.3–6.2)
ERYTHROCYTE [DISTWIDTH] IN BLOOD BY AUTOMATED COUNT: 19 % (ref 12.3–15.4)
GLUCOSE SERPL-MCNC: 125 MG/DL (ref 65–99)
HCT VFR BLD AUTO: 31.2 % (ref 37.5–51)
HGB BLD-MCNC: 9.7 G/DL (ref 13–17.7)
IMM GRANULOCYTES # BLD AUTO: 0.01 10*3/MM3 (ref 0–0.05)
IMM GRANULOCYTES NFR BLD AUTO: 0.2 % (ref 0–0.5)
INR PPP: 2.22 (ref 0.86–1.15)
IVRT: 71 MS
LEFT ATRIUM VOLUME INDEX: 55.7 ML/M2
LV EF BIPLANE MOD: 55.9 %
LYMPHOCYTES # BLD AUTO: 0.9 10*3/MM3 (ref 0.7–3.1)
LYMPHOCYTES NFR BLD AUTO: 20.4 % (ref 19.6–45.3)
MCH RBC QN AUTO: 34 PG (ref 26.6–33)
MCHC RBC AUTO-ENTMCNC: 31.1 G/DL (ref 31.5–35.7)
MCV RBC AUTO: 109.5 FL (ref 79–97)
MONOCYTES # BLD AUTO: 0.39 10*3/MM3 (ref 0.1–0.9)
MONOCYTES NFR BLD AUTO: 8.8 % (ref 5–12)
NEUTROPHILS NFR BLD AUTO: 2.94 10*3/MM3 (ref 1.7–7)
NEUTROPHILS NFR BLD AUTO: 66.5 % (ref 42.7–76)
NRBC BLD AUTO-RTO: 0 /100 WBC (ref 0–0.2)
PLATELET # BLD AUTO: 175 10*3/MM3 (ref 140–450)
PMV BLD AUTO: 10.4 FL (ref 6–12)
POTASSIUM SERPL-SCNC: 3.8 MMOL/L (ref 3.5–5.2)
PROTHROMBIN TIME: 25.7 SECONDS (ref 11.8–14.9)
RBC # BLD AUTO: 2.85 10*6/MM3 (ref 4.14–5.8)
SODIUM SERPL-SCNC: 143 MMOL/L (ref 136–145)
TROPONIN T SERPL HS-MCNC: 49 NG/L
WBC NRBC COR # BLD AUTO: 4.42 10*3/MM3 (ref 3.4–10.8)

## 2025-05-23 PROCEDURE — 25810000003 SODIUM CHLORIDE 0.9 % SOLUTION 500 ML FLEX CONT: Performed by: STUDENT IN AN ORGANIZED HEALTH CARE EDUCATION/TRAINING PROGRAM

## 2025-05-23 PROCEDURE — 92610 EVALUATE SWALLOWING FUNCTION: CPT

## 2025-05-23 PROCEDURE — 80048 BASIC METABOLIC PNL TOTAL CA: CPT | Performed by: STUDENT IN AN ORGANIZED HEALTH CARE EDUCATION/TRAINING PROGRAM

## 2025-05-23 PROCEDURE — 25010000002 FUROSEMIDE PER 20 MG: Performed by: STUDENT IN AN ORGANIZED HEALTH CARE EDUCATION/TRAINING PROGRAM

## 2025-05-23 PROCEDURE — 85610 PROTHROMBIN TIME: CPT | Performed by: STUDENT IN AN ORGANIZED HEALTH CARE EDUCATION/TRAINING PROGRAM

## 2025-05-23 PROCEDURE — 74230 X-RAY XM SWLNG FUNCJ C+: CPT

## 2025-05-23 PROCEDURE — 25010000002 CEFTRIAXONE PER 250 MG: Performed by: INTERNAL MEDICINE

## 2025-05-23 PROCEDURE — 25010000002 DOXYCYCLINE 100 MG RECONSTITUTED SOLUTION 1 EACH VIAL: Performed by: INTERNAL MEDICINE

## 2025-05-23 PROCEDURE — 99233 SBSQ HOSP IP/OBS HIGH 50: CPT | Performed by: INTERNAL MEDICINE

## 2025-05-23 PROCEDURE — 85025 COMPLETE CBC W/AUTO DIFF WBC: CPT | Performed by: STUDENT IN AN ORGANIZED HEALTH CARE EDUCATION/TRAINING PROGRAM

## 2025-05-23 PROCEDURE — 36415 COLL VENOUS BLD VENIPUNCTURE: CPT | Performed by: STUDENT IN AN ORGANIZED HEALTH CARE EDUCATION/TRAINING PROGRAM

## 2025-05-23 PROCEDURE — 93306 TTE W/DOPPLER COMPLETE: CPT | Performed by: INTERNAL MEDICINE

## 2025-05-23 PROCEDURE — 25010000002 ONDANSETRON PER 1 MG: Performed by: STUDENT IN AN ORGANIZED HEALTH CARE EDUCATION/TRAINING PROGRAM

## 2025-05-23 PROCEDURE — 92611 MOTION FLUOROSCOPY/SWALLOW: CPT

## 2025-05-23 PROCEDURE — 93306 TTE W/DOPPLER COMPLETE: CPT

## 2025-05-23 PROCEDURE — 84484 ASSAY OF TROPONIN QUANT: CPT | Performed by: STUDENT IN AN ORGANIZED HEALTH CARE EDUCATION/TRAINING PROGRAM

## 2025-05-23 RX ORDER — METOPROLOL SUCCINATE 50 MG/1
50 TABLET, EXTENDED RELEASE ORAL
Status: DISCONTINUED | OUTPATIENT
Start: 2025-05-23 | End: 2025-05-24 | Stop reason: HOSPADM

## 2025-05-23 RX ORDER — WARFARIN SODIUM 1 MG/1
1 TABLET ORAL
Status: COMPLETED | OUTPATIENT
Start: 2025-05-23 | End: 2025-05-23

## 2025-05-23 RX ORDER — WARFARIN SODIUM 1 MG/1
1 TABLET ORAL ONCE
Status: DISCONTINUED | OUTPATIENT
Start: 2025-05-23 | End: 2025-05-23

## 2025-05-23 RX ORDER — ATORVASTATIN CALCIUM 40 MG/1
40 TABLET, FILM COATED ORAL NIGHTLY
Status: DISCONTINUED | OUTPATIENT
Start: 2025-05-23 | End: 2025-05-24 | Stop reason: HOSPADM

## 2025-05-23 RX ORDER — ASPIRIN 81 MG/1
81 TABLET, CHEWABLE ORAL DAILY
Status: DISCONTINUED | OUTPATIENT
Start: 2025-05-23 | End: 2025-05-24 | Stop reason: HOSPADM

## 2025-05-23 RX ORDER — FAMOTIDINE 20 MG/1
20 TABLET, FILM COATED ORAL DAILY
Status: DISCONTINUED | OUTPATIENT
Start: 2025-05-24 | End: 2025-05-24 | Stop reason: HOSPADM

## 2025-05-23 RX ORDER — LISINOPRIL 10 MG/1
10 TABLET ORAL
Status: DISCONTINUED | OUTPATIENT
Start: 2025-05-23 | End: 2025-05-24 | Stop reason: HOSPADM

## 2025-05-23 RX ORDER — TETRAHYDROZOLINE HCL 0.05 %
1 DROPS OPHTHALMIC (EYE) 3 TIMES DAILY
Status: DISCONTINUED | OUTPATIENT
Start: 2025-05-23 | End: 2025-05-24 | Stop reason: HOSPADM

## 2025-05-23 RX ADMIN — ASPIRIN 81 MG: 81 TABLET, CHEWABLE ORAL at 08:23

## 2025-05-23 RX ADMIN — BARIUM SULFATE 50 ML: 400 SUSPENSION ORAL at 10:13

## 2025-05-23 RX ADMIN — WARFARIN SODIUM 1 MG: 1 TABLET ORAL at 18:01

## 2025-05-23 RX ADMIN — Medication 1 DROP: at 15:55

## 2025-05-23 RX ADMIN — FUROSEMIDE 40 MG: 10 INJECTION, SOLUTION INTRAMUSCULAR; INTRAVENOUS at 01:45

## 2025-05-23 RX ADMIN — ATORVASTATIN CALCIUM 40 MG: 40 TABLET, FILM COATED ORAL at 23:08

## 2025-05-23 RX ADMIN — DOXYCYCLINE 100 MG: 100 CAPSULE ORAL at 08:24

## 2025-05-23 RX ADMIN — Medication 10 ML: at 23:08

## 2025-05-23 RX ADMIN — LISINOPRIL 10 MG: 10 TABLET ORAL at 08:24

## 2025-05-23 RX ADMIN — BARIUM SULFATE 55 ML: 0.81 POWDER, FOR SUSPENSION ORAL at 10:13

## 2025-05-23 RX ADMIN — ONDANSETRON 4 MG: 2 INJECTION INTRAMUSCULAR; INTRAVENOUS at 15:55

## 2025-05-23 RX ADMIN — Medication 10 ML: at 08:24

## 2025-05-23 RX ADMIN — DOXYCYCLINE 100 MG: 100 INJECTION, POWDER, LYOPHILIZED, FOR SOLUTION INTRAVENOUS at 13:56

## 2025-05-23 RX ADMIN — FUROSEMIDE 40 MG: 10 INJECTION, SOLUTION INTRAMUSCULAR; INTRAVENOUS at 23:08

## 2025-05-23 RX ADMIN — FUROSEMIDE 40 MG: 10 INJECTION, SOLUTION INTRAMUSCULAR; INTRAVENOUS at 12:04

## 2025-05-23 RX ADMIN — CEFTRIAXONE SODIUM 1000 MG: 1 INJECTION, POWDER, FOR SOLUTION INTRAMUSCULAR; INTRAVENOUS at 13:56

## 2025-05-23 RX ADMIN — BARIUM SULFATE 25 ML: 400 PASTE ORAL at 10:13

## 2025-05-23 RX ADMIN — METOPROLOL SUCCINATE 50 MG: 50 TABLET, EXTENDED RELEASE ORAL at 08:24

## 2025-05-23 RX ADMIN — SENNOSIDES, DOCUSATE SODIUM 2 TABLET: 50; 8.6 TABLET, FILM COATED ORAL at 08:27

## 2025-05-23 RX ADMIN — SODIUM CHLORIDE 40 ML: 900 INJECTION, SOLUTION INTRAVENOUS at 13:56

## 2025-05-23 RX ADMIN — Medication 10 ML: at 02:47

## 2025-05-23 NOTE — PROGRESS NOTES
[facility] Clinical Pharmacy Services: Renal Dose Adjustment     Famotidine has been appropriately renally dose adjusted based on our System P&T approved policy. Pharmacy will continue to monitor patient renal function while in-house.     Wen Gerard [facility]  Clinical Pharmacist

## 2025-05-23 NOTE — PLAN OF CARE
Goal Outcome Evaluation:  Plan of Care Reviewed With: patient        Progress: improving  Outcome Evaluation: Pt new admit to [facility] from ED this shift. A&Ox4, on room air. Skin assessment performed with Adriana GARCIA RN. Pt failed bedside dysphagia screen, strict NPO order and slp consult placed per protocol. Pt up ad [place]wyn, frequently going to bathroom throughout night as a result of IV lasix. No needs at this time.

## 2025-05-23 NOTE — ED PROVIDER NOTES
Time: 11:01 PM EDT  Date of encounter:  5/22/2025  Independent Historian/Clinical History and Information was obtained by:   Patient  Chief Complaint: Swelling and shortness of breath    History is limited by: N/A    History of Present Illness:  Patient is a 89 y.o. year old male who presents to the emergency department for evaluation of lower extremity edema as well as shortness of air.  Patient reports symptoms began about 4 days ago.  Patient reports he has had significant swelling involving both of his lower legs.  Patient also states that he feels increasing shortness of breath.  He describes it as a smothering sensation.  Patient denies fever.  Patient denies a cough.    Patient reports that his kidney doctor took away one of his medications recently for concerns that was affecting his kidneys.  Patient feels like shortly after that is when all of his current symptoms started occurring.  Patient cannot recall what medication it was.    Patient Care Team  Primary Care Provider: Radha Klein APRN    Past Medical History:     No Known Allergies  Past Medical History:   Diagnosis Date    Anemia     Bleeding     Bleeding issues post 1  colonoscopy and during 1 prep- caused to pass out    Broken bones     CHF (congestive heart failure)     Chronic kidney disease     COVID-19     3/2021    Hemorrhoids     Hyperlipidemia     Hypertension     Pneumonia     Pre-diabetes     Shortness of breath      Past Surgical History:   Procedure Laterality Date    APPENDECTOMY      BONE MARROW BIOPSY      COLONOSCOPY      2012?- normal per patient    COLONOSCOPY N/A 08/10/2022    Procedure: COLONOSCOPY WITH ORISE INJECTION/POLYPECTOMY /SNARE/CLIP APPLICATION X9;  Surgeon: Steven Holm MD;  Location: [facility] ENDOSCOPY;  Service: Gastroenterology;  Laterality: N/A;  COLON POLYPS  DIVERTICULOSIS    CORONARY ARTERY BYPASS GRAFT      triple    ENDOSCOPY N/A 08/10/2022    Procedure: ESOPHAGOGASTRODUODENOSCOPY WITH  BIOPSIES;  Surgeon: Steven Holm MD;  Location: [facility] ENDOSCOPY;  Service: Gastroenterology;  Laterality: N/A;  HIATAL HERNIA  ERIK ULCER    HEMORRHOIDECTOMY      UPPER GASTROINTESTINAL ENDOSCOPY       Family History   Problem Relation Age of Onset    Colon cancer Brother 55    Prostate cancer Brother     Diabetes Other     Malig Hyperthermia Neg Hx        Home Medications:  Prior to Admission medications    Medication Sig Start Date End Date Taking? Authorizing Provider   albuterol sulfate  (90 Base) MCG/ACT inhaler Inhale 2 puffs Every 6 (Six) Hours As Needed. 5/5/21   Jewels Love MD   aspirin 81 MG chewable tablet aspirin 81 mg oral tablet,chewable chew 1 tablet (81 mg) by oral route once daily   Active 4/4/21   Jewels Love MD   atorvastatin (LIPITOR) 40 MG tablet atorvastatin 40 mg oral tablet take 1 tablet (40 mg) by oral route once daily at bedtime   Active 3/2/21   Jewels Love MD   Ipratropium-Albuterol (ALBUTEROL-IPRATROPIUM IN) Duo Neb 3 ml inhalation Q 4 hours as needed   Active    Jewels Love MD   lisinopril (PRINIVIL,ZESTRIL) 10 MG tablet lisinopril 10 mg oral tablet take 1 tablet (10 mg) by oral route once daily   Active 2/5/21   Jewels Love MD   metoprolol succinate XL (TOPROL-XL) 25 MG 24 hr tablet metoprolol succinate 25 mg oral tablet extended release 24 hr take 1 tablet (25 mg) by oral route once daily for 30 days   Suspended 4/4/21   Jewels Love MD   metoprolol succinate XL (TOPROL-XL) 50 MG 24 hr tablet Take 1 tablet by mouth Daily. 7/27/22   Jewels Love MD   omeprazole (priLOSEC) 40 MG capsule Take 1 capsule by mouth Daily. 9/9/24   Radha Klein APRN   pioglitazone (Actos) 15 MG tablet Take 1 tablet by mouth Daily. 1/17/25   Radha Klein APRN   warfarin (COUMADIN) 2 MG tablet Take 1 tablet by mouth Every Night. 3/9/21   Jewels Love MD        Social History:   Social History  "    Tobacco Use    Smoking status: Never     Passive exposure: Yes    Smokeless tobacco: Never   Vaping Use    Vaping status: Never Used   Substance Use Topics    Alcohol use: Never    Drug use: Never         Review of Systems:  Review of Systems   Respiratory:  Positive for shortness of breath.         Orthopnea   Cardiovascular:  Positive for leg swelling.        Physical Exam:  /76 (BP Location: Left arm, Patient Position: Lying)   Pulse 60   Temp 98.1 °F (36.7 °C)   Resp 16   Ht 175.3 cm (69\")   Wt 73 kg (160 lb 15 oz)   SpO2 100%   BMI 23.77 kg/m²     Physical Exam    Vital signs were reviewed under triage note.  General appearance - Patient appears well-developed and well-nourished.  Patient is in no acute distress.  Head - Normocephalic, atraumatic.  Pupils - Equal, round, reactive to light.  Extraocular muscles are intact.  Conjunctiva is clear.  Nasal - Normal inspection.  No evidence of trauma or epistaxis.  Tympanic membranes - Gray, intact without erythema or retractions.  Oral mucosa - Pink and moist without lesions or erythema.  Uvula is midline.  Chest wall - Atraumatic.  Chest wall is nontender.  There are no vesicular rashes noted.  Neck - Supple.  Trachea was midline.  There is no palpable lymphadenopathy or thyromegaly.  There are no meningeal signs  Lungs - Clear to auscultation and percussion bilaterally.  Heart - Regular rate and rhythm without any murmurs, clicks, or gallops.  Abdomen - Soft.  Bowel sounds are present.  There is no palpable tenderness.  There is no rebound, guarding, or rigidity.  There are no palpable masses.  There are no pulsatile masses.  Back - Spine is straight and midline.  There is no CVA tenderness.  Extremities - Intact x4 with full range of motion.  There is no palpable edema.  Pulses are intact x4 and equal.  Neurologic - Patient is awake, alert, and oriented x3.  Cranial nerves II through XII are grossly intact.  Motor and sensory functions grossly " intact.  Cerebellar function was normal.  Integument - There are no rashes.  There are no petechia or purpura lesions noted.  There are no vesicular lesions noted.          Procedures:  Procedures      Medical Decision Making:      Comorbidities that affect care:    CHF, hyperlipidemia, hypertension, chronic kidney disease, coronary artery disease    External Notes reviewed:    Previous Clinic Note: Office visit with Dr. Allen on 3/12/2025 was reviewed by me.      The following orders were placed and all results were independently analyzed by me:  Orders Placed This Encounter   Procedures    Blood Culture - Blood,    Blood Culture - Blood,    XR Chest 1 View    CT Chest Without Contrast Diagnostic    Bunch Draw    Comprehensive Metabolic Panel    BNP    High Sensitivity Troponin T    CBC Auto Differential    Scan Slide    High Sensitivity Troponin T 1Hr    Lactic Acid, Plasma    Procalcitonin    Protime-INR    NPO Diet NPO Type: Strict NPO    Undress & Gown    Continuous Pulse Oximetry    Vital Signs    Hospitalist (on-call MD unless specified)    Oxygen Therapy- Nasal Cannula; Titrate 1-6 LPM Per SpO2; 90 - 95%    ECG 12 Lead ED Triage Standing Order; SOA    Insert Peripheral IV    CBC & Differential    Green Top (Gel)    Lavender Top    Gold Top - SST    Light Blue Top       Medications Given in the Emergency Department:  Medications   sodium chloride 0.9 % flush 10 mL (has no administration in time range)   azithromycin (ZITHROMAX) 500 mg in sodium chloride 0.9 % 250 mL IVPB-VTB (500 mg Intravenous New Bag 5/22/25 2300)   furosemide (LASIX) injection 60 mg (60 mg Intravenous Given 5/22/25 2249)   cefTRIAXone (ROCEPHIN) in NS 2 GRAMS/20ml IV PUSH syringe (2,000 mg Intravenous Given 5/22/25 2252)        ED Course:    ED Course as of 05/23/25 2105   Fri May 23, 2025   2103 EKG performed at 1844 was interpreted by me to show ventricular paced rhythm at 61 bpm.  With no discernible P waves.  QRS interval is  widened at 160 ms.  Axis 21 degrees.  There is nonspecific ST-T wave change identified.  QT corrected is 580 ms. [TB]      ED Course User Index  [TB] Damian Jolley DO   The patient was seen and evaluated in the ED by me.  The above history and physical examination was performed as documented.  Diagnostic data was obtained.  Results reviewed.  Findings were discussed with the patient.  Patient appears to have some volume overload with exacerbation of CHF.  Patient was given IV Lasix.  The radiologist however is also concerned for the possibility of an ayala infiltrate.  Subsequently patient had blood cultures drawn and was started on community-acquired antibiotics for pneumonia.  I ordered a CT scan of the chest for further evaluation.  This is pending at time of admission.  Dr. Cox, hospitalist, agreed admit the patient.  The patient is agreeable to this treatment plan.    Labs:    Lab Results (last 24 hours)       Procedure Component Value Units Date/Time    CBC & Differential [[id number]]  (Abnormal) Collected: 05/22/25 1958    Specimen: Blood Updated: 05/22/25 2025    Narrative:      The following orders were created for panel order CBC & Differential.  Procedure                               Abnormality         Status                     ---------                               -----------         ------                     CBC Auto Differential[[id number]]        Abnormal            Final result               Scan Slide[[id number]]                                       Final result                 Please view results for these tests on the individual orders.    Comprehensive Metabolic Panel [[id number]]  (Abnormal) Collected: 05/22/25 1958    Specimen: Blood Updated: 05/22/25 2027     Glucose 122 mg/dL      BUN 26 mg/dL      Creatinine 1.54 mg/dL      Sodium 141 mmol/L      Potassium 4.1 mmol/L      Chloride 106 mmol/L      CO2 20.8 mmol/L      Calcium 9.7 mg/dL      Total Protein 7.5 g/dL      Albumin 3.5 g/dL       ALT (SGPT) 9 U/L      AST (SGOT) 17 U/L      Alkaline Phosphatase 69 U/L      Total Bilirubin 0.6 mg/dL      Globulin 4.0 gm/dL      A/G Ratio 0.9 g/dL      BUN/Creatinine Ratio 16.9     Anion Gap 14.2 mmol/L      eGFR 42.9 mL/min/1.73     Narrative:      GFR Categories in Chronic Kidney Disease (CKD)              GFR Category          GFR (mL/min/1.73)    Interpretation  G1                    90 or greater        Normal or high (1)  G2                    60-89                Mild decrease (1)  G3a                   45-59                Mild to moderate decrease  G3b                   30-44                Moderate to severe decrease  G4                    15-29                Severe decrease  G5                    14 or less           Kidney failure    (1)In the absence of evidence of kidney disease, neither GFR category G1 or G2 fulfill the criteria for CKD.    eGFR calculation 2021 CKD-EPI creatinine equation, which does not include race as a factor    BNP [[id number]]  (Abnormal) Collected: 05/22/25 1958    Specimen: Blood Updated: 05/22/25 2031     proBNP 6,510.0 pg/mL     Narrative:      This assay is used as an aid in the diagnosis of individuals suspected of having heart failure. It can be used as an aid in the diagnosis of acute decompensated heart failure (ADHF) in patients presenting with signs and symptoms of ADHF to the emergency department (ED). In addition, NT-proBNP of <300 pg/mL indicates ADHF is not likely.    Age Range Result Interpretation  NT-proBNP Concentration (pg/mL:      <50             Positive            >450                   Gray                 300-450                    Negative             <300    50-75           Positive            >900                  Gray                300-900                  Negative            <300      >75             Positive            >1800                  Gray                300-1800                  Negative            <300    High Sensitivity  Troponin T [[id number]]  (Abnormal) Collected: 05/22/25 1958    Specimen: Blood Updated: 05/22/25 2031     HS Troponin T 45 ng/L     Narrative:      High Sensitive Troponin T Reference Range:  <14.0 ng/L- Negative Female for AMI  <22.0 ng/L- Negative Male for AMI  >=14 - Abnormal Female indicating possible myocardial injury.  >=22 - Abnormal Male indicating possible myocardial injury.   Clinicians would have to utilize clinical acumen, EKG, Troponin, and serial changes to determine if it is an Acute Myocardial Infarction or myocardial injury due to an underlying chronic condition.         CBC Auto Differential [[id number]]  (Abnormal) Collected: 05/22/25 1958    Specimen: Blood Updated: 05/22/25 2025     WBC 3.86 10*3/mm3      RBC 2.56 10*6/mm3      Hemoglobin 9.0 g/dL      Hematocrit 28.3 %      .5 fL      MCH 35.2 pg      MCHC 31.8 g/dL      RDW 19.1 %      RDW-SD 78.3 fl      MPV 10.1 fL      Platelets 169 10*3/mm3      Neutrophil % 59.9 %      Lymphocyte % 24.6 %      Monocyte % 10.6 %      Eosinophil % 4.1 %      Basophil % 0.5 %      Immature Grans % 0.3 %      Neutrophils, Absolute 2.31 10*3/mm3      Lymphocytes, Absolute 0.95 10*3/mm3      Monocytes, Absolute 0.41 10*3/mm3      Eosinophils, Absolute 0.16 10*3/mm3      Basophils, Absolute 0.02 10*3/mm3      Immature Grans, Absolute 0.01 10*3/mm3      nRBC 0.0 /100 WBC     Scan Slide [[id number]] Collected: 05/22/25 1958    Specimen: Blood Updated: 05/22/25 2025     Anisocytosis Slight/1+     Macrocytes Slight/1+     WBC Morphology Normal     Platelet Estimate Adequate    Protime-INR [[id number]]  (Abnormal) Collected: 05/22/25 1958    Specimen: Blood Updated: 05/22/25 2234     Protime 27.2 Seconds      INR 2.38    Narrative:      Suggested Therapeutic Ranges For Oral Anticoagulant Therapy:  Level of Therapy                      INR Target Range  Standard Dose                            2.0-3.0  High Dose                                2.5-3.5  Patients not  "receiving anticoagulant  Therapy Normal Range                     0.86-1.15    High Sensitivity Troponin T 1Hr [[id number]]  (Abnormal) Collected: 05/22/25 2155    Specimen: Blood from Arm, Right Updated: 05/22/25 2218     HS Troponin T 44 ng/L      Troponin T Numeric Delta -1 ng/L      Troponin T % Delta -2    Narrative:      High Sensitive Troponin T Reference Range:  <14.0 ng/L- Negative Female for AMI  <22.0 ng/L- Negative Male for AMI  >=14 - Abnormal Female indicating possible myocardial injury.  >=22 - Abnormal Male indicating possible myocardial injury.   Clinicians would have to utilize clinical acumen, EKG, Troponin, and serial changes to determine if it is an Acute Myocardial Infarction or myocardial injury due to an underlying chronic condition.         Procalcitonin [[id number]]  (Normal) Collected: 05/22/25 2155    Specimen: Blood from Arm, Right Updated: 05/22/25 2249     Procalcitonin 0.07 ng/mL     Narrative:      As a Marker for Sepsis (Non-Neonates):    1. <0.5 ng/mL represents a low risk of severe sepsis and/or septic shock.  2. >2 ng/mL represents a high risk of severe sepsis and/or septic shock.    As a Marker for Lower Respiratory Tract Infections that require antibiotic therapy:    PCT on Admission    Antibiotic Therapy       6-12 Hrs later    >0.5                Strongly Recommended  >0.25 - <0.5        Recommended  0.1 - 0.25          Discouraged              Remeasure/reassess PCT  <0.1                Strongly Discouraged     Remeasure/reassess PCT    As 28 day mortality risk marker: \"Change in Procalcitonin Result\" (>80% or <=80%) if Day 0 (or Day 1) and Day 4 values are available. Refer to http://www.[facility]-pct-calculator.com    Change in PCT <=80%  A decrease of PCT levels below or equal to 80% defines a positive change in PCT test result representing a higher risk for 28-day all-cause mortality of patients diagnosed with severe sepsis for septic shock.    Change in PCT >80%  A decrease " of PCT levels of more than 80% defines a negative change in PCT result representing a lower risk for 28-day all-cause mortality of patients diagnosed with severe sepsis or septic shock.    This test is Prognostic not Diagnostic, if elevated correlate with clinical findings before administering antibiotic treatment.        Blood Culture - Blood, Arm, Left [[id number]] Collected: 05/22/25 2237    Specimen: Blood from Arm, Left Updated: 05/22/25 2241    Blood Culture - Blood, Arm, Right [[id number]] Collected: 05/22/25 2237    Specimen: Blood from Arm, Right Updated: 05/22/25 2242    Lactic Acid, Plasma [[id number]] Collected: 05/22/25 2237    Specimen: Blood from Arm, Right Updated: 05/22/25 2241             Imaging:    XR Chest 1 View  Result Date: 5/22/2025  XR CHEST 1 VW Date of Exam: 5/22/2025 6:52 PM EDT Indication: SOA Triage Protocol Comparison: 9/27/2021 Findings: Lines: Unchanged position of left subclavian dual-lead pacemaker. Lungs: Patchy opacities and mild consolidative changes noted within the right lung. There is mild diffuse interstitial thickening, similar in appearance to prior study, nonspecific. Pleura: Possible trace bilateral pleural effusions. No pneumothorax. Possible pleural thickening noted within the left lung apex Cardiomediastinum: Postsurgical changes noted within the mediastinum. Findings suggestive of a moderate-sized hiatal/paraesophageal hernia. Soft Tissues: Unremarkable. Bones: No acute osseous abnormality.     Impression: 1.Patchy opacities and mild consolidative changes noted within the right lung, suspicious for pneumonia. 2.Mild diffuse interstitial thickening, similar in appearance to prior study, nonspecific. This may represent interstitial lung disease, emphysema or mild pulmonary vascular congestion 3.Possible trace bilateral pleural effusions. Electronically Signed: Akash Smith DO  5/22/2025 7:06 PM EDT  Workstation ID: [id number]        Differential Diagnosis and  Discussion:    Dyspnea: Differential diagnosis includes but is not limited to metabolic acidosis, neurological disorders, psychogenic, asthma, pneumothorax, upper airway obstruction, COPD, pneumonia, noncardiogenic pulmonary edema, interstitial lung disease, anemia, congestive heart failure, and pulmonary embolism  Edema: Based on the patient's history, signs, and symptoms, the differential diagnosis includes but is not limited to heart failure, kidney disease, liver disease, venous insufficiency, lymphedema, pregnancy, medications, allergic reactions.    Labs were collected in the emergency department and all labs were reviewed and interpreted by me.  X-ray were performed in the emergency department and all X-ray impressions were independently interpreted by me.  An EKG was performed and the EKG was interpreted by me.  CT scan was performed in the emergency department and the CT scan radiology impression was interpreted by me.    MDM         Patient Care Considerations:    SEPSIS was considered but is NOT present in the emergency department as SIRS criteria is not present.      Consultants/Shared Management Plan:    Hospitalist: I have discussed the case with Dr. Cox who agrees to accept the patient for admission.    Social Determinants of Health:    Patient is independent, reliable, and has access to care.       Disposition and Care Coordination:    Admit:   Through independent evaluation of the patient's history, physical, and imperical data, the patient meets criteria for inpatient admission to the hospital.        Final diagnoses:   Acute congestive heart failure, unspecified heart failure type   Pneumonia of right lower lobe due to infectious organism        ED Disposition       ED Disposition   Decision to Admit    Condition   --    Comment   --               This medical record created using voice recognition software.             Damian Jolley DO  05/23/25 5538

## 2025-05-23 NOTE — H&P
[facility]  [facility]   Patient Name: Arvind Coates  : 1935  MRN: [id number]  Primary Care Physician:  Radha Klein APRN  Date of admission: 2025    Subjective   Subjective     Chief Complaint: Short of breath and leg swelling    HPI:    Arvind Coates is a 89 y.o. male past medical history of CHF, CKD, hypertension and hyperlipidemia presents to the ED due to shortness of breath.  Patient states complaining of shortness of breath for the last couple days with lower extremity edema.  Denies fevers, chills, nausea, vomit, diarrhea, chest pain, abdominal pain or dysuria.  In the ED, patient's vitals showed temperature 98.1, heart rate 60 and blood pressure 160/76.  Labs show troponin 44, BNP 6000, sodium 141, creatinine 1.54, INR 2.38, white blood cell 3.8 and hemoglobin 9.0.  Chest ray shows patchy opacities concerning for pneumonia.  Interstitial thickening.  Patient admitted to floors for further management.    Personal History     Past Medical History:  Past Medical History:   Diagnosis Date    Anemia     Bleeding     Bleeding issues post 1  colonoscopy and during 1 prep- caused to pass out    Broken bones     CHF (congestive heart failure)     Chronic kidney disease     COVID-19     3/2021    Hemorrhoids     Hyperlipidemia     Hypertension     Pneumonia     Pre-diabetes     Shortness of breath        Past Surgical History:  Past Surgical History:   Procedure Laterality Date    APPENDECTOMY      BONE MARROW BIOPSY      COLONOSCOPY      ?- normal per patient    COLONOSCOPY N/A 08/10/2022    Procedure: COLONOSCOPY WITH ORISE INJECTION/POLYPECTOMY /SNARE/CLIP APPLICATION X9;  Surgeon: Steven Holm MD;  Location: [facility] ENDOSCOPY;  Service: Gastroenterology;  Laterality: N/A;  COLON POLYPS  DIVERTICULOSIS    CORONARY ARTERY BYPASS GRAFT      triple    ENDOSCOPY N/A 08/10/2022    Procedure: ESOPHAGOGASTRODUODENOSCOPY WITH BIOPSIES;   Surgeon: Steven Holm MD;  Location: [facility] ENDOSCOPY;  Service: Gastroenterology;  Laterality: N/A;  HIATAL HERNIA  ERIK ULCER    HEMORRHOIDECTOMY      UPPER GASTROINTESTINAL ENDOSCOPY         Family History:   Family History   Problem Relation Age of Onset    Colon cancer Brother 55    Prostate cancer Brother     Diabetes Other     Malig Hyperthermia Neg Hx        Social History:   Social History     Socioeconomic History    Marital status: Single   Tobacco Use    Smoking status: Never     Passive exposure: Yes    Smokeless tobacco: Never   Vaping Use    Vaping status: Never Used   Substance and Sexual Activity    Alcohol use: Never    Drug use: Never    Sexual activity: Defer       Home Medications:  aspirin, atorvastatin, ipratropium-albuterol, lisinopril, metoprolol succinate XL, pioglitazone, and warfarin    Allergies:  No Known Allergies    Review of Systems   All systems were reviewed and negative except for: Above    Objective   Objective     Vitals:   Temp:  [98.1 °F (36.7 °C)] 98.1 °F (36.7 °C)  Heart Rate:  [59-61] 60  Resp:  [13-18] 16  BP: (133-177)/(64-87) 161/76    Physical Exam    Constitutional: Awake, alert, no acute distress   Eyes: Pupils equal, sclerae anicteric, no conjunctival injection   HENT: NCAT, mucous membranes moist   Neck: Supple, no thyromegaly, no lymphadenopathy, trachea midline   Respiratory: Crackles bilaterally   Cardiovascular: RRR, no murmurs, rubs, or gallops, palpable pedal pulses bilaterally   Gastrointestinal: Positive bowel sounds, soft, nontender, nondistended   Musculoskeletal: No bilateral ankle edema, no clubbing or cyanosis to extremities   Psychiatric: Appropriate affect, cooperative   Neurologic: Oriented x 3, strength symmetric in all extremities, Cranial Nerves grossly intact to confrontation, speech clear   Skin: +2 lower extremity edema    Result Review    Result Review:  I have personally reviewed the results from the time of this admission to  5/22/2025 23:15 EDT and agree with these findings:  [x]  Laboratory  []  Microbiology  [x]  Radiology  []  EKG/Telemetry   []  Cardiology/Vascular   []  Pathology  [x]  Old records  []  Other:      Assessment & Plan   Assessment / Plan     Assessment/Plan:     Assessment  Acute on chronic diastolic heart failure  Acute hypoxemic respiratory distress  CAD status post CABG  CKD  Hyperlipidemia  Essential hypertension  Sick sinus syndrome status post pacemaker  Atrial fibrillation on warfarin    Plan  Admit to MedSurg  Telemetry  Monitor vitals  CBC BMP  Troponin trend  Blood cultures  Chest x-ray reviewed  CT chest pending  Echo ordered  Start home meds  IV Rocephin and doxycycline  Lasix 40 IV twice daily      VTE Prophylaxis:  Mechanical VTE prophylaxis orders are present.        CODE STATUS:    Code Status (Patient has no pulse and is not breathing): CPR (Attempt to Resuscitate)  Medical Interventions (Patient has pulse or is breathing): Full Support      Admission Status:  I believe this patient meets obs status.    Electronically signed by Oscar Cox MD, 05/22/25, 11:15 PM EDT.

## 2025-05-23 NOTE — PROGRESS NOTES
Pharmacy to Dose: Warfarin  Consulting provider: Kenny  Indication for warfarin: Afib  Goal INR range: 2 - 3  Home warfarin dose: warfarin 2 mg PO daily, except warfarin 1 mg PO on Tuesday and Friday.     Date INR Warfarin Dose Given   5/22 2.38 --   5/23 2.22 1 mg (ordered)                    Any Vitamin K given?: No  Drug interactions Reviewed: Yes. Details:   Ceftriaxone (hypoprothrombinemic effect of Warfarin may be increased)  Acetaminophen (may enhance the anticoagulant effect of Anticoagulants. This appears to be most likely with daily acetaminophen doses exceeding 2 g/day for multiple consecutive days.)  Doxycycline (May enhance the anticoagulant effect - anticipated onset is 2-5 days)  Is patient on bridging therapy with another anticoagulant?: No    Lab Results   Component Value Date     05/23/2025     05/22/2025    HGB 9.7 (L) 05/23/2025    HGB 9.0 (L) 05/22/2025       Plan:  Based on medication history that was completed yesterday, appears patient's last dose of warfarin at home was on 5/21. INR is currently therapeutic, will continue with home regimen and provide a one time dose on 1 mg today. INR ordered for tomorrow.     Thank you for this consult. Pharmacy will continue to monitor.   Wen Gerard [facility]

## 2025-05-23 NOTE — MBS/VFSS/FEES
Acute Care - Speech Language Pathology   Swallow  modified barium swallow study   Nicole     Patient Name: Arvind Coates  : 1935  MRN: [id number]  Today's Date: 2025               Admit Date: 2025    Visit Dx:     ICD-10-CM ICD-9-CM   1. Acute congestive heart failure, unspecified heart failure type  I50.9 428.0   2. Pneumonia of right lower lobe due to infectious organism  J18.9 486   3. Dysphagia, unspecified type  R13.10 787.20     Patient Active Problem List   Diagnosis    Atrial fibrillation    Cardiac pacemaker in situ    CHF (congestive heart failure)    Coronary atherosclerosis    Hemorrhoids    History of aortic valve replacement    Hyperlipidemia    Hypertension    Personal history of other drug therapy    Presence of aortocoronary bypass graft    Sick sinus syndrome    Supraventricular tachycardia    Stage 3a chronic kidney disease    Other specified anemias    Malabsorption due to intolerance, not elsewhere classified    Iron deficiency anemia    Weight loss    Positive occult stool blood test    Lower abdominal pain    Monoclonal gammopathy    Type 2 diabetes mellitus without complication, without long-term current use of insulin    SOB (shortness of breath)     Past Medical History:   Diagnosis Date    Anemia     Bleeding     Bleeding issues post 1  colonoscopy and during 1 prep- caused to pass out    Broken bones     CHF (congestive heart failure)     Chronic kidney disease     COVID-19     3/2021    Hemorrhoids     Hyperlipidemia     Hypertension     Pneumonia     Pre-diabetes     Shortness of breath      Past Surgical History:   Procedure Laterality Date    APPENDECTOMY      BONE MARROW BIOPSY      COLONOSCOPY      ?- normal per patient    COLONOSCOPY N/A 08/10/2022    Procedure: COLONOSCOPY WITH ORISE INJECTION/POLYPECTOMY /SNARE/CLIP APPLICATION X9;  Surgeon: Steven Holm MD;  Location: [facility] ENDOSCOPY;  Service: Gastroenterology;  Laterality: N/A;  COLON  POLYPS  DIVERTICULOSIS    CORONARY ARTERY BYPASS GRAFT      triple    ENDOSCOPY N/A 08/10/2022    Procedure: ESOPHAGOGASTRODUODENOSCOPY WITH BIOPSIES;  Surgeon: Steven Holm MD;  Location: [facility] ENDOSCOPY;  Service: Gastroenterology;  Laterality: N/A;  HIATAL HERNIA  ERIK ULCER    HEMORRHOIDECTOMY      UPPER GASTROINTESTINAL ENDOSCOPY         SLP Recommendation and Plan         MODIFIED BARIUM SWALLOW STUDY: SPEECH PATHOLOGY REPORT        DATE OF SERVICE:  5/23/25    PERTINENT INFORMATION:  Mr Coates is an 89 year old male with possible dysphagia.    He was referred for an MBSS by Dr. Carreon to rule out aspiration as well as to determine appropriate treatment plan for this patient.      PROCEDURE:    Mr Coates was alert and cooperative.  The patient was viewed in lateral plane.  The following Ba consistencies were administered:  thin barium, nectar thick barium, pudding thick barium, barium mixed with applesauce, barium mixed with cracker.  The following compensatory swallowing strategies were performed: bolus modification, cyclic ingestion.       RESULTS:    1.  Nectar thick liquid by cup. Swallow completed. Coating in the vallecula. Clears with second swallow.   2. Thin liquid by cup. Swallow completed.   3. Pureed. Spill over tonguebase. Swallow completed.   4. Pudding. Lingual pumping. Swallow completed.   5. Solid. Chewing with lingual pumping. Spill to vallecula. Swallow completed with residue in vallecula. Clears with liquid wash.   6. Thin liquid by straw. Swallow completed.       IMPRESSIONS:    Mr Coates demonstrated oral pharyngeal swallow appearing grossly within functional limits. No laryngeal penetration or tracheal aspiration observed during this study.        RECOMMENDATIONS:   1.  Diet: regular solids, thin liquids  2.  Positioning: fully upright for all po, 30 minutes following.   3.  Compensatory strategies: small bites and sips.         Yes, Patient/responsible party agrees with the  plan of care and has been informed of all alternatives, risks and benefits.    Thank you for this referral.                 Anticipated Discharge Disposition (SLP): unknown (05/23/25 0948)                                                               EDUCATION  The patient has been educated in the following areas:   Dysphagia (Swallowing Impairment).                Time Calculation:    Time Calculation- SLP       Row Name 05/23/25 0948             Time Calculation- SLP    SLP Start Time 0700  -SN      SLP Stop Time 0800  -SN      SLP Time Calculation (min) 60 min  -SN      SLP Received On 05/23/25  -SN         Untimed Charges    18274-UO Eval Oral Pharyng Swallow Minutes 60  -SN         Total Minutes    Untimed Charges Total Minutes 60  -SN       Total Minutes 60  -SN                User Key  (r) = Recorded By, (t) = Taken By, (c) = Cosigned By      Initials Name Provider Type    SN Ana Rosa Mckee MS-CCC/SLP, JOSE Speech and Language Pathologist                    Therapy Charges for Today       Code Description Service Date Service Provider Modifiers Qty    66469867489 HC ST EVAL ORAL PHARYNG SWALLOW 4 5/23/2025 Ana Rosa Mckee MS-CCC/SLP, CNT GN 1    [id number] HC ST MOTION FLUORO EVAL SWALLOW 6 5/23/2025 Ana Rosa Mckee MS-CCC/SLP, JOSE GN 1                 TOBIN Pastrana/DANA, JOSE  5/23/2025

## 2025-05-23 NOTE — PLAN OF CARE
Goal Outcome Evaluation:  Plan of Care Reviewed With: patient        Progress: no change  Outcome Evaluation: Patient had no complaints of pain just nausea, medicated per orders, getting up ad bronwyn to the bathroom.

## 2025-05-23 NOTE — PLAN OF CARE
Goal Outcome Evaluation:      ASSESSMENT/ PLAN OF CARE:  Patient not exhibiting overt s/s of aspiration at bedside. Modified barium swallow study to be completed this date to determine if there is silent aspiration. Further speech pathology services pending results of MBSS.   PROBLEMS:  1.  na   LTG 1: na   STG 1a: na     RECOMMENDATIONS:   1.   DIET: regular solids, thin liquids    2.  POSITION: fully upright for all po, 30 minutes following.     3.  COMPENSATORY STRATEGIES: small bites and sips    4. Modified barium swallow study to further investigate swallow function, rule out silent aspiration. Study to be completed 5/23/25.              Anticipated Discharge Disposition (SLP): unknown

## 2025-05-23 NOTE — THERAPY EVALUATION
Acute Care - Speech Language Pathology   Swallow Initial Evaluation  Nicole     Patient Name: Arvind Coates  : 1935  MRN: [id number]  Today's Date: 2025               Admit Date: 2025    Visit Dx:     ICD-10-CM ICD-9-CM   1. Acute congestive heart failure, unspecified heart failure type  I50.9 428.0   2. Pneumonia of right lower lobe due to infectious organism  J18.9 486   3. Dysphagia, unspecified type  R13.10 787.20     Patient Active Problem List   Diagnosis    Atrial fibrillation    Cardiac pacemaker in situ    CHF (congestive heart failure)    Coronary atherosclerosis    Hemorrhoids    History of aortic valve replacement    Hyperlipidemia    Hypertension    Personal history of other drug therapy    Presence of aortocoronary bypass graft    Sick sinus syndrome    Supraventricular tachycardia    Stage 3a chronic kidney disease    Other specified anemias    Malabsorption due to intolerance, not elsewhere classified    Iron deficiency anemia    Weight loss    Positive occult stool blood test    Lower abdominal pain    Monoclonal gammopathy    Type 2 diabetes mellitus without complication, without long-term current use of insulin    SOB (shortness of breath)     Past Medical History:   Diagnosis Date    Anemia     Bleeding     Bleeding issues post 1  colonoscopy and during 1 prep- caused to pass out    Broken bones     CHF (congestive heart failure)     Chronic kidney disease     COVID-19     3/2021    Hemorrhoids     Hyperlipidemia     Hypertension     Pneumonia     Pre-diabetes     Shortness of breath      Past Surgical History:   Procedure Laterality Date    APPENDECTOMY      BONE MARROW BIOPSY      COLONOSCOPY      ?- normal per patient    COLONOSCOPY N/A 08/10/2022    Procedure: COLONOSCOPY WITH ORISE INJECTION/POLYPECTOMY /SNARE/CLIP APPLICATION X9;  Surgeon: Steven Holm MD;  Location: [facility] ENDOSCOPY;  Service: Gastroenterology;  Laterality: N/A;  COLON  "POLYPS  DIVERTICULOSIS    CORONARY ARTERY BYPASS GRAFT      triple    ENDOSCOPY N/A 08/10/2022    Procedure: ESOPHAGOGASTRODUODENOSCOPY WITH BIOPSIES;  Surgeon: Stveen Holm MD;  Location: [facility] ENDOSCOPY;  Service: Gastroenterology;  Laterality: N/A;  HIATAL HERNIA  ERIK ULCER    HEMORRHOIDECTOMY      UPPER GASTROINTESTINAL ENDOSCOPY         SLP Recommendation and Plan                Inpatient Speech Pathology Dysphagia Evaluation        PAIN SCALE: none indicated    PRECAUTIONS/CONTRAINDICATIONS:  standard    SUSPECTED ABUSE/NEGLECT/EXPLOITATION:  none noted    SOCIAL/PSYCHOLOGICAL NEEDS/BARRIERS:  none noted    PAST SOCIAL HISTORY:  89 year old male, lives at home    PRIOR FUNCTION:  patient reports on a regular diet however occasionally gets \"choked\" on ice water.     PATIENT GOALS/EXPECTATIONS:  wants to eat and drink    HISTORY:  89 year old male with shortness of breath. He has medical history of CHF, HTN. He complains of shortness of breath for couple days as well as lower extremity edema. Chest xray shows patchy opacities concerning for pneumonia. Patient failed nursing dysphagia screening and is NPO. Speech pathology services consulted for dysphagia evaluation and further recommendations.     CURRENT DIET LEVEL:  npo    OBJECTIVE:    TEST ADMINISTERED:  clinical dysphagia evaluation    COGNITION/SAFETY AWARENESS:  appears appropriate for current environment    BEHAVIORAL OBSERVATIONS:  awake, pleasant, cooperative    ORAL MOTOR EXAM:  within normal limits    VOICE QUALITY:  clear    REFLEX EXAM:  deferred    POSTURE:  sitting fully upright in bed    FEEDING/SWALLOWING FUNCTION:  assessed with thin liquids, nectar thick liquids, pureed solids, regular solids    CLINICAL OBSERVATIONS:  Nectar thick liquids by spoon, cup and straw. Swallows completed with mild delay. Vocal quality clear and laryngeal sounds clear to auscultation. Thin liquids by spoon and cup. Swallows completed with mild delay. " Vocal quality clear and laryngeal sounds clear. Thin liquid by controlled straw drink. Swallow completed with mild delay. Burping/belching observed followed by delayed cough. Pureed by spoon. Swallow completed. Belching observed. Regular solids. Anterior chewing observed. Adequate oral manipulation. Swallow completed. Mild oral residue. Clears with single cup drink of thin liquid. Laryngeal elevation noted to palpation. No overt clinical s/s of aspiration observed at bedside however cannot rule out silent aspiration. Frequent belching/burping observed throughout evaluation suspicious for esophageal dysmotility.     DYSPHAGIA CRITERIA:  n/a    FUNCTIONAL ASSESSMENT INSTRUMENT: Patient currently scored a level 7 of 7 on Functional Communication Measures for swallowing indicating a 0% limitation in function.    ASSESSMENT/ PLAN OF CARE:  Patient not exhibiting overt s/s of aspiration at bedside. Modified barium swallow study to be completed this date to determine if there is silent aspiration. Further speech pathology services pending results of MBSS.   PROBLEMS:  1.  na   LTG 1: na   STG 1a: na     RECOMMENDATIONS:   1.   DIET: regular solids, thin liquids    2.  POSITION: fully upright for all po, 30 minutes following.     3.  COMPENSATORY STRATEGIES: small bites and sips    4. Modified barium swallow study to further investigate swallow function, rule out silent aspiration. Study to be completed 5/23/25.     Pt/responsible party agrees with plan of care and has been informed of all alternatives, risks and benefits.              Anticipated Discharge Disposition (SLP): unknown (05/23/25 0948)                                                               EDUCATION  The patient has been educated in the following areas:   Dysphagia (Swallowing Impairment).                Time Calculation:    Time Calculation- SLP       Row Name 05/23/25 0948             Time Calculation- SLP    SLP Start Time 0700  -SN      SLP Stop  Time 0800  -SN      SLP Time Calculation (min) 60 min  -SN      SLP Received On 05/23/25  -SN         Untimed Charges    74050-FM Eval Oral Pharyng Swallow Minutes 60  -SN         Total Minutes    Untimed Charges Total Minutes 60  -SN       Total Minutes 60  -SN                User Key  (r) = Recorded By, (t) = Taken By, (c) = Cosigned By      Initials Name Provider Type    SN Ana Rosa Mckee MS-CCC/SLP, JOSE Speech and Language Pathologist                    Therapy Charges for Today       Code Description Service Date Service Provider Modifiers Qty    [id number]  ST EVAL ORAL PHARYNG SWALLOW 4 5/23/2025 Ana Rosa Mckee MS-CCC/SLP, JOSE GN 1                 TOBIN Pastrana/DANA, JOSE  5/23/2025

## 2025-05-23 NOTE — PROGRESS NOTES
[facility] Progress Note      Patient Name:  Arvind Coates   MRN:  [id number]   :  1935   Date of Admission:  2025   Date of Service:  2025   PMD:  Radha Klein, [facility] Course:     89 y.o. male past medical history of CHF, CKD, hypertension and hyperlipidemia presents to the ED due to shortness of breath.  Patient states complaining of shortness of breath for the last couple days with lower extremity edema.  Denies fevers, chills, nausea, vomit, diarrhea, chest pain, abdominal pain or dysuria.     Emergency room course:  Vitals   In the ED, patient's vitals showed temperature 98.1, heart rate 60 and blood pressure 160/76.      Labs:  Show troponin 44, BNP 6000, sodium 141, creatinine 1.54, INR 2.38, white blood cell 3.8 and hemoglobin 9.0.  Chest ray shows patchy opacities concerning for pneumonia.  Interstitial thickening.  Patient admitted to floors for further management.     Consultants:    Physical therapy    Procedures:  Chest x-ray  CT chest    Anti-infectives:    Rocephin  Doxycycline       23 May 2025    Chief Complaint: Patient with cough and shortness of breath with bilateral lower extremity swelling since 2 days    Subjective/Events of last 24 hours  : Improved shortness of breath and cough and lower extremity edema after IV Lasix and antibiotics.  CT chest shows*lower lobe pneumonia with CHF    Review Of Systems:  GENERAL: Complains of weakness fatigue and tiredness no time is denies any weight loss appetite is normal.  HEENT:  Denies any rhinitis, no sore throat, no diplopia , hearing is normal  Respiratory: Complains of shortness of breath , no sweating, complains of dyspnea on exertion , cough or sputum.  CVS:  Denies any chest pain , palpitation or syncope.  Gi:  No nausea, no vomiting, no diarrhea, no hematemesis , no melena , no rectal bleeding  :  No dysuria frequency , hematuria, no retention:  Musculoskeletal:  Denies any arthritis,  "or calf pain..  Complains of lower extremity edema.  Hematological:  No bleeding , no petechiae.  Skin:  Denies rash , pruritus , jaundice  Endocrine:  No polyuria , polydipsia , polyphagia.  CNS:  Denies any confusion , headache , or seizure disorder  Psychiatry:  No anxiety , or depression, no suicide ideation      Objective:  Vitals:    05/23/25 0039 05/23/25 0100 05/23/25 0508 05/23/25 0730   BP: 155/66  170/70 145/56   BP Location: Left arm  Right arm Left arm   Patient Position: Sitting  Lying Lying   Pulse: 74  60 64   Resp: 18  18 18   Temp: 97.5 °F (36.4 °C)  97.6 °F (36.4 °C) 97.4 °F (36.3 °C)   TempSrc: Oral  Oral Oral   SpO2: 94%  100% 100%   Weight:  70.4 kg (155 lb 3.3 oz)     Height:  175.3 cm (69\")            Physical Exam:  GENERAL APPEARANCE: Alert and oriented.  Appears comfortable.  No acute distress  HEENT: Atraumatic, normocephalic,  PERRLA, mucous membranes moist  NECK: supple; no JVD or thyromegaly noted  CARDIOVASCULAR: Regular rate and rhythm, no murmurs appreciated; lower extremity edema present in BLE   RESPIRATORY: Harsh vesicular breathing with scattered rhonchi and crepitation audible bilaterally  GASTROINTESTINAL:  Abdomen  soft; bowel sounds present, non-tender, non-distended, no organomegaly, no CVA tenderness   EXTREMITIES: Pulses equal bilaterally   MUSCULOSKELETAL:  Good muscle strength noted no obvious deformity appreciat  PSYCH: Appropriate mood and affect  Neurologic:  Alert & oriented x 3.  Normal Mental status.  Normal Cranial Nervies.  EOMI.  EDIL.  Normal 5/5 muscular strength in both upper and lower extremities.  Normal sensation.  Normal and symmetric reflexes. Normal cerbellar function.  Normal Gait. Negative Babinski.    Labs Reviewed  CBC:    Lab Results   Component Value Date    WBC 4.42 05/23/2025    HGB 9.7 (L) 05/23/2025    HCT 31.2 (L) 05/23/2025    .5 (H) 05/23/2025     CMP:    Lab Results   Component Value Date     05/23/2025    CO2 23.4 " "05/23/2025    GLUCOSE 125 (H) 05/23/2025    BUN 28 (H) 05/23/2025    CREATININE 1.54 (H) 05/23/2025    ALBUMIN 3.5 05/22/2025    CALCIUM 10.2 05/23/2025    AST 17 05/22/2025    ALT 9 05/22/2025     Lipis Panel:   Lab Results   Component Value Date    CHOL 146 07/11/2023    HDL 44 07/11/2023      INR:    Lab Results   Component Value Date    INR 2.22 (H) 05/23/2025     Cardiac:  No results found for: \"CKMB\", \"TROPONIN\"  No results found for: \"BNP\"  Lactate:    Lab Results   Component Value Date    LACTATE 1.1 05/22/2025    LACTATE 2.9 (H) 03/22/2021    LACTATE 4.0 (H) 03/21/2021     Blood Culture:  @lastlabx(cult:2)@    Imaging:  CT Chest Without Contrast Diagnostic  Result Date: 5/22/2025  Narrative: CT CHEST WO CONTRAST DIAGNOSTIC-  Date of exam: 5/22/2025 10:36 PM.  Indication: Dyspnea.  Comparisons: 5/22/2025; 12/30/2021.  TECHNIQUE: Axial CT images were obtained of the chest without contrast administration. Reconstructed 2D coronal and sagittal images were also obtained. Automated exposure control and iterative construction methods were used. Additionally, the radiation dose reduction device was turned on for each scan per the ALARA (As Low as Reasonably Achievable) protocol. Please see the electronic medical record, EMR (i.e., Epic), for the documented radiation dose.  FINDINGS: There are small-to-moderate bilateral pleural effusions. There is suspected chronic interstitial lung disease. There may be superimposed acute infiltrates, especially in the lung bases. These findings likely represent pulmonary edema with vascular congestion. However, superimposed infectious multifocal pneumonia, especially in the lower lobes, greater on the right than the left, cannot be excluded. Please correlate clinically. There is stable mild cardiomegaly. There is a left-sided cardiac implantable electronic device (CIED) in place, which was seen previously and is unchanged in position. The patient has undergone median sternotomy " and CABG (coronary artery bypass graft) surgery. Native coronary artery calcifications are seen. Atherosclerotic changes are present elsewhere. No aneurysmal dilatation of the thoracic aorta is suggested. The central tracheobronchial tree is well aerated without filling defect. Grossly, no enlarged mediastinal lymph nodes are suggested. Chronic calcified granulomatous disease involves the chest. No pneumothorax. A large hiatal hernia is seen, as before. Degenerative changes are seen throughout the imaged spine. There are old (healed) bilateral rib fractures. There may be a chronic vertebral compression fracture at T12, seen previously. Grossly, no acute findings are seen in the partially imaged upper abdomen. There may be anasarca.       Impression: There is suspected pulmonary edema with vascular congestion. Superimposed pneumonia cannot be excluded, especially in the lower lobe of the right lung. There may be chronic interstitial lung disease, as well. There are small-to-moderate bilateral pleural effusions. There is mild cardiomegaly. Native coronary artery calcifications are seen. There is a left-sided CIED. The patient has undergone median sternotomy and CABG (coronary artery bypass graft) surgery. There is suspected anasarca. Please see the above comments for further detail.    Portions of this note were completed with a voice recognition program.  5/22/2025 11:30 PM by Edmar Degroot MD on Workstation: [id number]      XR Chest 1 View  Result Date: 5/22/2025  Narrative: XR CHEST 1 VW Date of Exam: 5/22/2025 6:52 PM EDT Indication: SOA Triage Protocol Comparison: 9/27/2021 Findings: Lines: Unchanged position of left subclavian dual-lead pacemaker. Lungs: Patchy opacities and mild consolidative changes noted within the right lung. There is mild diffuse interstitial thickening, similar in appearance to prior study, nonspecific. Pleura: Possible trace bilateral pleural effusions. No pneumothorax. Possible pleural  thickening noted within the left lung apex Cardiomediastinum: Postsurgical changes noted within the mediastinum. Findings suggestive of a moderate-sized hiatal/paraesophageal hernia. Soft Tissues: Unremarkable. Bones: No acute osseous abnormality.     Impression: Impression: 1.Patchy opacities and mild consolidative changes noted within the right lung, suspicious for pneumonia. 2.Mild diffuse interstitial thickening, similar in appearance to prior study, nonspecific. This may represent interstitial lung disease, emphysema or mild pulmonary vascular congestion 3.Possible trace bilateral pleural effusions. Electronically Signed: Akash Smith DO  5/22/2025 7:06 PM EDT  Workstation ID: [id number]    US Renal Bilateral  Result Date: 4/25/2025  Narrative: US RENAL BILATERAL Date of Exam: 4/24/2025 3:09 PM EDT Indication: CKD 3. Comparison: No comparisons available. Technique: Grayscale and color Doppler ultrasound evaluation of the kidneys and urinary bladder was performed. Findings: The right kidney measures 9.7 cm and the left kidney measures 9.7 cm. Kidney echogenicity and vascularity appear within normal limits. There is no solid kidney mass.  No echogenic shadowing stone.  No hydronephrosis. Round anechoic simple appearing right  renal cyst measures 2.9 x 3.4 x 3 cm. Small round anechoic simple cyst in the left kidney measuring 0.8 x 0.8 x 1 cm Limited visualization of the urinary bladder is unremarkable.     Impression: Impression: 1.No hydronephrosis. 2.Simple appearing bilateral renal cysts. Electronically Signed: Jose Antonio Mitchell MD  4/25/2025 11:19 AM EDT  Workstation ID: [id number]       Medications Reviewed:  !Patient Home Medications Stored in Pharmacy, , Not Applicable, BID  aspirin, 81 mg, Oral, Daily  atorvastatin, 40 mg, Oral, Nightly  cefTRIAXone, 1,000 mg, Intravenous, Q24H  doxycycline, 100 mg, Oral, Q12H  [START ON 5/24/2025] famotidine, 20 mg, Oral, Daily  furosemide, 40 mg, Intravenous,  Q12H  lisinopril, 10 mg, Oral, Q24H  metoprolol succinate XL, 50 mg, Oral, Q24H  sodium chloride, 10 mL, Intravenous, Q12H  warfarin, 1 mg, Oral, Once         Assessment/Plan:      SOB (shortness of breath)    CHF (congestive heart failure)      Medical decision making:    Acute on chronic diastolic heart failure exacerbation:  Continue IV diuretics  Continue ACE inhibitors astral  Continue beta-blockers    Community-acquired pneumonia right sided: On CT of the chest  Continue Rocephin and doxycycline.    CAD status post CABG:  No chest pain  Continue aspirin statins and beta-blockers,.    Atrial fibrillation with controlled ventricular response:  Continue beta-blocker and warfarin    CKD::  Continue to monitor creatinine closely    Hypertension:  Monitor blood pressure closely  Continue home antihypertensives    Sick sinus syndrome status post pacemaker:  Continue to monitor    DVT Prophylaxis:    Warfarin    CODE STATUS:  Full code  Reason for continued hospitalization  and medical necessity :  Patient atrial fibrillation send heart failure with pneumonia requiring further management continue beta-blockers    Orders:  CBC  CMP  Warfarin  DuoNeb    Time spent:  38+ minute not only  including face-to-face rounding putting in the orders writing the note and all the conversation reviewing the records    This transcription was electronically signed.         Disposition: Home with self-care    Diet: Cardiac      Discussed with: Patient in very detail      This has been electronically signed by:  _______________________________    Tr Ybarra MD.DAVID.CPE.FACP.[facility]     At [facility], we believe that sharing information builds trust and better relationships. You are receiving this note because you recently visited [facility]. It is possible you will see health information before a provider has talked with you about it. This kind of information can be easy to misunderstand. To help you fully understand what it means  for your health, we urge you to discuss this note with your provider.           Part of this note may be an electronic transcription/translation of spoken language to printed ,text using the Dragon Dictation System.

## 2025-05-24 ENCOUNTER — READMISSION MANAGEMENT (OUTPATIENT)
Dept: CALL CENTER | Facility: HOSPITAL | Age: OVER 89
End: 2025-05-24
Payer: MEDICARE

## 2025-05-24 VITALS
TEMPERATURE: 97.3 F | BODY MASS INDEX: 22.99 KG/M2 | SYSTOLIC BLOOD PRESSURE: 115 MMHG | WEIGHT: 155.2 LBS | HEART RATE: 65 BPM | DIASTOLIC BLOOD PRESSURE: 55 MMHG | HEIGHT: 69 IN | OXYGEN SATURATION: 98 % | RESPIRATION RATE: 16 BRPM

## 2025-05-24 DIAGNOSIS — E78.2 MIXED HYPERLIPIDEMIA: Primary | ICD-10-CM

## 2025-05-24 LAB
ALBUMIN SERPL-MCNC: 3.3 G/DL (ref 3.5–5.2)
ALBUMIN/GLOB SERPL: 0.8 G/DL
ALP SERPL-CCNC: 64 U/L (ref 39–117)
ALT SERPL W P-5'-P-CCNC: 9 U/L (ref 1–41)
ANION GAP SERPL CALCULATED.3IONS-SCNC: 15 MMOL/L (ref 5–15)
AST SERPL-CCNC: 15 U/L (ref 1–40)
BASOPHILS # BLD AUTO: 0.01 10*3/MM3 (ref 0–0.2)
BASOPHILS NFR BLD AUTO: 0.3 % (ref 0–1.5)
BILIRUB SERPL-MCNC: 0.6 MG/DL (ref 0–1.2)
BUN SERPL-MCNC: 28 MG/DL (ref 8–23)
BUN/CREAT SERPL: 15.6 (ref 7–25)
CALCIUM SPEC-SCNC: 9.8 MG/DL (ref 8.6–10.5)
CHLORIDE SERPL-SCNC: 98 MMOL/L (ref 98–107)
CO2 SERPL-SCNC: 27 MMOL/L (ref 22–29)
CREAT SERPL-MCNC: 1.8 MG/DL (ref 0.76–1.27)
DEPRECATED RDW RBC AUTO: 76.7 FL (ref 37–54)
EGFRCR SERPLBLD CKD-EPI 2021: 35.5 ML/MIN/1.73
EOSINOPHIL # BLD AUTO: 0.19 10*3/MM3 (ref 0–0.4)
EOSINOPHIL NFR BLD AUTO: 5.1 % (ref 0.3–6.2)
ERYTHROCYTE [DISTWIDTH] IN BLOOD BY AUTOMATED COUNT: 19 % (ref 12.3–15.4)
GLOBULIN UR ELPH-MCNC: 4.1 GM/DL
GLUCOSE SERPL-MCNC: 95 MG/DL (ref 65–99)
HCT VFR BLD AUTO: 30.2 % (ref 37.5–51)
HGB BLD-MCNC: 9.4 G/DL (ref 13–17.7)
IMM GRANULOCYTES # BLD AUTO: 0.02 10*3/MM3 (ref 0–0.05)
IMM GRANULOCYTES NFR BLD AUTO: 0.5 % (ref 0–0.5)
INR PPP: 2.31 (ref 0.86–1.15)
LYMPHOCYTES # BLD AUTO: 0.99 10*3/MM3 (ref 0.7–3.1)
LYMPHOCYTES NFR BLD AUTO: 26.5 % (ref 19.6–45.3)
MCH RBC QN AUTO: 34.1 PG (ref 26.6–33)
MCHC RBC AUTO-ENTMCNC: 31.1 G/DL (ref 31.5–35.7)
MCV RBC AUTO: 109.4 FL (ref 79–97)
MONOCYTES # BLD AUTO: 0.38 10*3/MM3 (ref 0.1–0.9)
MONOCYTES NFR BLD AUTO: 10.2 % (ref 5–12)
NEUTROPHILS NFR BLD AUTO: 2.14 10*3/MM3 (ref 1.7–7)
NEUTROPHILS NFR BLD AUTO: 57.4 % (ref 42.7–76)
NRBC BLD AUTO-RTO: 0 /100 WBC (ref 0–0.2)
NT-PROBNP SERPL-MCNC: 4210 PG/ML (ref 0–1800)
PLATELET # BLD AUTO: 178 10*3/MM3 (ref 140–450)
PMV BLD AUTO: 10.4 FL (ref 6–12)
POTASSIUM SERPL-SCNC: 3.3 MMOL/L (ref 3.5–5.2)
PROT SERPL-MCNC: 7.4 G/DL (ref 6–8.5)
PROTHROMBIN TIME: 26.6 SECONDS (ref 11.8–14.9)
RBC # BLD AUTO: 2.76 10*6/MM3 (ref 4.14–5.8)
SODIUM SERPL-SCNC: 140 MMOL/L (ref 136–145)
VIT B12 BLD-MCNC: 441 PG/ML (ref 211–946)
WBC NRBC COR # BLD AUTO: 3.73 10*3/MM3 (ref 3.4–10.8)

## 2025-05-24 PROCEDURE — 94618 PULMONARY STRESS TESTING: CPT

## 2025-05-24 PROCEDURE — 85610 PROTHROMBIN TIME: CPT | Performed by: STUDENT IN AN ORGANIZED HEALTH CARE EDUCATION/TRAINING PROGRAM

## 2025-05-24 PROCEDURE — 25010000002 DOXYCYCLINE 100 MG RECONSTITUTED SOLUTION 1 EACH VIAL: Performed by: INTERNAL MEDICINE

## 2025-05-24 PROCEDURE — 99239 HOSP IP/OBS DSCHRG MGMT >30: CPT | Performed by: INTERNAL MEDICINE

## 2025-05-24 PROCEDURE — 83880 ASSAY OF NATRIURETIC PEPTIDE: CPT | Performed by: INTERNAL MEDICINE

## 2025-05-24 PROCEDURE — 80053 COMPREHEN METABOLIC PANEL: CPT | Performed by: INTERNAL MEDICINE

## 2025-05-24 PROCEDURE — 85025 COMPLETE CBC W/AUTO DIFF WBC: CPT | Performed by: INTERNAL MEDICINE

## 2025-05-24 RX ORDER — POTASSIUM CHLORIDE 750 MG/1
40 CAPSULE, EXTENDED RELEASE ORAL ONCE
Status: COMPLETED | OUTPATIENT
Start: 2025-05-24 | End: 2025-05-24

## 2025-05-24 RX ORDER — POTASSIUM CHLORIDE 1500 MG/1
20 TABLET, EXTENDED RELEASE ORAL DAILY
Qty: 14 TABLET | Refills: 0 | Status: SHIPPED | OUTPATIENT
Start: 2025-05-24 | End: 2025-05-24

## 2025-05-24 RX ORDER — FUROSEMIDE 20 MG/1
20 TABLET ORAL 2 TIMES DAILY
Qty: 60 TABLET | Refills: 0 | Status: SHIPPED | OUTPATIENT
Start: 2025-05-24 | End: 2025-06-23

## 2025-05-24 RX ORDER — POTASSIUM CHLORIDE 1500 MG/1
20 TABLET, EXTENDED RELEASE ORAL DAILY
Qty: 14 TABLET | Refills: 0 | Status: SHIPPED | OUTPATIENT
Start: 2025-05-24 | End: 2025-06-07

## 2025-05-24 RX ORDER — FUROSEMIDE 20 MG/1
20 TABLET ORAL 2 TIMES DAILY
Qty: 60 TABLET | Refills: 0 | Status: SHIPPED | OUTPATIENT
Start: 2025-05-24 | End: 2025-05-24

## 2025-05-24 RX ADMIN — LISINOPRIL 10 MG: 10 TABLET ORAL at 08:56

## 2025-05-24 RX ADMIN — FAMOTIDINE 20 MG: 20 TABLET, FILM COATED ORAL at 08:56

## 2025-05-24 RX ADMIN — METOPROLOL SUCCINATE 50 MG: 50 TABLET, EXTENDED RELEASE ORAL at 08:56

## 2025-05-24 RX ADMIN — POTASSIUM CHLORIDE 40 MEQ: 750 CAPSULE, EXTENDED RELEASE ORAL at 10:53

## 2025-05-24 RX ADMIN — DOXYCYCLINE 100 MG: 100 INJECTION, POWDER, LYOPHILIZED, FOR SOLUTION INTRAVENOUS at 02:08

## 2025-05-24 RX ADMIN — Medication 1 DROP: at 08:55

## 2025-05-24 RX ADMIN — ASPIRIN 81 MG: 81 TABLET, CHEWABLE ORAL at 08:56

## 2025-05-24 NOTE — PLAN OF CARE
Goal Outcome Evaluation:              Outcome Evaluation: alert and oriented x4. vss on RA. no c/o pain/discomfort this shift. antbiotics administered per orders. up adlib in room. no new issues/needs at this time.

## 2025-05-24 NOTE — PROGRESS NOTES
Walking Oximetry Progress Note      Patient Name:  Arvind Coates  YOB: 1935  Date of Procedure: 05/24/25              ROOM AIR BASELINE   SpO2% 95   Heart Rate 58     EXERCISE ON ROOM AIR SpO2% EXERCISE ON O2 LPM SpO2%   1 MINUTE 93 1 MINUTE     2 MINUTES 93 2 MINUTES     3 MINUTES 92 3 MINUTES     4 MINUTES 93 4 MINUTES     5 MINUTES 92 5 MINUTES     6 MINUTES 91 6 MINUTES                Time to Recovery  None    SpO2% Post Exercise  91 on Room air.    HR Post Exercise  64     Comments: Walked with a cane no complaints.           Electronically signed by Hoa Ware RRT, 05/24/25, 10:40 AM EDT.

## 2025-05-24 NOTE — DISCHARGE SUMMARY
[facility]         HOSPITALIST  DISCHARGE SUMMARY    Patient Name: Arvind Coates  : 1935  MRN: [id number]    Date of Admission: 2025  Date of Discharge: 2025  Primary Care Physician: Radha Klein APRN    Consults       Date and Time Order Name Status Description    2025 10:49 PM Hospitalist (on-call MD unless specified)              Active and Resolved Hospital Problems:  Active Hospital Problems    Diagnosis POA    **SOB (shortness of breath) [R06.02] Yes    CHF (congestive heart failure) [I50.9] Yes      Resolved Hospital Problems   No resolved problems to display.   Community-acquired pneumonia due to unknown bacterial source, presumed gram-negative  Acute on chronic diastolic congestive heart failure with acute exacerbation  CAD status post CABG  CKD, unknown stage  Hypertension  Sick sinus syndrome status post pacemaker  Paroxysmal atrial fibrillation on warfarin    Hospital Course     Hospital Course:  Arvind Coates is a 89 y.o. male past medical history of CHF, CKD, hypertension and hyperlipidemia presents to the ED due to shortness of breath. Patient states complaining of shortness of breath for the last couple days with lower extremity edema.  He was found to have evidence of pneumonia as well as CHF exacerbation and was admitted for further care.  He was started on IV antibiotics, IV diuretics with significant improvement of his symptoms.  He was weaned off of oxygen and passed a walk test prior to discharge.  He was transition to oral diuretics with potassium supplementation at discharge.  Transition to oral antibiotics and discharged home in stable condition on 2025.  Recommend follow-up with PCP within 1 week, recommend follow-up with his primary cardiologist Dr. Jackson as previously scheduled within 2 weeks.    Day of Discharge     Vital Signs:  Temp:  [97.3 °F (36.3 °C)-98.1 °F (36.7 °C)] 97.5 °F (36.4 °C)  Heart Rate:  [59-64] 62  Resp:   [18] 18  BP: (112-142)/(53-68) 142/68  Physical Exam:   Gen: NAD, WDWN  ENT: PERRL, EOMI   CV: RRR no MRG  Pulm: CTAB, no w/r/r  GI: Abd soft, NTND, +bs  Neuro: Moving all extremities spontaneously, CN II-XII grossly intact   Psych: A&O*3, normal mood and affect  Skin: No lesions or rashes noted    Discharge Details        Discharge Medications        New Medications        Instructions Start Date   amoxicillin-clavulanate 875-125 MG per tablet  Commonly known as: AUGMENTIN   1 tablet, Oral, 2 Times Daily      furosemide 20 MG tablet  Commonly known as: Lasix   20 mg, Oral, 2 Times Daily      potassium chloride 20 MEQ CR tablet  Commonly known as: KLOR-CON M20   20 mEq, Oral, Daily             Continue These Medications        Instructions Start Date   aspirin 81 MG chewable tablet   Chew 1 tablet Daily.      atorvastatin 40 MG tablet  Commonly known as: LIPITOR   Take 1 tablet by mouth Daily.      ipratropium-albuterol 0.5-2.5 mg/3 ml nebulizer  Commonly known as: DUO-NEB   3 mL, Nebulization, Every 4 Hours PRN      lisinopril 10 MG tablet  Commonly known as: PRINIVIL,ZESTRIL   Take 1 tablet by mouth Daily.      metoprolol succinate XL 50 MG 24 hr tablet  Commonly known as: TOPROL-XL   75 mg, Oral, Daily      pioglitazone 15 MG tablet  Commonly known as: Actos   15 mg, Oral, Daily      Senokot S 8.6-50 MG per tablet  Generic drug: sennosides-docusate   1 tablet, As Needed      warfarin 2 MG tablet  Commonly known as: COUMADIN   2 mg, Oral, 5 Times Weekly, Monday Wednesday Thursday Saturday Sunday       warfarin 2 MG tablet  Commonly known as: COUMADIN   1 mg, Oral, 2 Times Weekly, Tuesday Friday               No Known Allergies    Discharge Disposition:  Home or Self Care    Diet:  Hospital:  Diet Order   Procedures    Diet: Cardiac; Healthy Heart (2-3 Na+); Fluid Consistency: Thin (IDDSI 0)       Discharge Activity:   Activity Instructions       Activity as Tolerated              CODE STATUS:  Code Status and  Medical Interventions: CPR (Attempt to Resuscitate); Full Support   Ordered at: 05/22/25 2314     Code Status (Patient has no pulse and is not breathing):    CPR (Attempt to Resuscitate)     Medical Interventions (Patient has pulse or is breathing):    Full Support         Future Appointments   Date Time Provider Department Center   8/5/2025 12:45 PM Radha Klein APRN [facility]       Additional Instructions for the Follow-ups that You Need to Schedule       Discharge Follow-up with PCP   As directed       Currently Documented PCP:    Radha Klein APRN    PCP Phone Number:    [phone]     Follow Up Details: 3-5 days                Pertinent  and/or Most Recent Results     PROCEDURES:   None    LAB RESULTS:      Lab 05/24/25 0438 05/23/25 0432 05/22/25 2237 05/22/25 2155 05/22/25 1958   WBC 3.73 4.42  --   --  3.86   HEMOGLOBIN 9.4* 9.7*  --   --  9.0*   HEMATOCRIT 30.2* 31.2*  --   --  28.3*   PLATELETS 178 175  --   --  169   NEUTROS ABS 2.14 2.94  --   --  2.31   IMMATURE GRANS (ABS) 0.02 0.01  --   --  0.01   LYMPHS ABS 0.99 0.90  --   --  0.95   MONOS ABS 0.38 0.39  --   --  0.41   EOS ABS 0.19 0.16  --   --  0.16   .4* 109.5*  --   --  110.5*   PROCALCITONIN  --   --   --  0.07  --    LACTATE  --   --  1.1  --   --    PROTIME 26.6* 25.7*  --   --  27.2*         Lab 05/24/25 0438 05/23/25 0432 05/22/25 1958   SODIUM 140 143 141   POTASSIUM 3.3* 3.8 4.1   CHLORIDE 98 102 106   CO2 27.0 23.4 20.8*   ANION GAP 15.0 17.6* 14.2   BUN 28* 28* 26*   CREATININE 1.80* 1.54* 1.54*   EGFR 35.5* 42.9* 42.9*   GLUCOSE 95 125* 122*   CALCIUM 9.8 10.2 9.7         Lab 05/24/25 0438 05/22/25  1958   TOTAL PROTEIN 7.4 7.5   ALBUMIN 3.3* 3.5   GLOBULIN 4.1 4.0   ALT (SGPT) 9 9   AST (SGOT) 15 17   BILIRUBIN 0.6 0.6   ALK PHOS 64 69         Lab 05/24/25  0438 05/23/25  0432 05/22/25 2155 05/22/25 1958   PROBNP 4,210.0*  --   --  6,510.0*   HSTROP T  --  49* 44* 45*   PROTIME 26.6*  25.7*  --  27.2*   INR 2.31* 2.22*  --  2.38*                 Brief Urine Lab Results  (Last result in the past 365 days)        Color   Clarity   Blood   Leuk Est   Nitrite   Protein   CREAT   Urine HCG        01/31/25 1354             157.7               Microbiology Results (last 10 days)       Procedure Component Value - Date/Time    Blood Culture - Blood, Arm, Left [[id number]]  (Normal) Collected: 05/22/25 2237    Lab Status: Preliminary result Specimen: Blood from Arm, Left Updated: 05/23/25 2246     Blood Culture No growth at 24 hours    Narrative:      Less than seven (7) mL's of blood was collected.  Insufficient quantity may yield false negative results.    Blood Culture - Blood, Arm, Right [[id number]]  (Normal) Collected: 05/22/25 2237    Lab Status: Preliminary result Specimen: Blood from Arm, Right Updated: 05/23/25 2246     Blood Culture No growth at 24 hours    Narrative:      Less than seven (7) mL's of blood was collected.  Insufficient quantity may yield false negative results.            FL Video Swallow With Speech Single Contrast  Result Date: 5/23/2025  Impression: No aspiration or penetration. See speech pathology note for additional clinical recommendations. Electronically Signed: Manuel Rosenthal MD  5/23/2025 11:49 AM EDT  Workstation ID: [id number]    CT Chest Without Contrast Diagnostic  Result Date: 5/22/2025  There is suspected pulmonary edema with vascular congestion. Superimposed pneumonia cannot be excluded, especially in the lower lobe of the right lung. There may be chronic interstitial lung disease, as well. There are small-to-moderate bilateral pleural effusions. There is mild cardiomegaly. Native coronary artery calcifications are seen. There is a left-sided CIED. The patient has undergone median sternotomy and CABG (coronary artery bypass graft) surgery. There is suspected anasarca. Please see the above comments for further detail.    Portions of this note were completed with a  voice recognition program.  5/22/2025 11:30 PM by Edmar Degroot MD on Workstation: [id number]      XR Chest 1 View  Result Date: 5/22/2025  Impression: 1.Patchy opacities and mild consolidative changes noted within the right lung, suspicious for pneumonia. 2.Mild diffuse interstitial thickening, similar in appearance to prior study, nonspecific. This may represent interstitial lung disease, emphysema or mild pulmonary vascular congestion 3.Possible trace bilateral pleural effusions. Electronically Signed: Akash Smith DO  5/22/2025 7:06 PM EDT  Workstation ID: [id number]               Results for orders placed during the hospital encounter of 05/22/25    Adult Transthoracic Echo Complete W/ Cont if Necessary Per Protocol    Interpretation Summary    Left ventricular ejection fraction appears to be 56 - 60%.    Left ventricular diastolic function is consistent with (grade I) impaired relaxation.    The left atrial cavity is mild to moderately dilated.    Mild aortic valve stenosis is present.      Labs Pending at Discharge:  Pending Labs       Order Current Status    Blood Culture - Blood, Arm, Left Preliminary result    Blood Culture - Blood, Arm, Right Preliminary result              Time spent on Discharge including face to face service:  34 minutes    Electronically signed by Damian López MD, 05/24/25, 11:49 AM EDT.

## 2025-05-24 NOTE — PROGRESS NOTES
Pharmacy to Dose: Warfarin  Consulting provider: Kenny  Indication for warfarin: Afib  Goal INR range: 2 - 3  Home warfarin dose: warfarin 2 mg PO daily, except warfarin 1 mg PO on Tuesday and Friday.     Date INR Warfarin Dose Given   5/22 2.38 --   5/23 2.22 1 mg   5/24 2.31 2 mg               Any Vitamin K given?: No  Drug interactions Reviewed: Yes. Details:   Ceftriaxone (hypoprothrombinemic effect of Warfarin may be increased)  Acetaminophen (may enhance the anticoagulant effect of Anticoagulants. This appears to be most likely with daily acetaminophen doses exceeding 2 g/day for multiple consecutive days.)  Doxycycline (May enhance the anticoagulant effect - anticipated onset is 2-5 days)  Is patient on bridging therapy with another anticoagulant?: No    Lab Results   Component Value Date     05/24/2025     05/23/2025    HGB 9.4 (L) 05/24/2025    HGB 9.7 (L) 05/23/2025       Plan:  INR reported as 2.31 today.  Will continue with home dosing for now.  INR ordered for tomorrow AM.    Thank you for this consult. Pharmacy will continue to monitor.   Da Fraser

## 2025-05-24 NOTE — OUTREACH NOTE
Prep Survey      Flowsheet Row Responses   [facility] patient discharged from? [facility]   Is LACE score < 7 ? Yes   Eligibility [facility]   Date of Admission 05/22/25   Date of Discharge 05/24/25   Discharge diagnosis CHF (congestive heart failure)   Does the patient have one of the following disease processes/diagnoses(primary or secondary)? CHF   Prep survey completed? Yes            Birgit GUILLAUME - Registered Nurse

## 2025-05-27 ENCOUNTER — TRANSITIONAL CARE MANAGEMENT TELEPHONE ENCOUNTER (OUTPATIENT)
Dept: CALL CENTER | Facility: HOSPITAL | Age: OVER 89
End: 2025-05-27
Payer: MEDICARE

## 2025-05-27 LAB
BACTERIA SPEC AEROBE CULT: NORMAL
BACTERIA SPEC AEROBE CULT: NORMAL

## 2025-05-27 NOTE — OUTREACH NOTE
Call Center TCM Note      Flowsheet Row Responses   [facility] facility patient discharged from? [facility]   Does the patient have one of the following disease processes/diagnoses(primary or secondary)? CHF   TCM attempt successful? No  [VR_ contacts]   Unsuccessful attempts Attempt 1             ORLANDO FENTON - Registered Nurse    5/27/2025, 12:04 EDT

## 2025-05-27 NOTE — OUTREACH NOTE
Call Center TCM Note      Flowsheet Row Responses   [facility], [place], operated by [facility] patient discharged from? [facility]   Does the patient have one of the following disease processes/diagnoses(primary or secondary)? CHF   TCM attempt successful? Yes   Call start time 1553   Call end time 1558   Discharge diagnosis CHF (congestive heart failure)   Meds reviewed with patient/caregiver? Yes   Is the patient having any side effects they believe may be caused by any medication additions or changes? No   Does the patient have all medications ordered at discharge? Yes   Is the patient taking all medications as directed (includes completed medication regime)? Yes   Comments HOSP DC FU appt 6/4/25 1115 am.   Does the patient have an appointment with their PCP within 7-14 days of discharge? Yes   Has home health visited the patient within 72 hours of discharge? N/A   Psychosocial issues? No   Did the patient receive a copy of their discharge instructions? Yes   Nursing interventions Reviewed instructions with patient   What is the patient's perception of their health status since discharge? Improving   Nursing interventions Nurse provided patient education   Is the patient able to teach back signs and symptoms of worsening condition? (i.e. weight gain, shortness of air, etc.) Yes   Is the patient/caregiver able to teach back the hierarchy of who to call/visit for symptoms/problems? PCP, Specialist, Home health nurse, Urgent Care, ED, 911 Yes   TCM call completed? Yes   Wrap up additional comments Pt reports he is doing better. Discussed s/s of HF and when to call his  PCP appt set.   Call end time 1558             ORLANDO FENTON - Registered Nurse    5/27/2025, 15:58 EDT

## 2025-06-03 ENCOUNTER — READMISSION MANAGEMENT (OUTPATIENT)
Dept: CALL CENTER | Facility: HOSPITAL | Age: OVER 89
End: 2025-06-03
Payer: MEDICARE

## 2025-06-03 NOTE — OUTREACH NOTE
CHF Week 2 Survey      Flowsheet Row Responses   [facility] facility patient discharged from? [facility]   Does the patient have one of the following disease processes/diagnoses(primary or secondary)? CHF   Week 2 attempt successful? No   Unsuccessful attempts Attempt 1   Revoke Pat Reyes H - Registered Nurse

## 2025-06-04 ENCOUNTER — OFFICE VISIT (OUTPATIENT)
Dept: FAMILY MEDICINE CLINIC | Facility: CLINIC | Age: OVER 89
End: 2025-06-04
Payer: MEDICARE

## 2025-06-04 VITALS
SYSTOLIC BLOOD PRESSURE: 122 MMHG | HEART RATE: 60 BPM | OXYGEN SATURATION: 96 % | HEIGHT: 69 IN | WEIGHT: 146.7 LBS | DIASTOLIC BLOOD PRESSURE: 54 MMHG | TEMPERATURE: 97.8 F | BODY MASS INDEX: 21.73 KG/M2

## 2025-06-04 DIAGNOSIS — E78.2 MIXED HYPERLIPIDEMIA: ICD-10-CM

## 2025-06-04 DIAGNOSIS — I48.91 ATRIAL FIBRILLATION, UNSPECIFIED TYPE: ICD-10-CM

## 2025-06-04 DIAGNOSIS — K21.9 GASTROESOPHAGEAL REFLUX DISEASE, UNSPECIFIED WHETHER ESOPHAGITIS PRESENT: ICD-10-CM

## 2025-06-04 DIAGNOSIS — N18.32 STAGE 3B CHRONIC KIDNEY DISEASE: ICD-10-CM

## 2025-06-04 DIAGNOSIS — D64.9 ANEMIA, UNSPECIFIED TYPE: ICD-10-CM

## 2025-06-04 DIAGNOSIS — Z09 HOSPITAL DISCHARGE FOLLOW-UP: Primary | ICD-10-CM

## 2025-06-04 DIAGNOSIS — I50.9 CHRONIC CONGESTIVE HEART FAILURE, UNSPECIFIED HEART FAILURE TYPE: ICD-10-CM

## 2025-06-04 LAB
ANION GAP SERPL CALCULATED.3IONS-SCNC: 11.8 MMOL/L (ref 5–15)
BASOPHILS # BLD AUTO: 0.02 10*3/MM3 (ref 0–0.2)
BASOPHILS NFR BLD AUTO: 0.5 % (ref 0–1.5)
BUN SERPL-MCNC: 41 MG/DL (ref 8–23)
BUN/CREAT SERPL: 19.4 (ref 7–25)
CALCIUM SPEC-SCNC: 10.4 MG/DL (ref 8.6–10.5)
CHLORIDE SERPL-SCNC: 104 MMOL/L (ref 98–107)
CO2 SERPL-SCNC: 25.2 MMOL/L (ref 22–29)
CREAT SERPL-MCNC: 2.11 MG/DL (ref 0.76–1.27)
DEPRECATED RDW RBC AUTO: 55.8 FL (ref 37–54)
EGFRCR SERPLBLD CKD-EPI 2021: 29.4 ML/MIN/1.73
EOSINOPHIL # BLD AUTO: 0.26 10*3/MM3 (ref 0–0.4)
EOSINOPHIL NFR BLD AUTO: 6.2 % (ref 0.3–6.2)
ERYTHROCYTE [DISTWIDTH] IN BLOOD BY AUTOMATED COUNT: 14.5 % (ref 12.3–15.4)
FOLATE SERPL-MCNC: 10.9 NG/ML (ref 4.78–24.2)
GLUCOSE SERPL-MCNC: 78 MG/DL (ref 65–99)
HCT VFR BLD AUTO: 25.1 % (ref 37.5–51)
HGB BLD-MCNC: 8.3 G/DL (ref 13–17.7)
IMM GRANULOCYTES # BLD AUTO: 0.01 10*3/MM3 (ref 0–0.05)
IMM GRANULOCYTES NFR BLD AUTO: 0.2 % (ref 0–0.5)
LYMPHOCYTES # BLD AUTO: 1.24 10*3/MM3 (ref 0.7–3.1)
LYMPHOCYTES NFR BLD AUTO: 29.4 % (ref 19.6–45.3)
MCH RBC QN AUTO: 34.7 PG (ref 26.6–33)
MCHC RBC AUTO-ENTMCNC: 33.1 G/DL (ref 31.5–35.7)
MCV RBC AUTO: 105 FL (ref 79–97)
MONOCYTES # BLD AUTO: 0.44 10*3/MM3 (ref 0.1–0.9)
MONOCYTES NFR BLD AUTO: 10.4 % (ref 5–12)
NEUTROPHILS NFR BLD AUTO: 2.25 10*3/MM3 (ref 1.7–7)
NEUTROPHILS NFR BLD AUTO: 53.3 % (ref 42.7–76)
NRBC BLD AUTO-RTO: 0 /100 WBC (ref 0–0.2)
NT-PROBNP SERPL-MCNC: 4365 PG/ML (ref 0–1800)
PLATELET # BLD AUTO: 179 10*3/MM3 (ref 140–450)
PMV BLD AUTO: 10.4 FL (ref 6–12)
POTASSIUM SERPL-SCNC: 4.2 MMOL/L (ref 3.5–5.2)
QT INTERVAL: 505 MS
QTC INTERVAL: 508 MS
RBC # BLD AUTO: 2.39 10*6/MM3 (ref 4.14–5.8)
SODIUM SERPL-SCNC: 141 MMOL/L (ref 136–145)
WBC NRBC COR # BLD AUTO: 4.22 10*3/MM3 (ref 3.4–10.8)

## 2025-06-04 PROCEDURE — 82746 ASSAY OF FOLIC ACID SERUM: CPT

## 2025-06-04 PROCEDURE — 83880 ASSAY OF NATRIURETIC PEPTIDE: CPT

## 2025-06-04 PROCEDURE — 85025 COMPLETE CBC W/AUTO DIFF WBC: CPT

## 2025-06-04 PROCEDURE — 80048 BASIC METABOLIC PNL TOTAL CA: CPT

## 2025-06-04 RX ORDER — MECOBALAMIN 5000 MCG
15 TABLET,DISINTEGRATING ORAL DAILY
Qty: 30 CAPSULE | Refills: 2 | Status: SHIPPED | OUTPATIENT
Start: 2025-06-04

## 2025-06-04 NOTE — PROGRESS NOTES
Arvind Coates is a 89 y.o. male admitted to [facility] and  is seen for follow up.  Chief Complaint   Patient presents with    Hospital Follow Up Visit     Admit: 5/22/58; Discharge: 5/24/25; Dx: Congestive heart failure, pneumonia    Leg Swelling       History of Present Illness  The patient presents for evaluation of leg edema, indigestion, and congestive heart failure.    He reports an improvement in his overall health status, with a particular emphasis on enhanced respiratory function. He was hospitalized on 05/22/2025 due to shortness of breath, during which he experienced significant leg swelling, a symptom he had not previously encountered. The swelling has since subsided but remains slightly present. He describes a sensation akin to a knot in his leg, which is associated with soreness but does not impede his ability to walk. He has not been utilizing compression socks provided during his hospital stay.    He has been adhering to a regimen of Lasix twice daily and potassium once daily. He occasionally uses oxygen at home, although it tends to induce nosebleeds. He recalls that a physician had temporarily discontinued one of his medications to assess its necessity, which resulted in difficulty digesting food and water. He believes this medication was beneficial.     He maintains a good appetite and does not report any weakness. He has a scheduled appointment with his cardiologist tomorrow. He is under the care of Dr. Jackson for his Coumadin management.  He does report some tremors that he has noticed worsening recently.          5/24/2025     2:23 PM   Date of TCM Phone Call   [facility]   Date of Admission 5/22/2025   Date of Discharge 5/24/2025     Discharge summary is available.  Current outpatient and discharge medications have been reconciled for the patient.  Reviewed by: OLIVIA Nazario      He was admitted for the following reason(s): Shortness of breath,  edema  Primary admitting diagnosis at discharge was CHF, shortness of breath.  Pertinent secondary diagnoses include community-acquired pneumonia, CAD status post CABG, CKD, hypertension, sick sinus syndrome status post pacemaker, paroxysmal atrial fibrillation on warfarin.  Procedures performed while hospitalized:Procedures Performed in Hospital: please see EPIC record for further details of results and finding  Pending results:  Pending tests: none  Pain Control:  well controlled  Diet: Low-sodium, heart healthy  Activity after Discharge: activity as tolerated  Items to be addressed at first follow up visit include:  1. Medication changes:     New Medications         Instructions Start Date   amoxicillin-clavulanate 875-125 MG per tablet  Commonly known as: AUGMENTIN    1 tablet, Oral, 2 Times Daily        furosemide 20 MG tablet  Commonly known as: Lasix    20 mg, Oral, 2 Times Daily        potassium chloride 20 MEQ CR tablet  Commonly known as: KLOR-CON M20    20 mEq, Oral, Daily                  Continue These Medications         Instructions Start Date   aspirin 81 MG chewable tablet    Chew 1 tablet Daily.        atorvastatin 40 MG tablet  Commonly known as: LIPITOR    Take 1 tablet by mouth Daily.        ipratropium-albuterol 0.5-2.5 mg/3 ml nebulizer  Commonly known as: DUO-NEB    3 mL, Nebulization, Every 4 Hours PRN        lisinopril 10 MG tablet  Commonly known as: PRINIVIL,ZESTRIL    Take 1 tablet by mouth Daily.        metoprolol succinate XL 50 MG 24 hr tablet  Commonly known as: TOPROL-XL    75 mg, Oral, Daily        pioglitazone 15 MG tablet  Commonly known as: Actos    15 mg, Oral, Daily        Senokot S 8.6-50 MG per tablet  Generic drug: sennosides-docusate    1 tablet, As Needed        warfarin 2 MG tablet  Commonly known as: COUMADIN    2 mg, Oral, 5 Times Weekly, Monday Wednesday Thursday Saturday Sunday         warfarin 2 MG tablet  Commonly known as: COUMADIN    1 mg, Oral, 2 Times  "Weekly,             He reports his overall condition is are improving since discharge.  No specific complaints.  Social support is said to be adequate to meet his needs.       Objective   Vitals:    25 1130   BP: 122/54   BP Location: Left arm   Patient Position: Sitting   Cuff Size: Adult   Pulse: 60   Temp: 97.8 °F (36.6 °C)   TempSrc: Oral   SpO2: 96%   Weight: 66.5 kg (146 lb 11.2 oz)   Height: 175.3 cm (69\")     Body mass index is 21.66 kg/m².  Physical Exam  Vitals reviewed.   Constitutional:       Appearance: Normal appearance. He is well-developed.   HENT:      Head: Normocephalic and atraumatic.   Eyes:      Conjunctiva/sclera: Conjunctivae normal.      Pupils: Pupils are equal, round, and reactive to light.   Cardiovascular:      Rate and Rhythm: Normal rate and regular rhythm.      Heart sounds: Murmur heard.      No friction rub. No gallop.   Pulmonary:      Effort: Pulmonary effort is normal. No tachypnea or respiratory distress.      Breath sounds: Normal breath sounds. No wheezing or rhonchi.   Abdominal:      General: Bowel sounds are normal. There is no distension.      Palpations: Abdomen is soft.      Tenderness: There is no abdominal tenderness.   Musculoskeletal:      Right lower le+ Pitting Edema present.      Left lower le+ Pitting Edema present.   Skin:     General: Skin is warm and dry.   Neurological:      Mental Status: He is alert and oriented to person, place, and time.      Cranial Nerves: No cranial nerve deficit, dysarthria or facial asymmetry.      Motor: Weakness and tremor present.   Psychiatric:         Mood and Affect: Mood and affect normal.         Behavior: Behavior normal.         Thought Content: Thought content normal.         Judgment: Judgment normal.       Physical Exam  Respiratory: Clear to auscultation, no wheezing, rales or rhonchi  Extremities: Mild edema in the legs, left leg more swollen than right, pitting edema present    Results     "     Assessment & Plan   Problem List Items Addressed This Visit       Atrial fibrillation    CHF (congestive heart failure)    Relevant Orders    proBNP    Hyperlipidemia     Other Visit Diagnoses         Hospital discharge follow-up    -  Primary      Gastroesophageal reflux disease, unspecified whether esophagitis present        Relevant Medications    lansoprazole (Prevacid) 15 MG capsule      Stage 3b chronic kidney disease        Relevant Orders    Basic metabolic panel      Anemia, unspecified type              Assessment & Plan  1. Leg edema.  - The patient's leg edema is likely due to suboptimal cardiac function, leading to fluid accumulation in the lungs and poor circulation.  - Physical examination reveals persistent swelling, with the left leg more affected than the right, and pitting edema noted.  - He has been advised to wear compression socks upon waking each morning to manage the swelling and improve circulation. A low-salt diet, not exceeding 2 g per day, has been recommended.  - He should continue his current medication regimen, including Lasix (furosemide) twice a day and potassium once a day, until further notice. A kidney function test will be conducted today to ensure that the Lasix has not adversely affected his kidneys. He should consult with Dr. Jackson regarding the potential modification of his Lasix dosage.    2. Indigestion.  - The patient has been experiencing indigestion, which may be related to his previous medication regimen.  - Lansoprazole has been prescribed as an alternative to omeprazole, which is safer for his kidneys.  - He should take lansoprazole as directed and monitor for any improvement in symptoms. If symptoms persist, he should contact the office for further evaluation and potential adjustment of treatment.  - The patient reported that omeprazole was discontinued by nephrology, leading to worsening symptoms. Lansoprazole is expected to alleviate these symptoms and should  be less nephrotoxic.    3. Congestive heart failure.  - The patient's congestive heart failure appears to be improving with the current medication regimen.  - He should continue taking Lasix (furosemide) twice a day and potassium once a day as prescribed.  - He has a follow-up appointment with his cardiologist, Dr. Jackson, tomorrow to assess his condition and discuss any potential changes to his treatment plan.  - Monitoring of kidney function and potassium levels will be conducted today to ensure no adverse effects from the diuretic therapy.    Follow-up  - The patient will follow up in 6 weeks.    Patient or patient representative verbalized consent for the use of Ambient Listening during the visit with  OLIVIA Nazario for chart documentation. 6/4/2025  12:11 EDT

## 2025-07-15 ENCOUNTER — HOSPITAL ENCOUNTER (OUTPATIENT)
Facility: HOSPITAL | Age: OVER 89
Discharge: HOME | End: 2025-07-15
Payer: MEDICARE

## 2025-07-15 ENCOUNTER — OFFICE VISIT (OUTPATIENT)
Dept: ONCOLOGY | Facility: HOSPITAL | Age: OVER 89
End: 2025-07-15
Payer: MEDICARE

## 2025-07-15 ENCOUNTER — LAB (OUTPATIENT)
Dept: ONCOLOGY | Facility: HOSPITAL | Age: OVER 89
End: 2025-07-15
Payer: MEDICARE

## 2025-07-15 VITALS
HEART RATE: 59 BPM | HEIGHT: 69 IN | BODY MASS INDEX: 22.27 KG/M2 | RESPIRATION RATE: 18 BRPM | SYSTOLIC BLOOD PRESSURE: 135 MMHG | WEIGHT: 150.35 LBS | DIASTOLIC BLOOD PRESSURE: 62 MMHG | TEMPERATURE: 97.3 F | OXYGEN SATURATION: 98 %

## 2025-07-15 DIAGNOSIS — D47.2 MONOCLONAL GAMMOPATHY: ICD-10-CM

## 2025-07-15 DIAGNOSIS — D47.2 MONOCLONAL GAMMOPATHY: Primary | ICD-10-CM

## 2025-07-15 DIAGNOSIS — D64.9 ANEMIA, UNSPECIFIED TYPE: ICD-10-CM

## 2025-07-15 LAB
ALBUMIN SERPL-MCNC: 3.5 G/DL (ref 3.5–5.2)
ALBUMIN/GLOB SERPL: 0.7 G/DL
ALP SERPL-CCNC: 64 U/L (ref 39–117)
ALT SERPL W P-5'-P-CCNC: 8 U/L (ref 1–41)
ANION GAP SERPL CALCULATED.3IONS-SCNC: 14.2 MMOL/L (ref 5–15)
ANISOCYTOSIS BLD QL: NORMAL
AST SERPL-CCNC: 20 U/L (ref 1–40)
BASOPHILS # BLD AUTO: 0.01 10*3/MM3 (ref 0–0.2)
BASOPHILS NFR BLD AUTO: 0.3 % (ref 0–1.5)
BILIRUB SERPL-MCNC: 0.5 MG/DL (ref 0–1.2)
BUN SERPL-MCNC: 30.9 MG/DL (ref 8–23)
BUN/CREAT SERPL: 16 (ref 7–25)
CALCIUM SPEC-SCNC: 10.3 MG/DL (ref 8.6–10.5)
CHLORIDE SERPL-SCNC: 105 MMOL/L (ref 98–107)
CO2 SERPL-SCNC: 21.8 MMOL/L (ref 22–29)
CREAT SERPL-MCNC: 1.93 MG/DL (ref 0.76–1.27)
DEPRECATED RDW RBC AUTO: 61.9 FL (ref 37–54)
EGFRCR SERPLBLD CKD-EPI 2021: 32.7 ML/MIN/1.73
EOSINOPHIL # BLD AUTO: 0.19 10*3/MM3 (ref 0–0.4)
EOSINOPHIL NFR BLD AUTO: 5.7 % (ref 0.3–6.2)
ERYTHROCYTE [DISTWIDTH] IN BLOOD BY AUTOMATED COUNT: 15.9 % (ref 12.3–15.4)
GLOBULIN UR ELPH-MCNC: 5 GM/DL
GLUCOSE SERPL-MCNC: 129 MG/DL (ref 65–99)
HCT VFR BLD AUTO: 24.2 % (ref 37.5–51)
HGB BLD-MCNC: 7.9 G/DL (ref 13–17.7)
IMM GRANULOCYTES # BLD AUTO: 0 10*3/MM3 (ref 0–0.05)
IMM GRANULOCYTES NFR BLD AUTO: 0 % (ref 0–0.5)
LYMPHOCYTES # BLD AUTO: 0.91 10*3/MM3 (ref 0.7–3.1)
LYMPHOCYTES NFR BLD AUTO: 27.2 % (ref 19.6–45.3)
MACROCYTES BLD QL SMEAR: NORMAL
MCH RBC QN AUTO: 35.1 PG (ref 26.6–33)
MCHC RBC AUTO-ENTMCNC: 32.6 G/DL (ref 31.5–35.7)
MCV RBC AUTO: 107.6 FL (ref 79–97)
MONOCYTES # BLD AUTO: 0.29 10*3/MM3 (ref 0.1–0.9)
MONOCYTES NFR BLD AUTO: 8.7 % (ref 5–12)
NEUTROPHILS NFR BLD AUTO: 1.94 10*3/MM3 (ref 1.7–7)
NEUTROPHILS NFR BLD AUTO: 58.1 % (ref 42.7–76)
NRBC BLD AUTO-RTO: 0 /100 WBC (ref 0–0.2)
PLATELET # BLD AUTO: 130 10*3/MM3 (ref 140–450)
PMV BLD AUTO: 10.3 FL (ref 6–12)
POTASSIUM SERPL-SCNC: 3.7 MMOL/L (ref 3.5–5.2)
PROT SERPL-MCNC: 8.5 G/DL (ref 6–8.5)
RBC # BLD AUTO: 2.25 10*6/MM3 (ref 4.14–5.8)
SMALL PLATELETS BLD QL SMEAR: NORMAL
SODIUM SERPL-SCNC: 141 MMOL/L (ref 136–145)
WBC MORPH BLD: NORMAL
WBC NRBC COR # BLD AUTO: 3.34 10*3/MM3 (ref 3.4–10.8)

## 2025-07-15 PROCEDURE — 85025 COMPLETE CBC W/AUTO DIFF WBC: CPT

## 2025-07-15 PROCEDURE — 82784 ASSAY IGA/IGD/IGG/IGM EACH: CPT

## 2025-07-15 PROCEDURE — 36415 COLL VENOUS BLD VENIPUNCTURE: CPT

## 2025-07-15 PROCEDURE — 84156 ASSAY OF PROTEIN URINE: CPT

## 2025-07-15 PROCEDURE — 80053 COMPREHEN METABOLIC PANEL: CPT

## 2025-07-15 PROCEDURE — 83521 IG LIGHT CHAINS FREE EACH: CPT

## 2025-07-15 PROCEDURE — G0463 HOSPITAL OUTPT CLINIC VISIT: HCPCS | Performed by: INTERNAL MEDICINE

## 2025-07-15 PROCEDURE — 77075 RADEX OSSEOUS SURVEY COMPL: CPT

## 2025-07-15 PROCEDURE — 84165 PROTEIN E-PHORESIS SERUM: CPT

## 2025-07-15 PROCEDURE — 86334 IMMUNOFIX E-PHORESIS SERUM: CPT

## 2025-07-15 PROCEDURE — 85007 BL SMEAR W/DIFF WBC COUNT: CPT

## 2025-07-15 PROCEDURE — 84166 PROTEIN E-PHORESIS/URINE/CSF: CPT

## 2025-07-15 NOTE — PROGRESS NOTES
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  He has not been seen in clinic for more than a year.  He states he has been in his usual health.  His energy level is low but adequate for his ADLs.  He notes easy bruising but he is on Coumadin.  He denies obvious blood loss.  He notes normal appetite.  He denies masses, adenopathy, unusual aches or pains.    Oncology/Hematology History    No history exists.         Current Outpatient Medications on File Prior to Visit   Medication Sig Dispense Refill    aspirin 81 MG chewable tablet Chew 1 tablet Daily.      atorvastatin (LIPITOR) 40 MG tablet Take 1 tablet by mouth Daily.      furosemide (Lasix) 20 MG tablet Take 1 tablet by mouth 2 (Two) Times a Day for 30 days. 60 tablet 0    ipratropium-albuterol (DUO-NEB) 0.5-2.5 mg/3 ml nebulizer Take 3 mL by nebulization Every 4 (Four) Hours As Needed for Wheezing.      lansoprazole (Prevacid) 15 MG capsule Take 1 capsule by mouth Daily. 30 capsule 2    lisinopril (PRINIVIL,ZESTRIL) 10 MG tablet Take 1 tablet by mouth Daily.      metoprolol succinate XL (TOPROL-XL) 50 MG 24 hr tablet Take 1.5 tablets by mouth Daily.      pioglitazone (Actos) 15 MG tablet Take 1 tablet by mouth Daily. 90 tablet 1    sennosides-docusate (Senokot S) 8.6-50 MG per tablet Take 1 tablet by mouth As Needed for Constipation.      warfarin (COUMADIN) 2 MG tablet Take 1 tablet by mouth 5 (Five) Times a Week. Monday Wednesday Thursday Saturday Sunday      warfarin (COUMADIN) 2 MG tablet Take 0.5 tablets by mouth 2 (Two) Times a Week. Tuesday Friday       No current facility-administered medications on file prior to visit.       No Known Allergies  Past Medical History:   Diagnosis Date    Anemia     Bleeding     Bleeding issues post 1  colonoscopy and during 1 prep- caused  to pass out    Broken bones     CHF (congestive heart failure)     Chronic kidney disease     COVID-19     3/2021    Hemorrhoids     Hyperlipidemia     Hypertension     Pneumonia     Pre-diabetes     Shortness of breath      Past Surgical History:   Procedure Laterality Date    APPENDECTOMY      BONE MARROW BIOPSY      COLONOSCOPY      2012?- normal per patient    COLONOSCOPY N/A 08/10/2022    Procedure: COLONOSCOPY WITH ORISE INJECTION/POLYPECTOMY /SNARE/CLIP APPLICATION X9;  Surgeon: Steven Holm MD;  Location: [facility] ENDOSCOPY;  Service: Gastroenterology;  Laterality: N/A;  COLON POLYPS  DIVERTICULOSIS    CORONARY ARTERY BYPASS GRAFT      triple    ENDOSCOPY N/A 08/10/2022    Procedure: ESOPHAGOGASTRODUODENOSCOPY WITH BIOPSIES;  Surgeon: Steven Holm MD;  Location: [facility] ENDOSCOPY;  Service: Gastroenterology;  Laterality: N/A;  HIATAL HERNIA  ERIK ULCER    HEMORRHOIDECTOMY      UPPER GASTROINTESTINAL ENDOSCOPY       Social History     Socioeconomic History    Marital status: Single   Tobacco Use    Smoking status: Never     Passive exposure: Yes    Smokeless tobacco: Never   Vaping Use    Vaping status: Never Used   Substance and Sexual Activity    Alcohol use: Never    Drug use: Never    Sexual activity: Defer     Family History   Problem Relation Age of Onset    Colon cancer Brother 55    Prostate cancer Brother     Diabetes Other     Malig Hyperthermia Neg Hx        Objective   Physical Exam  Vitals reviewed. Exam conducted with a chaperone present.   Cardiovascular:      Rate and Rhythm: Normal rate and regular rhythm.      Heart sounds: Normal heart sounds. No murmur heard.     No gallop.   Pulmonary:      Effort: Pulmonary effort is normal.      Breath sounds: Normal breath sounds.   Abdominal:      General: Bowel sounds are normal.   Lymphadenopathy:      Cervical: No cervical adenopathy.   Psychiatric:         Mood and Affect: Mood normal.         Behavior: Behavior normal.  "        Vitals:    07/15/25 0915   BP: 135/62   Pulse: 59   Resp: 18   Temp: 97.3 °F (36.3 °C)   TempSrc: Temporal   SpO2: 98%   Weight: 68.2 kg (150 lb 5.7 oz)   Height: 175.3 cm (69.02\")   PainSc: 0-No pain     ECOG score: 0         PHQ-9 Total Score:                    Result Review :   The following data was reviewed by: Molina Shen MD on 07/15/2025:  Lab Results   Component Value Date    HGB 7.9 (C) 07/15/2025    HCT 24.2 (L) 07/15/2025    .6 (H) 07/15/2025     (L) 07/15/2025    WBC 3.34 (L) 07/15/2025    NEUTROABS 1.94 07/15/2025    LYMPHSABS 0.91 07/15/2025    MONOSABS 0.29 07/15/2025    EOSABS 0.19 07/15/2025    BASOSABS 0.01 07/15/2025     Lab Results   Component Value Date    GLUCOSE 129 (H) 07/15/2025    BUN 30.9 (H) 07/15/2025    CREATININE 1.93 (H) 07/15/2025     07/15/2025    K 3.7 07/15/2025     07/15/2025    CO2 21.8 (L) 07/15/2025    CALCIUM 10.3 07/15/2025    PROTEINTOT 8.5 07/15/2025    ALBUMIN 3.5 07/15/2025    BILITOT 0.5 07/15/2025    ALKPHOS 64 07/15/2025    AST 20 07/15/2025    ALT 8 07/15/2025     Lab Results   Component Value Date    MG 2.16 04/03/2021    PHOS 3.6 03/25/2021    TSH 2.830 10/08/2021     Lab Results   Component Value Date    IRON 56 (L) 03/10/2025    LABIRON 21 03/10/2025    TRANSFERRIN 176 (L) 03/10/2025    TIBC 262 (L) 03/10/2025     Lab Results   Component Value Date     10/19/2022    FERRITIN 130.00 03/10/2025    XEMEBUFO05 441 05/22/2025    FOLATE 10.90 06/04/2025     Lab Results   Component Value Date    PSA 9.250 (H) 10/25/2023             Assessment and Plan    Diagnoses and all orders for this visit:    1. Monoclonal gammopathy (Primary)  Assessment & Plan:  Patient has been absent from clinic for over a year.  Prior to that, his M spike had been low and stable.  I will plan repeat testing including skeletal survey, SPEP/ROBERTO, UPEP/ROBERTO and free light chain assay.  Assuming stability, I would continue with surveillance and would " plan for yearly follow-up visits.  This office will call with results.    Orders:  -     CBC & Differential; Future  -     Comprehensive Metabolic Panel; Future  -     Immunofixation (ROBERTO), Protein Electrophoresis (PE), and Quantitative Free Kappa and Lambda Light Chains (FLC), Serum; Future  -     Immunoglobulin Free LT Chains Blood; Future  -     XR Bone Survey Complete; Future  -     Protein Electrophoresis, Random Urine - Urine, Clean Catch; Future  -     CBC & Differential; Future  -     Comprehensive Metabolic Panel; Future  -     Immunofixation (ROBERTO), Protein Electrophoresis (PE), and Quantitative Free Kappa and Lambda Light Chains (FLC), Serum; Future  -     Immunoglobulin Free LT Chains Blood; Future  -     Protein Electrophoresis, Random Urine - Urine, Clean Catch; Future    2. Anemia, unspecified type  Assessment & Plan:  Hemoglobin has been trending down.  I will check stool for occult blood.    Orders:  -     Occult Blood, Fecal By Immunoassay - Stool, Per Rectum; Future            Patient Follow Up: 1 year    Patient was given instructions and counseling regarding his condition or for health maintenance advice. Please see specific information pulled into the AVS if appropriate.     Molina Shen MD    7/16/2025

## 2025-07-16 PROBLEM — D64.9 ANEMIA: Status: ACTIVE | Noted: 2022-04-21

## 2025-07-16 NOTE — ASSESSMENT & PLAN NOTE
Patient has been absent from clinic for over a year.  Prior to that, his M spike had been low and stable.  I will plan repeat testing including skeletal survey, SPEP/ROBERTO, UPEP/ROBERTO and free light chain assay.  Assuming stability, I would continue with surveillance and would plan for yearly follow-up visits.  This office will call with results.

## 2025-07-17 LAB
ALBUMIN MFR UR ELPH: 23.2 %
ALBUMIN SERPL ELPH-MCNC: 3.3 G/DL (ref 2.9–4.4)
ALBUMIN/GLOB SERPL: 0.8 {RATIO} (ref 0.7–1.7)
ALPHA1 GLOB MFR UR ELPH: 0.6 %
ALPHA1 GLOB SERPL ELPH-MCNC: 0.2 G/DL (ref 0–0.4)
ALPHA2 GLOB MFR UR ELPH: 6.4 %
ALPHA2 GLOB SERPL ELPH-MCNC: 0.8 G/DL (ref 0.4–1)
B-GLOBULIN MFR UR ELPH: 64.1 %
B-GLOBULIN SERPL ELPH-MCNC: 3 G/DL (ref 0.7–1.3)
GAMMA GLOB MFR UR ELPH: 5.6 %
GAMMA GLOB SERPL ELPH-MCNC: 0.4 G/DL (ref 0.4–1.8)
GLOBULIN SER-MCNC: 4.4 G/DL (ref 2.2–3.9)
IGA SERPL-MCNC: 1846 MG/DL (ref 61–437)
IGG SERPL-MCNC: 478 MG/DL (ref 603–1613)
IGM SERPL-MCNC: 22 MG/DL (ref 15–143)
INTERPRETATION SERPL IEP-IMP: ABNORMAL
KAPPA LC FREE SER-MCNC: 2367 MG/L (ref 3.3–19.4)
KAPPA LC FREE/LAMBDA FREE SER: 174.04 {RATIO} (ref 0.26–1.65)
LABORATORY COMMENT REPORT: ABNORMAL
LABORATORY COMMENT REPORT: ABNORMAL
LAMBDA LC FREE SERPL-MCNC: 13.6 MG/L (ref 5.7–26.3)
M PROTEIN MFR UR ELPH: 61.1 %
M PROTEIN SERPL ELPH-MCNC: 1.7 G/DL
PROT SERPL-MCNC: 7.7 G/DL (ref 6–8.5)
PROT UR-MCNC: 405.4 MG/DL

## 2025-07-23 DIAGNOSIS — R60.0 EDEMA OF BOTH LEGS: ICD-10-CM

## 2025-07-23 DIAGNOSIS — N18.32 STAGE 3B CHRONIC KIDNEY DISEASE: ICD-10-CM

## 2025-07-23 DIAGNOSIS — I50.9 CHRONIC CONGESTIVE HEART FAILURE, UNSPECIFIED HEART FAILURE TYPE: Primary | ICD-10-CM

## 2025-07-23 RX ORDER — POTASSIUM CHLORIDE 750 MG/1
10 TABLET, EXTENDED RELEASE ORAL 2 TIMES DAILY
Qty: 30 TABLET | Refills: 1 | Status: SHIPPED | OUTPATIENT
Start: 2025-07-23

## 2025-07-23 RX ORDER — FUROSEMIDE 20 MG/1
20 TABLET ORAL DAILY
Qty: 30 TABLET | Refills: 1 | Status: SHIPPED | OUTPATIENT
Start: 2025-07-23

## 2025-07-25 ENCOUNTER — OFFICE VISIT (OUTPATIENT)
Dept: FAMILY MEDICINE CLINIC | Facility: CLINIC | Age: OVER 89
End: 2025-07-25
Payer: MEDICARE

## 2025-07-25 VITALS
HEART RATE: 59 BPM | TEMPERATURE: 97.9 F | OXYGEN SATURATION: 96 % | BODY MASS INDEX: 22.96 KG/M2 | SYSTOLIC BLOOD PRESSURE: 138 MMHG | WEIGHT: 155 LBS | DIASTOLIC BLOOD PRESSURE: 68 MMHG | HEIGHT: 69 IN

## 2025-07-25 DIAGNOSIS — N18.32 STAGE 3B CHRONIC KIDNEY DISEASE: ICD-10-CM

## 2025-07-25 DIAGNOSIS — R60.0 EDEMA OF BOTH LEGS: ICD-10-CM

## 2025-07-25 DIAGNOSIS — I48.91 ATRIAL FIBRILLATION, UNSPECIFIED TYPE: ICD-10-CM

## 2025-07-25 DIAGNOSIS — J43.9 PULMONARY EMPHYSEMA, UNSPECIFIED EMPHYSEMA TYPE: ICD-10-CM
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
## 2025-07-25 DIAGNOSIS — I10 PRIMARY HYPERTENSION: ICD-10-CM

## 2025-07-25 DIAGNOSIS — S40.869A TICK BITE OF UPPER ARM, UNSPECIFIED LATERALITY, INITIAL ENCOUNTER: ICD-10-CM

## 2025-07-25 DIAGNOSIS — N18.31 TYPE 2 DIABETES MELLITUS WITH STAGE 3A CHRONIC KIDNEY DISEASE, WITHOUT LONG-TERM CURRENT USE OF INSULIN: ICD-10-CM

## 2025-07-25 DIAGNOSIS — D64.9 ANEMIA, UNSPECIFIED TYPE: ICD-10-CM

## 2025-07-25 DIAGNOSIS — E11.22 TYPE 2 DIABETES MELLITUS WITH STAGE 3A CHRONIC KIDNEY DISEASE, WITHOUT LONG-TERM CURRENT USE OF INSULIN: ICD-10-CM

## 2025-07-25 DIAGNOSIS — E78.2 MIXED HYPERLIPIDEMIA: ICD-10-CM

## 2025-07-25 DIAGNOSIS — I50.9 CHRONIC CONGESTIVE HEART FAILURE, UNSPECIFIED HEART FAILURE TYPE: Primary | ICD-10-CM

## 2025-07-25 LAB
ALBUMIN SERPL-MCNC: 3.4 G/DL (ref 3.5–5.2)
ALBUMIN/GLOB SERPL: 0.7 G/DL
ALP SERPL-CCNC: 65 U/L (ref 39–117)
ALT SERPL W P-5'-P-CCNC: 21 U/L (ref 1–41)
ANION GAP SERPL CALCULATED.3IONS-SCNC: 17 MMOL/L (ref 5–15)
AST SERPL-CCNC: 39 U/L (ref 1–40)
BASOPHILS # BLD AUTO: 0.01 10*3/MM3 (ref 0–0.2)
BASOPHILS NFR BLD AUTO: 0.3 % (ref 0–1.5)
BILIRUB SERPL-MCNC: 0.6 MG/DL (ref 0–1.2)
BUN SERPL-MCNC: 36 MG/DL (ref 8–23)
BUN/CREAT SERPL: 16.7 (ref 7–25)
CALCIUM SPEC-SCNC: 10.2 MG/DL (ref 8.6–10.5)
CHLORIDE SERPL-SCNC: 106 MMOL/L (ref 98–107)
CHOLEST SERPL-MCNC: 95 MG/DL (ref 0–200)
CO2 SERPL-SCNC: 20 MMOL/L (ref 22–29)
CREAT SERPL-MCNC: 2.15 MG/DL (ref 0.76–1.27)
DEPRECATED RDW RBC AUTO: 55.1 FL (ref 37–54)
EGFRCR SERPLBLD CKD-EPI 2021: 28.7 ML/MIN/1.73
EOSINOPHIL # BLD AUTO: 0.06 10*3/MM3 (ref 0–0.4)
EOSINOPHIL NFR BLD AUTO: 1.6 % (ref 0.3–6.2)
ERYTHROCYTE [DISTWIDTH] IN BLOOD BY AUTOMATED COUNT: 14.2 % (ref 12.3–15.4)
FERRITIN SERPL-MCNC: 58.8 NG/ML (ref 30–400)
GLOBULIN UR ELPH-MCNC: 5.1 GM/DL
GLUCOSE SERPL-MCNC: 94 MG/DL (ref 65–99)
HCT VFR BLD AUTO: 25.6 % (ref 37.5–51)
HDLC SERPL-MCNC: 34 MG/DL (ref 40–60)
HGB BLD-MCNC: 8.2 G/DL (ref 13–17.7)
IMM GRANULOCYTES # BLD AUTO: 0.01 10*3/MM3 (ref 0–0.05)
IMM GRANULOCYTES NFR BLD AUTO: 0.3 % (ref 0–0.5)
IRON 24H UR-MRATE: 53 MCG/DL (ref 59–158)
IRON SATN MFR SERPL: 18 % (ref 20–50)
LDLC SERPL CALC-MCNC: 50 MG/DL (ref 0–100)
LDLC/HDLC SERPL: 1.54 {RATIO}
LYMPHOCYTES # BLD AUTO: 1.22 10*3/MM3 (ref 0.7–3.1)
LYMPHOCYTES NFR BLD AUTO: 32 % (ref 19.6–45.3)
MCH RBC QN AUTO: 34 PG (ref 26.6–33)
MCHC RBC AUTO-ENTMCNC: 32 G/DL (ref 31.5–35.7)
MCV RBC AUTO: 106.2 FL (ref 79–97)
MONOCYTES # BLD AUTO: 0.43 10*3/MM3 (ref 0.1–0.9)
MONOCYTES NFR BLD AUTO: 11.3 % (ref 5–12)
NEUTROPHILS NFR BLD AUTO: 2.08 10*3/MM3 (ref 1.7–7)
NEUTROPHILS NFR BLD AUTO: 54.5 % (ref 42.7–76)
PLATELET # BLD AUTO: 125 10*3/MM3 (ref 140–450)
PMV BLD AUTO: 10.7 FL (ref 6–12)
POTASSIUM SERPL-SCNC: 4.1 MMOL/L (ref 3.5–5.2)
PROT SERPL-MCNC: 8.5 G/DL (ref 6–8.5)
RBC # BLD AUTO: 2.41 10*6/MM3 (ref 4.14–5.8)
SODIUM SERPL-SCNC: 143 MMOL/L (ref 136–145)
TIBC SERPL-MCNC: 298 MCG/DL (ref 298–536)
TRANSFERRIN SERPL-MCNC: 200 MG/DL (ref 200–360)
TRIGL SERPL-MCNC: 43 MG/DL (ref 0–150)
VLDLC SERPL-MCNC: 11 MG/DL (ref 5–40)
WBC NRBC COR # BLD AUTO: 3.81 10*3/MM3 (ref 3.4–10.8)

## 2025-07-25 PROCEDURE — 80061 LIPID PANEL: CPT

## 2025-07-25 PROCEDURE — 86753 PROTOZOA ANTIBODY NOS: CPT

## 2025-07-25 PROCEDURE — 86618 LYME DISEASE ANTIBODY: CPT

## 2025-07-25 PROCEDURE — 83540 ASSAY OF IRON: CPT

## 2025-07-25 PROCEDURE — 86666 EHRLICHIA ANTIBODY: CPT

## 2025-07-25 PROCEDURE — 80053 COMPREHEN METABOLIC PANEL: CPT

## 2025-07-25 PROCEDURE — 85025 COMPLETE CBC W/AUTO DIFF WBC: CPT

## 2025-07-25 PROCEDURE — 82728 ASSAY OF FERRITIN: CPT

## 2025-07-25 PROCEDURE — 84466 ASSAY OF TRANSFERRIN: CPT

## 2025-07-25 RX ORDER — DOXYCYCLINE 100 MG/1
100 CAPSULE ORAL 2 TIMES DAILY
Qty: 14 CAPSULE | Refills: 0 | Status: SHIPPED | OUTPATIENT
Start: 2025-07-25 | End: 2025-08-01

## 2025-07-25 NOTE — PROGRESS NOTES
Chief Complaint  Chief Complaint   Patient presents with    Edema     F/U     Insect Bite     States has had little ticks on her       Subjective      Arvind Coates presents to [facility]  History of Present Illness  The patient is an 89-year-old male who presents today for follow-up on multiple chronic conditions including atrial fibrillation.    He reports experiencing poor sleep due to tick bites, which he has been removing himself. [place] and has found ticks on his legs and arms. He also mentions frequent mosquito bites on his back. He removed a tick from his arm this morning and another one yesterday evening.    He is not aware of any blood loss but does experience occasional nosebleeds, which have become more frequent recently, occurring daily. He has a history of nosebleeds throughout his life. He describes a sensation of blood in his throat during a nosebleed. He has been feeling weak recently and occasionally experiences dizziness upon standing. He has an upcoming appointment with his hematologist next year and with his nephrologist in 09/2025. He was given a stool test kit to check for blood in his stool when he was most recently seen on 7/15/2025.  It was at that time that he was found to be anemic with a hemoglobin of 7.9 and hematocrit of 24.2.    He continues to take Lasix daily along with potassium supplements. He recently visited his cardiologist, Dr. Jackson.    He has not yet received a new nebulizer machine.    He reports persistent leg swelling and is unsure if he has compression socks at home.      Objective     Medical History:  Past Medical History:   Diagnosis Date    Anemia     Bleeding     Bleeding issues post 1  colonoscopy and during 1 prep- caused to pass out    Broken bones     CHF (congestive heart failure)     Chronic kidney disease     COVID-19     3/2021    Hemorrhoids     Hyperlipidemia     Hypertension     Pneumonia      Pre-diabetes     Shortness of breath      Past Surgical History:   Procedure Laterality Date    APPENDECTOMY      BONE MARROW BIOPSY      COLONOSCOPY      2012?- normal per patient    COLONOSCOPY N/A 08/10/2022    Procedure: COLONOSCOPY WITH ORISE INJECTION/POLYPECTOMY /SNARE/CLIP APPLICATION X9;  Surgeon: Steven Holm MD;  Location: [facility] ENDOSCOPY;  Service: Gastroenterology;  Laterality: N/A;  COLON POLYPS  DIVERTICULOSIS    CORONARY ARTERY BYPASS GRAFT      triple    ENDOSCOPY N/A 08/10/2022    Procedure: ESOPHAGOGASTRODUODENOSCOPY WITH BIOPSIES;  Surgeon: Steven Holm MD;  Location: [facility] ENDOSCOPY;  Service: Gastroenterology;  Laterality: N/A;  HIATAL HERNIA  ERIK ULCER    HEMORRHOIDECTOMY      UPPER GASTROINTESTINAL ENDOSCOPY        Social History     Tobacco Use    Smoking status: Never     Passive exposure: Yes    Smokeless tobacco: Never   Vaping Use    Vaping status: Never Used   Substance Use Topics    Alcohol use: Never    Drug use: Never     Family History   Problem Relation Age of Onset    Colon cancer Brother 55    Prostate cancer Brother     Diabetes Other     Malig Hyperthermia Neg Hx        Medications:  Prior to Admission medications    Medication Sig Start Date End Date Taking? Authorizing Provider   aspirin 81 MG chewable tablet Chew 1 tablet Daily. 4/4/21  Yes Jewels Love MD   atorvastatin (LIPITOR) 40 MG tablet Take 1 tablet by mouth Daily. 3/2/21  Yes Jewels Love MD   furosemide (Lasix) 20 MG tablet Take 1 tablet by mouth Daily. 7/23/25  Yes Radha Klein APRN   ipratropium-albuterol (DUO-NEB) 0.5-2.5 mg/3 ml nebulizer Take 3 mL by nebulization Every 4 (Four) Hours As Needed for Wheezing.   Yes Jewels Love MD   lansoprazole (Prevacid) 15 MG capsule Take 1 capsule by mouth Daily. 6/4/25  Yes Radha Klein APRN   metoprolol succinate XL (TOPROL-XL) 50 MG 24 hr tablet Take 1.5 tablets by mouth Daily. 7/27/22  Yes  "Jewels Love MD   pioglitazone (Actos) 15 MG tablet Take 1 tablet by mouth Daily. 1/17/25  Yes Radha Klein APRN   potassium chloride 10 MEQ CR tablet Take 1 tablet by mouth 2 (Two) Times a Day. 7/23/25  Yes Radha Klein APRN   sennosides-docusate (Senokot S) 8.6-50 MG per tablet Take 1 tablet by mouth As Needed for Constipation.   Yes Jewels Love MD   warfarin (COUMADIN) 2 MG tablet Take 1 tablet by mouth 5 (Five) Times a Week. Monday  Wednesday   Thursday   Saturday   Bebeto 3/9/21  Yes Jewels Love MD   warfarin (COUMADIN) 2 MG tablet Take 0.5 tablets by mouth 2 (Two) Times a Week. Tuesday Friday   Yes Jewels Love MD        Allergies:   Patient has no known allergies.    Health Maintenance Due   Topic Date Due    DIABETIC FOOT EXAM  Never done    TDAP/TD VACCINES (1 - Tdap) Never done    ZOSTER VACCINE (1 of 2) Never done    RSV Vaccine - Adults (1 - 1-dose 75+ series) Never done    DIABETIC EYE EXAM  06/16/2022    LIPID PANEL  07/11/2024    COVID-19 Vaccine (5 - 2024-25 season) 09/01/2024         Vital Signs:   /68   Pulse 59   Temp 97.9 °F (36.6 °C)   Ht 175.3 cm (69\")   Wt 70.3 kg (155 lb)   SpO2 96%   BMI 22.89 kg/m²     Wt Readings from Last 3 Encounters:   07/25/25 70.3 kg (155 lb)   07/15/25 68.2 kg (150 lb 5.7 oz)   06/04/25 66.5 kg (146 lb 11.2 oz)     BP Readings from Last 3 Encounters:   07/25/25 138/68   07/15/25 135/62   06/04/25 122/54       BMI is within normal parameters. No other follow-up for BMI required.       Physical Exam  Vitals reviewed.   Constitutional:       Appearance: Normal appearance. He is well-developed.   HENT:      Head: Normocephalic and atraumatic.   Eyes:      Conjunctiva/sclera: Conjunctivae normal.      Pupils: Pupils are equal, round, and reactive to light.   Cardiovascular:      Rate and Rhythm: Normal rate and regular rhythm.      Heart sounds: Murmur heard.      No friction rub. No gallop. "   Pulmonary:      Effort: Pulmonary effort is normal. No tachypnea or respiratory distress.      Breath sounds: Normal breath sounds. No wheezing or rhonchi.   Abdominal:      General: Bowel sounds are normal. There is no distension.      Palpations: Abdomen is soft.      Tenderness: There is no abdominal tenderness.   Musculoskeletal:      Right lower le+ Pitting Edema present.      Left lower le+ Pitting Edema present.   Skin:     General: Skin is warm and dry.      Findings: Lesion present.      Comments: Multiple lesions resembling insect bites to arms and legs   Neurological:      Mental Status: He is alert and oriented to person, place, and time.      Cranial Nerves: No cranial nerve deficit, dysarthria or facial asymmetry.      Motor: Weakness and tremor present.   Psychiatric:         Mood and Affect: Mood and affect normal.         Behavior: Behavior normal.         Thought Content: Thought content normal.         Judgment: Judgment normal.       Physical Exam  Respiratory: Clear to auscultation, no wheezing, rales or rhonchi  Extremities: Bilateral lower extremity swelling with pitting edema  Skin: Tick bites observed on the arms and his legs      Result Review :    The following data was reviewed by OLIVIA Nazario on 25 at 17:11 EDT:    Common labs          2025    04:38 2025    12:07 7/15/2025    10:09   Common Labs   Glucose 95  78  129    BUN 28  41.0  30.9    Creatinine 1.80  2.11  1.93    Sodium 140  141  141    Potassium 3.3  4.2  3.7    Chloride 98  104  105    Calcium 9.8  10.4  10.3    Albumin 3.3   3.5     3.3    Total Bilirubin 0.6   0.5    Alkaline Phosphatase 64   64    AST (SGOT) 15   20    ALT (SGPT) 9   8    WBC 3.73  4.22  3.34    Hemoglobin 9.4  8.3  7.9    Hematocrit 30.2  25.1  24.2    Platelets 178  179  130        XR Bone Survey Complete  Result Date: 2025  Impression: 1.Multiple subcentimeter lucent lesions in the axial and appendicular  skeleton as described above. These are new since the 2022 bone survey and are compatible with the patient's reported history of multiple myeloma. 2.Multiple senescent changes as described above 3.Cardiomegaly and evidence of previous CABG. moderate to large hiatal hernia is present. Electronically Signed: Ponchoriddhi Brown, DO  7/18/2025 4:58 PM EDT  Workstation ID: [id number]    FL Video Swallow With Speech Single Contrast  Result Date: 5/23/2025  Impression: No aspiration or penetration. See speech pathology note for additional clinical recommendations. Electronically Signed: Manuel Rosenthal MD  5/23/2025 11:49 AM EDT  Workstation ID: [id number]    CT Chest Without Contrast Diagnostic  Result Date: 5/22/2025  There is suspected pulmonary edema with vascular congestion. Superimposed pneumonia cannot be excluded, especially in the lower lobe of the right lung. There may be chronic interstitial lung disease, as well. There are small-to-moderate bilateral pleural effusions. There is mild cardiomegaly. Native coronary artery calcifications are seen. There is a left-sided CIED. The patient has undergone median sternotomy and CABG (coronary artery bypass graft) surgery. There is suspected anasarca. Please see the above comments for further detail.    Portions of this note were completed with a voice recognition program.  5/22/2025 11:30 PM by Edmar Degroot MD on Workstation: [employer]      XR Chest 1 View  Result Date: 5/22/2025  Impression: 1.Patchy opacities and mild consolidative changes noted within the right lung, suspicious for pneumonia. 2.Mild diffuse interstitial thickening, similar in appearance to prior study, nonspecific. This may represent interstitial lung disease, emphysema or mild pulmonary vascular congestion 3.Possible trace bilateral pleural effusions. Electronically Signed: Akash Smith, DO  5/22/2025 7:06 PM EDT  Workstation ID: [id number]    US Renal Bilateral  Result Date: 4/25/2025  Impression: 1.No  hydronephrosis. 2.Simple appearing bilateral renal cysts. Electronically Signed: Jose Antonio Mitchell MD  4/25/2025 11:19 AM EDT  Workstation ID: [id number]      Results               Assessment and Plan    Diagnoses and all orders for this visit:    1. Chronic congestive heart failure, unspecified heart failure type (Primary)    2. Edema of both legs    3. Stage 3b chronic kidney disease    4. Atrial fibrillation, unspecified type    5. Mixed hyperlipidemia  -     Lipid Panel    6. Primary hypertension    7. Type 2 diabetes mellitus with stage 3a chronic kidney disease, without long-term current use of insulin    8. Anemia, unspecified type  -     CBC w AUTO Differential  -     Comprehensive metabolic panel  -     Iron Profile w/o Ferritin  -     Ferritin    9. Tick bite of upper arm, unspecified laterality, initial encounter  -     Tickborne Disease Antibody Profile, Serum  -     Ehrlichia Antibody Panel  -     doxycycline (VIBRAMYCIN) 100 MG capsule; Take 1 capsule by mouth 2 (Two) Times a Day for 7 days.  Dispense: 14 capsule; Refill: 0    10. Pulmonary emphysema, unspecified emphysema type       Assessment & Plan  1. Tick bites.  - Reports multiple recent tick bites, including one removed this morning.  - Physical exam shows tick bites on the arm and legs.  - Discussion about the need to spray the house to reduce tick population.  - Antibiotics prescribed to prevent potential tick-borne diseases.    2. Anemia.  - Blood counts have been progressively decreasing, currently close to 10, compared to 12 a year ago.  - Reports feeling weak and dizzy, likely due to low blood counts.  - Blood count test will be conducted today; potential need for blood transfusion and hospital admission if counts are lower.  - Review of nosebleeds and history of low blood counts.  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  Patient  recently had a bone scan which was suspicious for progression of disease. Patient to follow up with hematology/oncology for discussion of results and treatment plan.    3. Atrial fibrillation.  - Currently taking Lasix and potassium daily.  - Physical exam findings discussed with cardiologist, Dr. Jackson.  - Recent visit to cardiologist; next appointment scheduled for 07/2026.  - Monitoring medication effectiveness and response to treatment.    4. Leg swelling.  - Reports persistent leg swelling and leaking.  - Physical exam shows 1-2 + pitting edema.   - Advised to use compression socks to help manage the swelling.  - Discussion about difficulty using compression socks.          Smoking Cessation:    Arvind Coates  reports that he has never smoked. He has been exposed to tobacco smoke. He has never used smokeless tobacco.           Follow Up   Return in about 1 month (around 8/25/2025) for Next scheduled follow up.  Patient was given instructions and counseling regarding his condition or for health maintenance advice. Please see specific information pulled into the AVS if appropriate.     Please note that portions of this note were completed with a voice recognition program.    Patient or patient representative verbalized consent for the use of Ambient Listening during the visit with  OLIVIA Nazario for chart documentation. 7/25/2025  16:18 EDT

## 2025-07-29 ENCOUNTER — LAB (OUTPATIENT)
Dept: ONCOLOGY | Facility: HOSPITAL | Age: OVER 89
End: 2025-07-29
Payer: MEDICARE

## 2025-07-29 ENCOUNTER — OFFICE VISIT (OUTPATIENT)
Dept: ONCOLOGY | Facility: HOSPITAL | Age: OVER 89
End: 2025-07-29
Payer: MEDICARE

## 2025-07-29 VITALS
TEMPERATURE: 98.2 F | OXYGEN SATURATION: 93 % | RESPIRATION RATE: 16 BRPM | HEART RATE: 82 BPM | WEIGHT: 149.69 LBS | DIASTOLIC BLOOD PRESSURE: 66 MMHG | HEIGHT: 69 IN | SYSTOLIC BLOOD PRESSURE: 125 MMHG | BODY MASS INDEX: 22.17 KG/M2

## 2025-07-29 DIAGNOSIS — D64.9 ANEMIA, UNSPECIFIED TYPE: Primary | ICD-10-CM

## 2025-07-29 DIAGNOSIS — C90.00 MULTIPLE MYELOMA NOT HAVING ACHIEVED REMISSION: Primary | ICD-10-CM

## 2025-07-29 DIAGNOSIS — D47.2 MONOCLONAL GAMMOPATHY: ICD-10-CM

## 2025-07-29 DIAGNOSIS — D47.2 MONOCLONAL GAMMOPATHY: Primary | ICD-10-CM

## 2025-07-29 DIAGNOSIS — C90.00 MULTIPLE MYELOMA NOT HAVING ACHIEVED REMISSION: ICD-10-CM

## 2025-07-29 LAB
ABO GROUP BLD: NORMAL
ABO GROUP BLD: NORMAL
ALBUMIN SERPL-MCNC: 3.6 G/DL (ref 3.5–5.2)
ALBUMIN/GLOB SERPL: 0.7 G/DL
ALP SERPL-CCNC: 73 U/L (ref 39–117)
ALT SERPL W P-5'-P-CCNC: 28 U/L (ref 1–41)
ANION GAP SERPL CALCULATED.3IONS-SCNC: 14.6 MMOL/L (ref 5–15)
ANISOCYTOSIS BLD QL: NORMAL
AST SERPL-CCNC: 34 U/L (ref 1–40)
BASOPHILS # BLD AUTO: 0.01 10*3/MM3 (ref 0–0.2)
BASOPHILS NFR BLD AUTO: 0.3 % (ref 0–1.5)
BILIRUB SERPL-MCNC: 0.6 MG/DL (ref 0–1.2)
BLD GP AB SCN SERPL QL: NEGATIVE
BUN SERPL-MCNC: 39.2 MG/DL (ref 8–23)
BUN/CREAT SERPL: 19.8 (ref 7–25)
CALCIUM SPEC-SCNC: 10.5 MG/DL (ref 8.6–10.5)
CHLORIDE SERPL-SCNC: 103 MMOL/L (ref 98–107)
CO2 SERPL-SCNC: 24.4 MMOL/L (ref 22–29)
CREAT SERPL-MCNC: 1.98 MG/DL (ref 0.76–1.27)
DEPRECATED RDW RBC AUTO: 67.4 FL (ref 37–54)
EGFRCR SERPLBLD CKD-EPI 2021: 31.7 ML/MIN/1.73
EOSINOPHIL # BLD AUTO: 0.07 10*3/MM3 (ref 0–0.4)
EOSINOPHIL NFR BLD AUTO: 2.1 % (ref 0.3–6.2)
ERYTHROCYTE [DISTWIDTH] IN BLOOD BY AUTOMATED COUNT: 17.4 % (ref 12.3–15.4)
FERRITIN SERPL-MCNC: 43.44 NG/ML (ref 30–400)
GLOBULIN UR ELPH-MCNC: 4.9 GM/DL
GLUCOSE SERPL-MCNC: 113 MG/DL (ref 65–99)
HCT VFR BLD AUTO: 25.6 % (ref 37.5–51)
HGB BLD-MCNC: 8.1 G/DL (ref 13–17.7)
IMM GRANULOCYTES # BLD AUTO: 0 10*3/MM3 (ref 0–0.05)
IMM GRANULOCYTES NFR BLD AUTO: 0 % (ref 0–0.5)
IRON 24H UR-MRATE: 76 MCG/DL (ref 59–158)
IRON SATN MFR SERPL: 26 % (ref 20–50)
LDH SERPL-CCNC: 158 U/L (ref 135–225)
LYMPHOCYTES # BLD AUTO: 0.82 10*3/MM3 (ref 0.7–3.1)
LYMPHOCYTES NFR BLD AUTO: 25.2 % (ref 19.6–45.3)
MACROCYTES BLD QL SMEAR: NORMAL
MCH RBC QN AUTO: 34.2 PG (ref 26.6–33)
MCHC RBC AUTO-ENTMCNC: 31.6 G/DL (ref 31.5–35.7)
MCV RBC AUTO: 108 FL (ref 79–97)
MONOCYTES # BLD AUTO: 0.32 10*3/MM3 (ref 0.1–0.9)
MONOCYTES NFR BLD AUTO: 9.8 % (ref 5–12)
NEUTROPHILS NFR BLD AUTO: 2.04 10*3/MM3 (ref 1.7–7)
NEUTROPHILS NFR BLD AUTO: 62.6 % (ref 42.7–76)
NRBC BLD AUTO-RTO: 0 /100 WBC (ref 0–0.2)
PLATELET # BLD AUTO: 128 10*3/MM3 (ref 140–450)
PMV BLD AUTO: 11 FL (ref 6–12)
POTASSIUM SERPL-SCNC: 4.1 MMOL/L (ref 3.5–5.2)
PROT SERPL-MCNC: 8.5 G/DL (ref 6–8.5)
RBC # BLD AUTO: 2.37 10*6/MM3 (ref 4.14–5.8)
RH BLD: POSITIVE
RH BLD: POSITIVE
SMALL PLATELETS BLD QL SMEAR: NORMAL
SODIUM SERPL-SCNC: 142 MMOL/L (ref 136–145)
T&S EXPIRATION DATE: NORMAL
TIBC SERPL-MCNC: 292 MCG/DL (ref 298–536)
TRANSFERRIN SERPL-MCNC: 196 MG/DL (ref 200–360)
WBC MORPH BLD: NORMAL
WBC NRBC COR # BLD AUTO: 3.26 10*3/MM3 (ref 3.4–10.8)

## 2025-07-29 PROCEDURE — 86901 BLOOD TYPING SEROLOGIC RH(D): CPT

## 2025-07-29 PROCEDURE — 86901 BLOOD TYPING SEROLOGIC RH(D): CPT | Performed by: NURSE PRACTITIONER

## 2025-07-29 PROCEDURE — 83540 ASSAY OF IRON: CPT | Performed by: NURSE PRACTITIONER

## 2025-07-29 PROCEDURE — 80053 COMPREHEN METABOLIC PANEL: CPT | Performed by: NURSE PRACTITIONER

## 2025-07-29 PROCEDURE — 83615 LACTATE (LD) (LDH) ENZYME: CPT | Performed by: NURSE PRACTITIONER

## 2025-07-29 PROCEDURE — 36415 COLL VENOUS BLD VENIPUNCTURE: CPT | Performed by: NURSE PRACTITIONER

## 2025-07-29 PROCEDURE — 1126F AMNT PAIN NOTED NONE PRSNT: CPT | Performed by: NURSE PRACTITIONER

## 2025-07-29 PROCEDURE — 85025 COMPLETE CBC W/AUTO DIFF WBC: CPT | Performed by: NURSE PRACTITIONER

## 2025-07-29 PROCEDURE — 1159F MED LIST DOCD IN RCRD: CPT | Performed by: NURSE PRACTITIONER

## 2025-07-29 PROCEDURE — 86900 BLOOD TYPING SEROLOGIC ABO: CPT

## 2025-07-29 PROCEDURE — 86900 BLOOD TYPING SEROLOGIC ABO: CPT | Performed by: NURSE PRACTITIONER

## 2025-07-29 PROCEDURE — 82607 VITAMIN B-12: CPT | Performed by: NURSE PRACTITIONER

## 2025-07-29 PROCEDURE — 99214 OFFICE O/P EST MOD 30 MIN: CPT | Performed by: NURSE PRACTITIONER

## 2025-07-29 PROCEDURE — 86850 RBC ANTIBODY SCREEN: CPT | Performed by: NURSE PRACTITIONER

## 2025-07-29 PROCEDURE — 82746 ASSAY OF FOLIC ACID SERUM: CPT | Performed by: NURSE PRACTITIONER

## 2025-07-29 PROCEDURE — G0463 HOSPITAL OUTPT CLINIC VISIT: HCPCS | Performed by: NURSE PRACTITIONER

## 2025-07-29 PROCEDURE — 1160F RVW MEDS BY RX/DR IN RCRD: CPT | Performed by: NURSE PRACTITIONER

## 2025-07-29 PROCEDURE — 82728 ASSAY OF FERRITIN: CPT | Performed by: NURSE PRACTITIONER

## 2025-07-29 PROCEDURE — 84466 ASSAY OF TRANSFERRIN: CPT | Performed by: NURSE PRACTITIONER

## 2025-07-29 PROCEDURE — 85007 BL SMEAR W/DIFF WBC COUNT: CPT | Performed by: NURSE PRACTITIONER

## 2025-07-29 NOTE — PROGRESS NOTES
Chief Complaint/Reason for Referral:  Bence Millan proteinuria    Klein, Radha Newton,*  Klein, Radhaanai Newton, APRN    Records Obtained:  Records of the patients history including those obtained from [facility] and patient information were reviewed and summarized in detail.    Subjective    History of Present Illness  The patient is an 89-year-old  male who presents for follow-up on lab results. He was recently seen by Dr. 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 He had not been seen in over a year prior to this visit. He is on Coumadin.    Recent lab work completed on 07/25/2025 showed an increased creatinine level of 2.15, high normal calcium at 10.2, decreased iron at 53, normal ferritin at 58, and normal iron saturation at 18%. Lab work on 07/15/2025 revealed an IgA monoclonal protein with kappa light chain specificity, decreased IgG level at 478, elevated IgA level at 1846, normal IgM at 22, an M spike of 1.7, elevated kappa light chains at 2367, and an elevated kappa lambda ratio at 174.04. Protein electrophoresis showed an M spike in the urine of 61.1. A skeletal survey on 07/15/2025 showed multiple subcentimeter lucent lesions in the axial and appendicular skeleton, which are new since the 2022 bone survey and compatible with multiple myeloma.    He experienced a few nosebleeds approximately 3 weeks ago. He has a pacemaker and is currently on Coumadin for atrial fibrillation.    He has been diagnosed with chronic kidney disease and has been advised to maintain adequate hydration. Recent lab results indicate worsening kidney function.    He has been experiencing back pain, initially attributed to a rib fracture, but further investigation revealed it to be a pulled muscle. He was provided with a brace by Dr. Love.    He has been informed of his low iron levels and is considering a blood transfusion. He has previously received a blood  transfusion, which significantly improved his condition.    He has been diagnosed with MGUS and underwent a bone marrow biopsy on 2022, which showed 5 to 10% plasma cell neoplasm in the bone marrow. He is open to undergoing another biopsy and PET scan if necessary.    He is due for an eye examination for cataracts. He had a severe case of COVID-19, which required hospitalization for 2.5 weeks. He reports no dizziness or lightheadedness today but notes occasional dizziness when standing up quickly from a seated or downward position. He has not experienced any recent falls.    Results   : 1-2. Bone marrow, aspirate smears, clot section and core biopsy:               - Plasma cell neoplasm, 5-10% of bone marrow cellularity     3. Peripheral blood (CBC and smear):               - WBC:  Adequate, normal morphology               - RBC:  Normocytic normochromic RBCs with frequent acanthocytes               - Platelets:  Normal platelet count and morphology     Remarks:  The above positive (malignant) diagnosis was called to Ke[place] in Dr. Shen's office at 14:36 EDT on 6/15/2022 by et.            Labs   - Creatinine: 2025, 2.15   - Calcium: 2025, 10.2   - Iron: 2025, 53   - Ferritin: 2025, 58   - Iron saturation: 2025, 18%   - IgA monoclonal protein with kappa light chain specificity: 07/15/2025, Detected   - Ig/15/2025, 478   - IgA: 07/15/2025, 1846   - IgM: 07/15/2025, 22   - M spike: 07/15/2025, 1.7   - Kappa light chains: 07/15/2025, 2367   - Kappa lambda ratio: 07/15/2025, 174.04   - Protein electrophoresis: M spike in the urine of 61.1    Imaging   - Skeletal survey: 07/15/2025, Multiple subcentimeter lucent lesions in the axial and appendicular skeleton     Oncology/Hematology History    No history exists.       Review of Systems   Constitutional: Negative.    HENT: Negative.     Eyes: Negative.    Respiratory: Negative.     Cardiovascular: Negative.     Gastrointestinal: Negative.    Endocrine: Negative.    Genitourinary: Negative.    Musculoskeletal: Negative.    Skin: Negative.    Allergic/Immunologic: Negative.    Neurological: Negative.    Hematological: Negative.    Psychiatric/Behavioral: Negative.         Current Outpatient Medications on File Prior to Visit   Medication Sig Dispense Refill    aspirin 81 MG chewable tablet Chew 1 tablet Daily.      atorvastatin (LIPITOR) 40 MG tablet Take 1 tablet by mouth Daily.      doxycycline (VIBRAMYCIN) 100 MG capsule Take 1 capsule by mouth 2 (Two) Times a Day for 7 days. 14 capsule 0    furosemide (Lasix) 20 MG tablet Take 1 tablet by mouth Daily. 30 tablet 1    ipratropium-albuterol (DUO-NEB) 0.5-2.5 mg/3 ml nebulizer Take 3 mL by nebulization Every 4 (Four) Hours As Needed for Wheezing.      lansoprazole (Prevacid) 15 MG capsule Take 1 capsule by mouth Daily. 30 capsule 2    metoprolol succinate XL (TOPROL-XL) 50 MG 24 hr tablet Take 1.5 tablets by mouth Daily.      pioglitazone (Actos) 15 MG tablet Take 1 tablet by mouth Daily. 90 tablet 1    potassium chloride 10 MEQ CR tablet Take 1 tablet by mouth 2 (Two) Times a Day. 30 tablet 1    sennosides-docusate (Senokot S) 8.6-50 MG per tablet Take 1 tablet by mouth As Needed for Constipation.      warfarin (COUMADIN) 2 MG tablet Take 1 tablet by mouth 5 (Five) Times a Week. Monday Wednesday Thursday Saturday Sunday      warfarin (COUMADIN) 2 MG tablet Take 0.5 tablets by mouth 2 (Two) Times a Week. Tuesday Friday       No current facility-administered medications on file prior to visit.       No Known Allergies  Past Medical History:   Diagnosis Date    Anemia     Bleeding     Bleeding issues post 1  colonoscopy and during 1 prep- caused to pass out    Broken bones     CHF (congestive heart failure)     Chronic kidney disease     COVID-19     3/2021    Hemorrhoids     Hyperlipidemia     Hypertension     Pneumonia     Pre-diabetes     Shortness of breath       Past Surgical History:   Procedure Laterality Date    APPENDECTOMY      BONE MARROW BIOPSY      COLONOSCOPY      2012?- normal per patient    COLONOSCOPY N/A 08/10/2022    Procedure: COLONOSCOPY WITH ORISE INJECTION/POLYPECTOMY /SNARE/CLIP APPLICATION X9;  Surgeon: Steven Holm MD;  Location: [facility] ENDOSCOPY;  Service: Gastroenterology;  Laterality: N/A;  COLON POLYPS  DIVERTICULOSIS    CORONARY ARTERY BYPASS GRAFT      triple    ENDOSCOPY N/A 08/10/2022    Procedure: ESOPHAGOGASTRODUODENOSCOPY WITH BIOPSIES;  Surgeon: Steven Holm MD;  Location: [facility] ENDOSCOPY;  Service: Gastroenterology;  Laterality: N/A;  HIATAL HERNIA  ERIK ULCER    HEMORRHOIDECTOMY      UPPER GASTROINTESTINAL ENDOSCOPY       Social History     Socioeconomic History    Marital status: Single   Tobacco Use    Smoking status: Never     Passive exposure: Yes    Smokeless tobacco: Never   Vaping Use    Vaping status: Never Used   Substance and Sexual Activity    Alcohol use: Never    Drug use: Never    Sexual activity: Yes     Partners: Female     Birth control/protection: None     Family History   Problem Relation Age of Onset    Colon cancer Brother 55    Prostate cancer Brother     Diabetes Other     Malig Hyperthermia Neg Hx      Immunization History   Administered Date(s) Administered    COVID-19 (MODERNA) 1st,2nd,3rd Dose Monovalent 03/10/2021, 10/20/2021, 02/28/2022    COVID-19 (MODERNA) Monovalent Original Booster 07/18/2022    Fluzone High-Dose 65+YRS 01/31/2025    Fluzone High-Dose 65+yrs 10/08/2021, 10/12/2022, 10/25/2023    Pneumococcal Conjugate 20-Valent (PCV20) 06/08/2022       Tobacco Use: Medium Risk (7/29/2025)    Patient History     Smoking Tobacco Use: Never     Smokeless Tobacco Use: Never     Passive Exposure: Yes       Objective     Physical Exam  Vitals and nursing note reviewed.   Constitutional:       General: He is not in acute distress.     Appearance: Normal appearance.   Cardiovascular:      " Rate and Rhythm: Normal rate and regular rhythm.      Heart sounds: No murmur heard.  Pulmonary:      Effort: Pulmonary effort is normal. No respiratory distress.      Breath sounds: Normal breath sounds.   Musculoskeletal:      Right lower leg: No edema.      Left lower leg: No edema.   Skin:     Findings: No bruising, lesion or rash.   Neurological:      Mental Status: He is alert.   Psychiatric:         Mood and Affect: Mood normal.         Vitals:    07/29/25 1407   BP: 125/66   Pulse: 82   Resp: 16   Temp: 98.2 °F (36.8 °C)   TempSrc: Oral   SpO2: 93%   Weight: 67.9 kg (149 lb 11.1 oz)   Height: 175.3 cm (69.02\")   PainSc: 0-No pain       Wt Readings from Last 3 Encounters:   07/29/25 67.9 kg (149 lb 11.1 oz)   07/25/25 70.3 kg (155 lb)   07/15/25 68.2 kg (150 lb 5.7 oz)        BMI is within normal parameters. No other follow-up for BMI required.                 ECOG: (0) Fully Active - Able to Carry On All Pre-disease Performance Without Restriction  Fall Risk Assessment was completed, and patient is at moderate risk for falls.  PHQ-9 Total Score:         The patient is  experiencing fatigue. Fatigue score: 5    PT/OT Functional Screening: PT fx screen : No needs identified  Speech Functional Screening: Speech fx screen : No needs identified  Rehab to be ordered: Rehab to be ordered : No needs identified        Result Review :  The following data was reviewed by: OLIVIA Andrade on 07/29/2025:  Lab Results   Component Value Date    HGB 8.2 (L) 07/25/2025    HCT 25.6 (L) 07/25/2025    .2 (H) 07/25/2025     (L) 07/25/2025    WBC 3.81 07/25/2025    NEUTROABS 2.08 07/25/2025    LYMPHSABS 1.22 07/25/2025    MONOSABS 0.43 07/25/2025    EOSABS 0.06 07/25/2025    BASOSABS 0.01 07/25/2025     Lab Results   Component Value Date    GLUCOSE 94 07/25/2025    BUN 36.0 (H) 07/25/2025    CREATININE 2.15 (H) 07/25/2025     07/25/2025    K 4.1 07/25/2025     07/25/2025    CO2 20.0 (L) " 07/25/2025    CALCIUM 10.2 07/25/2025    PROTEINTOT 8.5 07/25/2025    ALBUMIN 3.4 (L) 07/25/2025    BILITOT 0.6 07/25/2025    ALKPHOS 65 07/25/2025    AST 39 07/25/2025    ALT 21 07/25/2025     Lab Results   Component Value Date     10/19/2022    Ferritin 58.80 07/25/2025    Folate 10.90 06/04/2025     Lab Results   Component Value Date    IRON 53 (L) 07/25/2025    LABIRON 18 (L) 07/25/2025    TRANSFERRIN 200 07/25/2025    TIBC 298 07/25/2025     Lab Results   Component Value Date     10/19/2022    FERRITIN 58.80 07/25/2025    [id number] 441 05/22/2025    FOLATE 10.90 06/04/2025     Lab Results   Component Value Date    PSA 9.250 (H) 10/25/2023            Assessment and Plan:  There are no diagnoses linked to this encounter.    Assessment & Plan  1. Multiple Myeloma.  Recent lab work indicates an M spike in both blood and urine, suggesting a progression from MGUS to multiple myeloma. A skeletal survey showed multiple subcentimeter lucent lesions in the axial and appendicular skeleton, compatible with multiple myeloma. A PET scan and a bone marrow biopsy are recommended to assess the extent of the disease. His daughter, Kari Pineda, will be contacted to discuss these recommendations.  The treatment plan includes performing a bone marrow biopsy to evaluate the plasma cell neoplasm percentage and a PET scan to identify any bone lesions. The goal of the treatment is to assess the extent of the disease and plan further management. Potential side effects of the procedures include pain and discomfort at the biopsy site and possible allergic reactions to the PET scan contrast. Supportive care measures will include pain management and monitoring for any adverse reactions. Spoke to patient's daughter today who is on his list of contacts to discuss father's care.     2. Chronic Kidney Disease.  Kidney function has deteriorated, with creatinine levels increasing from 1.93 to 2.15. Advised to drink plenty of water  to manage the condition. Blood work will be conducted today to monitor kidney function.    3. Iron Deficiency.  Recent iron studies show decreased iron levels at 53, with ferritin in the normal range at 58 and iron saturation at 18%. Blood work will be conducted today to reassess iron levels and determine if a transfusion is needed.    4. Back Pain.  Intermittent back pain, previously attributed to a rib fracture. A brace was provided by Dr. Love to manage the pain.    5. Nosebleeds.  Experienced nosebleeds a few weeks ago while on Coumadin. Blood work will be conducted today to monitor coagulation status.        I spent 30 minutes caring for Arvind on this date of service. This time includes time spent by me in the following activities:preparing for the visit, reviewing tests, obtaining and/or reviewing a separately obtained history, performing a medically appropriate examination and/or evaluation , counseling and educating the patient/family/caregiver, ordering medications, tests, or procedures, referring and communicating with other health care professionals , documenting information in the medical record, and independently interpreting results and communicating that information with the patient/family/caregiver    Patient Follow Up: follow up  with MD after bone marrow biopsy results and PET scan.     Patient was given instructions and counseling regarding his condition or for health maintenance advice. Please see specific information pulled into the AVS if appropriate.     OLIVIA Andrade    7/29/2025      Patient or patient representative verbalized consent for the use of Ambient Listening during the visit with  OLIVIA Andrade for chart documentation. 7/29/2025  15:23 EDT

## 2025-07-30 DIAGNOSIS — C90.00 MULTIPLE MYELOMA NOT HAVING ACHIEVED REMISSION: Primary | ICD-10-CM

## 2025-07-30 LAB
A PHAGOCYTOPH IGG TITR SER IF: NEGATIVE {TITER}
A PHAGOCYTOPH IGG TITR SER IF: NEGATIVE {TITER}
A PHAGOCYTOPH IGM TITR SER IF: NEGATIVE {TITER}
B BURGDOR IGG+IGM SER QL IA: NEGATIVE
B MICROTI IGG TITR SER: NORMAL {TITER}
E CHAFFEENSIS IGG TITR SER IF: NEGATIVE {TITER}
E CHAFFEENSIS IGG TITR SER IF: NEGATIVE {TITER}
E CHAFFEENSIS IGM TITR SER IF: NEGATIVE {TITER}
FOLATE SERPL-MCNC: >20 NG/ML (ref 4.78–24.2)
RESULT COMMENT:: NORMAL
SERVICE CMNT-IMP: NORMAL
VIT B12 BLD-MCNC: 476 PG/ML (ref 211–946)

## 2025-07-31 ENCOUNTER — TELEPHONE (OUTPATIENT)
Dept: ONCOLOGY | Facility: HOSPITAL | Age: OVER 89
End: 2025-07-31
Payer: MEDICARE

## 2025-08-04 ENCOUNTER — HOSPITAL ENCOUNTER (OUTPATIENT)
Dept: PET IMAGING | Facility: HOSPITAL | Age: OVER 89
Discharge: HOME | End: 2025-08-04
Payer: MEDICARE

## 2025-08-04 DIAGNOSIS — D47.2 MONOCLONAL GAMMOPATHY: ICD-10-CM

## 2025-08-04 DIAGNOSIS — C90.00 MULTIPLE MYELOMA NOT HAVING ACHIEVED REMISSION: ICD-10-CM

## 2025-08-04 PROCEDURE — 34310000005 FLUDEOXYGLUCOSE F18 SOLUTION: Performed by: NURSE PRACTITIONER

## 2025-08-04 PROCEDURE — A9552 F18 FDG: HCPCS | Performed by: NURSE PRACTITIONER

## 2025-08-04 PROCEDURE — 78816 PET IMAGE W/CT FULL BODY: CPT

## 2025-08-04 RX ADMIN — FLUDEOXYGLUCOSE F 18 1 DOSE: 200 INJECTION, SOLUTION INTRAVENOUS at 11:00

## 2025-08-07 ENCOUNTER — HOSPITAL ENCOUNTER (OUTPATIENT)
Dept: CT IMAGING | Facility: HOSPITAL | Age: OVER 89
Discharge: HOME | End: 2025-08-07
Payer: MEDICARE

## 2025-08-07 VITALS
DIASTOLIC BLOOD PRESSURE: 58 MMHG | HEART RATE: 61 BPM | SYSTOLIC BLOOD PRESSURE: 116 MMHG | OXYGEN SATURATION: 97 % | RESPIRATION RATE: 16 BRPM

## 2025-08-07 DIAGNOSIS — D47.2 MONOCLONAL GAMMOPATHY: ICD-10-CM

## 2025-08-07 DIAGNOSIS — C90.00 MULTIPLE MYELOMA NOT HAVING ACHIEVED REMISSION: ICD-10-CM

## 2025-08-07 LAB
ANISOCYTOSIS BLD QL: ABNORMAL
ANISOCYTOSIS BLD QL: NORMAL
APTT PPP: 29 SECONDS (ref 24.2–34.2)
BASOPHILS # BLD AUTO: 0.02 10*3/MM3 (ref 0–0.2)
BASOPHILS NFR BLD AUTO: 0.6 % (ref 0–1.5)
DEPRECATED RDW RBC AUTO: 67.5 FL (ref 37–54)
EOSINOPHIL # BLD AUTO: 0.14 10*3/MM3 (ref 0–0.4)
EOSINOPHIL NFR BLD AUTO: 4.2 % (ref 0.3–6.2)
ERYTHROCYTE [DISTWIDTH] IN BLOOD BY AUTOMATED COUNT: 17.4 % (ref 12.3–15.4)
HCT VFR BLD AUTO: 28.1 % (ref 37.5–51)
HGB BLD-MCNC: 9.1 G/DL (ref 13–17.7)
HYPOCHROMIA BLD QL: ABNORMAL
IMM GRANULOCYTES # BLD AUTO: 0.01 10*3/MM3 (ref 0–0.05)
IMM GRANULOCYTES NFR BLD AUTO: 0.3 % (ref 0–0.5)
INR PPP: 1.28 (ref 0.86–1.15)
LYMPHOCYTES # BLD AUTO: 1.23 10*3/MM3 (ref 0.7–3.1)
LYMPHOCYTES # BLD MANUAL: 1.49 10*3/MM3 (ref 0.7–3.1)
LYMPHOCYTES NFR BLD AUTO: 37 % (ref 19.6–45.3)
LYMPHOCYTES NFR BLD MANUAL: 2 % (ref 5–12)
MACROCYTES BLD QL SMEAR: ABNORMAL
MACROCYTES BLD QL SMEAR: NORMAL
MCH RBC QN AUTO: 34.7 PG (ref 26.6–33)
MCHC RBC AUTO-ENTMCNC: 32.4 G/DL (ref 31.5–35.7)
MCV RBC AUTO: 107.3 FL (ref 79–97)
MONOCYTES # BLD AUTO: 0.3 10*3/MM3 (ref 0.1–0.9)
MONOCYTES # BLD: 0.07 10*3/MM3 (ref 0.1–0.9)
MONOCYTES NFR BLD AUTO: 9 % (ref 5–12)
NEUTROPHILS # BLD AUTO: 1.76 10*3/MM3 (ref 1.7–7)
NEUTROPHILS NFR BLD AUTO: 1.62 10*3/MM3 (ref 1.7–7)
NEUTROPHILS NFR BLD AUTO: 48.9 % (ref 42.7–76)
NEUTROPHILS NFR BLD MANUAL: 53 % (ref 42.7–76)
OVALOCYTES BLD QL SMEAR: ABNORMAL
PLATELET # BLD AUTO: 141 10*3/MM3 (ref 140–450)
PMV BLD AUTO: 10.7 FL (ref 6–12)
PROTHROMBIN TIME: 16.5 SECONDS (ref 11.8–14.9)
RBC # BLD AUTO: 2.62 10*6/MM3 (ref 4.14–5.8)
ROULEAUX BLD QL SMEAR: ABNORMAL
SCAN SLIDE: NORMAL
SMALL PLATELETS BLD QL SMEAR: ADEQUATE
VARIANT LYMPHS NFR BLD MANUAL: 45 % (ref 19.6–45.3)
WBC MORPH BLD: NORMAL
WBC NRBC COR # BLD AUTO: 3.32 10*3/MM3 (ref 3.4–10.8)

## 2025-08-07 PROCEDURE — 85007 BL SMEAR W/DIFF WBC COUNT: CPT | Performed by: INTERNAL MEDICINE

## 2025-08-07 PROCEDURE — 25010000002 FENTANYL CITRATE (PF) 50 MCG/ML SOLUTION: Performed by: RADIOLOGY

## 2025-08-07 PROCEDURE — 77012 CT SCAN FOR NEEDLE BIOPSY: CPT

## 2025-08-07 PROCEDURE — 85025 COMPLETE CBC W/AUTO DIFF WBC: CPT | Performed by: INTERNAL MEDICINE

## 2025-08-07 PROCEDURE — 85610 PROTHROMBIN TIME: CPT | Performed by: RADIOLOGY

## 2025-08-07 PROCEDURE — 85730 THROMBOPLASTIN TIME PARTIAL: CPT | Performed by: RADIOLOGY

## 2025-08-07 RX ORDER — FENTANYL CITRATE 50 UG/ML
INJECTION, SOLUTION INTRAMUSCULAR; INTRAVENOUS AS NEEDED
Status: COMPLETED | OUTPATIENT
Start: 2025-08-07 | End: 2025-08-07

## 2025-08-07 RX ORDER — LIDOCAINE HYDROCHLORIDE 20 MG/ML
INJECTION, SOLUTION INFILTRATION; PERINEURAL
Status: DISPENSED
Start: 2025-08-07 | End: 2025-08-07

## 2025-08-07 RX ADMIN — FENTANYL CITRATE 50 MCG: 50 INJECTION, SOLUTION INTRAMUSCULAR; INTRAVENOUS at 09:38

## 2025-08-08 LAB — Lab: NORMAL

## 2025-08-11 ENCOUNTER — DOCUMENTATION (OUTPATIENT)
Dept: ONCOLOGY | Facility: HOSPITAL | Age: OVER 89
End: 2025-08-11
Payer: MEDICARE

## 2025-08-11 LAB
CYTO UR: NORMAL
DIFF PNL MAR: NORMAL
LAB AP ASPIRATE SMEAR: NORMAL
LAB AP CASE REPORT: NORMAL
LAB AP CLINICAL INFORMATION: NORMAL
LAB AP CLOT SECTION: NORMAL
LAB AP CORE BIOPSY: NORMAL
LAB AP DIAGNOSIS COMMENT: NORMAL
LAB AP SPECIAL STAINS: NORMAL
PATH REPORT.FINAL DX SPEC: NORMAL
PATH REPORT.GROSS SPEC: NORMAL

## 2025-08-13 LAB — PLASMA CELL MYELOMA TARGETGENE PANEL RESULT: NORMAL

## 2025-08-18 LAB
CCV RESULT: NORMAL
CYTOGENETICS RESULT: NORMAL

## 2025-08-27 ENCOUNTER — OFFICE VISIT (OUTPATIENT)
Dept: ONCOLOGY | Facility: HOSPITAL | Age: OVER 89
End: 2025-08-27
Payer: MEDICARE

## 2025-08-27 VITALS
BODY MASS INDEX: 20.73 KG/M2 | TEMPERATURE: 97.3 F | WEIGHT: 140 LBS | RESPIRATION RATE: 18 BRPM | DIASTOLIC BLOOD PRESSURE: 66 MMHG | HEIGHT: 69 IN | SYSTOLIC BLOOD PRESSURE: 139 MMHG | OXYGEN SATURATION: 96 % | HEART RATE: 59 BPM

## 2025-08-27 DIAGNOSIS — C90.00 MULTIPLE MYELOMA NOT HAVING ACHIEVED REMISSION: Primary | ICD-10-CM

## 2025-08-27 DIAGNOSIS — N18.31 STAGE 3A CHRONIC KIDNEY DISEASE: ICD-10-CM

## 2025-08-27 PROCEDURE — G0463 HOSPITAL OUTPT CLINIC VISIT: HCPCS | Performed by: INTERNAL MEDICINE

## 2025-08-27 RX ORDER — DEXAMETHASONE 4 MG/1
20 TABLET ORAL
Qty: 40 TABLET | Refills: 3 | Status: SHIPPED | OUTPATIENT
Start: 2025-08-27

## 2025-08-27 RX ORDER — ONDANSETRON 8 MG/1
8 TABLET, FILM COATED ORAL 3 TIMES DAILY PRN
Qty: 30 TABLET | Refills: 5 | Status: SHIPPED | OUTPATIENT
Start: 2025-08-27

## 2025-08-28 ENCOUNTER — SPECIALTY PHARMACY (OUTPATIENT)
Dept: PHARMACY | Facility: HOSPITAL | Age: OVER 89
End: 2025-08-28
Payer: MEDICARE

## 2025-08-28 DIAGNOSIS — C90.00 MULTIPLE MYELOMA NOT HAVING ACHIEVED REMISSION: Primary | ICD-10-CM

## 2025-08-28 RX ORDER — LENALIDOMIDE 10 MG/1
10 CAPSULE ORAL DAILY
Qty: 21 CAPSULE | Refills: 0 | Status: SHIPPED | OUTPATIENT
Start: 2025-09-02 | End: 2025-09-23

## (undated) DEVICE — Device: Brand: SINGLE USE INJECTOR NM600/610

## (undated) DEVICE — SINGLE-USE BIOPSY FORCEPS: Brand: RADIAL JAW 4

## (undated) DEVICE — Device: Brand: DISPOSABLE ELECTROSURGICAL SNARE

## (undated) DEVICE — THE SINGLE USE ETRAP – POLYP TRAP IS USED FOR SUCTION RETRIEVAL OF ENDOSCOPICALLY REMOVED POLYPS.: Brand: ETRAP

## (undated) DEVICE — SNAR E/S POLYP SNAREMASTER OVL/10MM 2.8X2300MM YEL

## (undated) DEVICE — EGD OR ERCP KIT: Brand: MEDLINE INDUSTRIES, INC.

## (undated) DEVICE — COLON KIT: Brand: MEDLINE INDUSTRIES, INC.

## (undated) DEVICE — SOL IRRG H2O PL/BG 1000ML STRL

## (undated) DEVICE — Device: Brand: DEFENDO AIR/WATER/SUCTION AND BIOPSY VALVE

## (undated) DEVICE — KT SYR GEL ORISE SNGL PK 10ML